# Patient Record
Sex: MALE | Race: WHITE | NOT HISPANIC OR LATINO | Employment: FULL TIME | URBAN - METROPOLITAN AREA
[De-identification: names, ages, dates, MRNs, and addresses within clinical notes are randomized per-mention and may not be internally consistent; named-entity substitution may affect disease eponyms.]

---
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
## 2021-01-07 ENCOUNTER — HOSPITAL ENCOUNTER (OUTPATIENT)
Dept: PHYSICAL THERAPY | Facility: HOSPITAL | Age: 21
Setting detail: THERAPIES SERIES
End: 2021-01-07
Attending: FAMILY MEDICINE
Payer: COMMERCIAL

## 2021-01-07 PROCEDURE — 97140 MANUAL THERAPY 1/> REGIONS: CPT | Mod: GP

## 2021-01-12 ENCOUNTER — HOSPITAL ENCOUNTER (OUTPATIENT)
Dept: PHYSICAL THERAPY | Facility: HOSPITAL | Age: 21
Setting detail: THERAPIES SERIES
End: 2021-01-12
Attending: FAMILY MEDICINE
Payer: COMMERCIAL

## 2021-01-12 PROCEDURE — 97140 MANUAL THERAPY 1/> REGIONS: CPT | Mod: GP

## 2021-02-24 NOTE — PROGRESS NOTES
Outpatient Physical Therapy Discharge Note     Patient: Gallito Doshi  : 2000    Beginning/End Dates of Reporting Period:  2020 to 2021    Referring Provider: Nelly Zapata MD    Therapy Diagnosis: Chronic LBP mediated by somatic dysfunction at pelvic, sacral, lumbar, and thoracic segments, with functional leg length difference.     Client Self Report: Patient seen [id number] for C/O LBP. Doing better. No back pain unless he leans one way or the other. When he straightens up then the pain goes away. He is taking the Ibuprofen only once in awhile PRN. Not every day.     Objective Measurements:  Objective Measure: Somatic dysfunction  Details: Dural sheath adhered in lumbar, thoracic, and cervical regions, in caudal and cephalad glides, although improved. Leg lengths equal, pelvis level, and SI jts equally mobile. Sacral, lumbar, and thoracic segments WFL of alignment.        Goals:  Goal Identifier 1 Functional   Goal Description Improved Oswestry Disability Index score to 5% or less.   Target Date 02/10/21   Date Met      Progress:     Goal Identifier 2 Outcome   Goal Description Resolution of somatic dysfunction   Target Date 02/10/21   Date Met  21   Progress:       Progress Toward Goals:   Progress limited: patient failed to arrive or cancelled follow-up appointments    Plan:  Discharge from therapy.    Discharge:    Reason for Discharge: Patient has not made expected progress due to interrupted treatment attendance.  Patient has failed to schedule further appointments.    Equipment Issued: n/a    Discharge Plan: current status of patient is unknown    Mariam García DPT

## 2021-04-30 ENCOUNTER — RESULTS ONLY (OUTPATIENT)
Dept: LAB | Age: 21
End: 2021-04-30

## 2021-04-30 LAB
LABORATORY COMMENT REPORT: NORMAL
SARS-COV-2 RNA RESP QL NAA+PROBE: NEGATIVE
SPECIMEN SOURCE: NORMAL

## 2021-06-09 ENCOUNTER — APPOINTMENT (OUTPATIENT)
Dept: OCCUPATIONAL MEDICINE | Facility: OTHER | Age: 21
End: 2021-06-09

## 2021-06-28 ENCOUNTER — APPOINTMENT (OUTPATIENT)
Dept: OCCUPATIONAL MEDICINE | Facility: OTHER | Age: 21
End: 2021-06-28

## 2021-07-28 ENCOUNTER — TRANSFERRED RECORDS (OUTPATIENT)
Dept: HEALTH INFORMATION MANAGEMENT | Facility: CLINIC | Age: 21
End: 2021-07-28

## 2021-08-26 NOTE — PROGRESS NOTES
Assessment & Plan     FHx: hemochromatosis  - Cancer Risk Mgmt/Cancer Genetic Counseling Referral; Future    Chronic bilateral low back pain without sciatica  Stable  - c/w IBU / tizanidine   6}     See Patient Instructions    Return if symptoms worsen or fail to improve.    Nelly Zapata MD  [facility] - ROBINA Conti is a 21 year old who presents for the following health issues     HPI     Concern - Hemachromatosis  Onset: Birth  Description: Excess iron in the body  Intensity: severe  Progression of Symptoms:   Accompanying Signs & Symptoms: Maternal Great Uncle has hemachromatosis, maternal grandma was a carrier, and mother carries both genes. 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   HENT: Negative for congestion.    Respiratory: Negative for shortness of breath and wheezing.    Cardiovascular: Negative for chest pain and palpitations.   Gastrointestinal: Negative for abdominal pain.   Musculoskeletal: Positive for back pain.   Psychiatric/Behavioral: Positive for mood changes (improved).          Objective    /80 (BP Location: Left arm, Patient Position: Chair, Cuff Size: Adult Large)   Pulse 76   Temp 98.2  F (36.8  C) (Tympanic)   Resp 18   Wt 112.7 kg (248 lb 6.4 oz)   SpO2 96%   BMI 33.69 kg/m    Body mass index is 33.69 kg/m .  Physical Exam  Constitutional:       General: He is not in acute distress.     Appearance: He is not ill-appearing.   Cardiovascular:      Rate and Rhythm: Normal rate and regular  rhythm.      Pulses: Normal pulses.      Heart sounds: No murmur heard.     Pulmonary:      Effort: Pulmonary effort is normal. No respiratory distress.      Breath sounds: No wheezing or rales.   Neurological:      Mental Status: He is alert.   Psychiatric:         Mood and Affect: Mood normal.         Reviewed glucoses <100

## 2021-08-31 ENCOUNTER — OFFICE VISIT (OUTPATIENT)
Dept: FAMILY MEDICINE | Facility: OTHER | Age: 21
End: 2021-08-31
Attending: FAMILY MEDICINE
Payer: COMMERCIAL

## 2021-08-31 VITALS
WEIGHT: 248.4 LBS | OXYGEN SATURATION: 96 % | RESPIRATION RATE: 18 BRPM | DIASTOLIC BLOOD PRESSURE: 80 MMHG | BODY MASS INDEX: 33.69 KG/M2 | HEART RATE: 76 BPM | TEMPERATURE: 98.2 F | SYSTOLIC BLOOD PRESSURE: 117 MMHG

## 2021-08-31 DIAGNOSIS — Z83.49 FHX: HEMOCHROMATOSIS: Primary | ICD-10-CM

## 2021-08-31 DIAGNOSIS — M54.50 CHRONIC BILATERAL LOW BACK PAIN WITHOUT SCIATICA: ICD-10-CM

## 2021-08-31 DIAGNOSIS — G89.29 CHRONIC BILATERAL LOW BACK PAIN WITHOUT SCIATICA: ICD-10-CM

## 2021-08-31 PROCEDURE — 99213 OFFICE O/P EST LOW 20 MIN: CPT | Performed by: FAMILY MEDICINE

## 2021-08-31 ASSESSMENT — ENCOUNTER SYMPTOMS
PALPITATIONS: 0
BACK PAIN: 1
WHEEZING: 0
SHORTNESS OF BREATH: 0
FEVER: 0
ABDOMINAL PAIN: 0
CHILLS: 0

## 2021-08-31 ASSESSMENT — PAIN SCALES - GENERAL: PAINLEVEL: NO PAIN (0)

## 2021-08-31 NOTE — NURSING NOTE
Chief Complaint   Patient presents with     Immune Deficiency       Initial /80 (BP Location: Left arm, Patient Position: Chair, Cuff Size: Adult Large)   Pulse 76   Temp 98.2  F (36.8  C) (Tympanic)   Resp 18   Wt 112.7 kg (248 lb 6.4 oz)   SpO2 96%   BMI 33.69 kg/m   Estimated body mass index is 33.69 kg/m  as calculated from the following:    Height as of 11/14/19: 1.829 m (6').    Weight as of this encounter: 112.7 kg (248 lb 6.4 oz).  Medication Reconciliation: complete  Isabel Rodriguez LPN

## 2021-09-16 ENCOUNTER — TRANSCRIBE ORDERS (OUTPATIENT)
Dept: OTHER | Age: 21
End: 2021-09-16

## 2021-12-14 ENCOUNTER — NURSE TRIAGE (OUTPATIENT)
Dept: FAMILY MEDICINE | Facility: OTHER | Age: 21
End: 2021-12-14

## 2021-12-14 DIAGNOSIS — J02.0 STREPTOCOCCAL SORE THROAT: Primary | ICD-10-CM

## 2021-12-14 DIAGNOSIS — Z20.818 STREP THROAT EXPOSURE: ICD-10-CM

## 2021-12-14 DIAGNOSIS — Z20.822 SUSPECTED 2019 NOVEL CORONAVIRUS INFECTION: ICD-10-CM

## 2021-12-14 NOTE — TELEPHONE ENCOUNTER
"Pt called stated he called employee health today and they scheduled him for a COVID test. Pt is requesting to be swabbed for covid/influenza/rsv and strep test. Pt reports exposed to all yesterday.    PCP to advise on plan. Orders pended    Call back number: [phone]    COVID 19 Nurse Triage Plan/Patient Instructions    Please be aware that novel coronavirus (COVID-19) may be circulating in the community. If you develop symptoms such as fever, cough, or SOB or if you have concerns about the presence of another infection including coronavirus (COVID-19), please contact your health care provider or visit [email or web address].     Disposition/Instructions    Home care recommended. Follow home care protocol based instructions.  Additional COVID19 information to add for patients.   How can I protect others?  If you have symptoms (fever, cough, body aches or trouble breathing): Stay home and away from others (self-isolate) until:    At least 10 days have passed since your symptoms started, And     You ve had no fever--and no medicine that reduces fever--for 1 full day (24 hours), And      Your other symptoms have resolved (gotten better).     If you don t have symptoms, but a test showed that you have COVID-19 (you tested positive):    Stay home and away from others (self-isolate). Follow the tips under \"How do I self-isolate?\" below for 10 days (20 days if you have a weak immune system).    You don't need to be retested for COVID-19 before going back to school or work. As long as you're fever-free and feeling better, you can go back to school, work and other activities after waiting the 10 or 20 days.     How do I self-isolate?    Stay in your own room, even for meals. Use your own bathroom if you can.     Stay away from others in your home. No hugging, kissing or shaking hands. No visitors.    Don t go to work, school or anywhere else.     Clean  high touch  surfaces often (doorknobs, counters, handles, " etc.). Use a household cleaning spray or wipes. You ll find a full list on the EPA website:  www.epa.gov/pesticide-registration/list-n-disinfectants-use-against-sars-cov-2.    Cover your mouth and nose with a mask, tissue or washcloth to avoid spreading germs.    Wash your hands and face often. Use soap and water.    Caregivers in these groups are at risk for severe illness due to COVID-19:  o People 65 years and older  o People who live in a nursing home or long-term care facility  o People with chronic disease (lung, heart, cancer, diabetes, kidney, liver, immunologic)  o People who have a weakened immune system, including those who:  - Are in cancer treatment  - Take medicine that weakens the immune system, such as corticosteroids  - Had a bone marrow or organ transplant  - Have an immune deficiency  - Have poorly controlled HIV or AIDS  - Are obese (body mass index of 40 or higher)  - Smoke regularly    Caregivers should wear gloves while washing dishes, handling laundry and cleaning bedrooms and bathrooms.    Use caution when washing and drying laundry: Don t shake dirty laundry, and use the warmest water setting that you can.    For more tips, go to www.cdc.gov/coronavirus/2019-ncov/downloads/10Things.pdf.    How can I take care of myself?  1. Get lots of rest. Drink extra fluids (unless a doctor has told you not to).     2. Take Tylenol (acetaminophen) for fever or pain. If you have liver or kidney problems, ask your family doctor if it s okay to take Tylenol.     Adults can take either:     650 mg (two 325 mg pills) every 4 to 6 hours, or     1,000 mg (two 500 mg pills) every 8 hours as needed.     Note: Don t take more than 3,000 mg in one day.   Acetaminophen is found in many medicines (both prescribed and over-the-counter medicines). Read all labels to be sure you don t take too much.     For children, check the Tylenol bottle for the right dose. The dose is based on the child s age or weight.    3. If  you have other health problems (like cancer, heart failure, an organ transplant or severe kidney disease): Call your specialty clinic if you don t feel better in the next 2 days.    4. Know when to call 911: Emergency warning signs include:    Trouble breathing or shortness of breath    Pain or pressure in the chest that doesn t go away    Feeling confused like you haven t felt before, or not being able to wake up    Bluish-colored lips or face    What are the symptoms of COVID-19?     The most common symptoms are cough, fever and trouble breathing.     Less common symptoms include body aches, chills, diarrhea (loose, watery poops), fatigue (feeling very tired), headache, runny nose, sore throat and loss of smell.    COVID-19 can cause severe coughing (bronchitis) and lung infection (pneumonia).    How does it spread?     The virus may spread when a person coughs or sneezes into the air. The virus can travel about 6 feet this way, and it can live on surfaces.      Common  (household disinfectants) will kill the virus.    Who is at risk?  Anyone can catch COVID-19 if they re around someone who has the virus.    How can others protect themselves?     Stay away from people who have COVID-19 (or symptoms of COVID-19).    Wash hands often with soap and water. Or, use hand  with at least 60% alcohol.    Avoid touching the eyes, nose or mouth.     Wear a face mask when you go out in public, when sick or when caring for a sick person.    Where can I get more information?     [facility]: About COVID-19: [email or web address]    CDC: What to Do If You re Sick: www.cdc.gov/coronavirus/2019-ncov/about/steps-when-sick.html    CDC: Ending Home Isolation: www.cdc.gov/coronavirus/2019-ncov/hcp/disposition-in-home-patients.html     CDC: Caring for Someone: www.cdc.gov/coronavirus/2019-ncov/if-you-are-sick/care-for-someone.html     [facility]: Interim Guidance for Hospital Discharge to Home:  [email or web address]/hcp/hospdischarge.pdf    [facility] clinical trials (COVID-19 research studies): [email or web address]-clinical-[email or web address]     Below are the COVID-19 hotlines at the [place] Department of Health ([facility]). Interpreters are available.   o For health questions: Call 076-050-8946 or 1-889.514.9411 (7 a.m. to 7 p.m.)  o For questions about schools and childcare: Call 808-083-4380 or 1-620.744.1439 (7 a.m. to 7 p.m.)          Thank you for taking steps to prevent the spread of this virus.  o Limit your contact with others.  o Wear a simple mask to cover your cough.  o Wash your hands well and often.    Resources     [employer]: About COVID-19: [email or web address]    CDC: What to Do If You're Sick: www.cdc.gov/coronavirus/2019-ncov/about/steps-when-sick.html    CDC: Ending Home Isolation: www.cdc.gov/coronavirus/2019-ncov/hcp/disposition-in-home-patients.html     CDC: Caring for Someone: www.cdc.gov/coronavirus/2019-ncov/if-you-are-sick/care-for-someone.html     [facility]: Interim Guidance for Hospital Discharge to Home: [email or web address].[email or web address]/[email or web address]/hcp/hospdischarge.pdf    [facility] clinical trials (COVID-19 research studies): [email or web address]-clinical-trials     Below are the COVID-19 hotlines at the [place] Department [facility] ([facility]). Interpreters are available.   o For health questions: Call 566-498-0557 or 1-897.145.4361 (7 a.m. to 7 p.m.)  o For questions about schools and childcare: Call 965-140-5753 or 1-132.356.2961 (7 a.m. to 7 p.m.)                       Reason for Disposition    COVID-19 Home Isolation, questions about    COVID-19 Testing, questions about    Additional Information    Negative: SEVERE difficulty breathing (e.g., struggling for each breath, speaks in single words)    Negative: Difficult to awaken or acting confused (e.g., disoriented, slurred speech)    Negative: Bluish (or gray) lips  or face now    Negative: Shock suspected (e.g., cold/pale/clammy skin, too weak to stand, low BP, rapid pulse)    Negative: Sounds like a life-threatening emergency to the triager    Negative: [1] COVID-19 exposure AND [2] no symptoms    Negative: COVID-19 vaccine reaction suspected (e.g., fever, headache, muscle aches) occurring 1 to 3 days after getting vaccine    Negative: COVID-19 vaccine, questions about    Negative: [1] Lives with someone known to have influenza (flu test positive) AND [2] flu-like symptoms (e.g., cough, runny nose, sore throat, SOB; with or without fever)    Negative: [1] Adult with possible COVID-19 symptoms AND [2] triager concerned about severity of symptoms or other causes    Negative: COVID-19 and breastfeeding, questions about    Negative: SEVERE or constant chest pain or pressure (Exception: mild central chest pain, present only when coughing)    Negative: MODERATE difficulty breathing (e.g., speaks in phrases, SOB even at rest, pulse 100-120)    Negative: [1] Headache AND [2] stiff neck (can't touch chin to chest)    Negative: MILD difficulty breathing (e.g., minimal/no SOB at rest, SOB with walking, pulse <100)    Negative: Chest pain or pressure    Negative: Patient sounds very sick or weak to the triager    Negative: Fever > 103 F (39.4 C)    Negative: [1] Fever > 101 F (38.3 C) AND [2] age > 60 years    Negative: [1] Fever > 100.0 F (37.8 C) AND [2] bedridden (e.g., nursing home patient, CVA, chronic illness, recovering from surgery)    Negative: HIGH RISK for severe COVID complications (e.g., age > 64 years, obesity with BMI > 25, pregnant, chronic lung disease or other chronic medical condition)  (Exception: Already seen by PCP and no new or worsening symptoms.)    Negative: [1] HIGH RISK patient AND [2] influenza is widespread in the community AND [3] ONE OR MORE respiratory symptoms: cough, sore throat, runny or stuffy nose    Negative: [1] HIGH RISK patient AND [2] influenza  "exposure within the last 7 days AND [3] ONE OR MORE respiratory symptoms: cough, sore throat, runny or stuffy nose    Negative: [1] COVID-19 infection suspected by caller or triager AND [2] mild symptoms (cough, fever, or others) AND [3] negative COVID-19 rapid test    Negative: Fever present > 3 days (72 hours)    Negative: [1] Fever returns after gone for over 24 hours AND [2] symptoms worse or not improved    Negative: [1] Continuous (nonstop) coughing interferes with work or school AND [2] no improvement using cough treatment per protocol    Negative: Cough present > 3 weeks    Answer Assessment - Initial Assessment Questions  1. COVID-19 DIAGNOSIS: \"Who made your Coronavirus (COVID-19) diagnosis?\" \"Was it confirmed by a positive lab test?\" If not diagnosed by a HCP, ask \"Are there lots of cases (community spread) where you live?\" (See public health department website, if unsure)      no  2. COVID-19 EXPOSURE: \"Was there any known exposure to COVID before the symptoms began?\" CDC Definition of close contact: within 6 feet (2 meters) for a total of 15 minutes or more over a 24-hour period.       Yes reports at work but masked and gown and gloved  3. ONSET: \"When did the COVID-19 symptoms start?\"       12/14/21  4. WORST SYMPTOM: \"What is your worst symptom?\" (e.g., cough, fever, shortness of breath, muscle aches)      Dry Moderate cough taking dayquil   5. COUGH: \"Do you have a cough?\" If Yes, ask: \"How bad is the cough?\"        See note  6. FEVER: \"Do you have a fever?\" If Yes, ask: \"What is your temperature, how was it measured, and when did it start?\"      T 100.2  7. RESPIRATORY STATUS: \"Describe your breathing?\" (e.g., shortness of breath, wheezing, unable to speak)       denies  8. BETTER-SAME-WORSE: \"Are you getting better, staying the same or getting worse compared to yesterday?\"  If getting worse, ask, \"In what way?\"      same  9. HIGH RISK DISEASE: \"Do you have any chronic medical problems?\" (e.g., " "asthma, heart or lung disease, weak immune system, obesity, etc.)      no  10. PREGNANCY: \"Is there any chance you are pregnant?\" \"When was your last menstrual period?\"        n/a  11. OTHER SYMPTOMS: \"Do you have any other symptoms?\"  (e.g., chills, fatigue, headache, loss of smell or taste, muscle pain, sore throat; new loss of smell or taste especially support the diagnosis of COVID-19)        Sore throat, moderate cough.    Protocols used: CORONAVIRUS (COVID-19) DIAGNOSED OR [id number] 8.25.2021      "

## 2021-12-14 NOTE — TELEPHONE ENCOUNTER
Nelly Zapata MD  You 35 minutes ago (10:54 AM)         Orders signed    Message text      Pt updated. Pt stated employee health requested he be tested tomorrow.   Appointments in Next Year    Dec 15, 2021  9:00 AM  LAB with Viky Mills RN   [facility] ([facility] ) [phone]   Dec 15, 2021  9:00 AM  (Arrive by 8:45 AM)  SHORT with HC COLLECTION  [facility] ([facility] ) [phone]

## 2021-12-15 ENCOUNTER — LAB (OUTPATIENT)
Dept: OCCUPATIONAL MEDICINE | Facility: OTHER | Age: 21
End: 2021-12-15

## 2021-12-15 ENCOUNTER — OFFICE VISIT (OUTPATIENT)
Dept: FAMILY MEDICINE | Facility: OTHER | Age: 21
End: 2021-12-15
Attending: FAMILY MEDICINE
Payer: COMMERCIAL

## 2021-12-15 DIAGNOSIS — Z20.822 SUSPECTED 2019 NOVEL CORONAVIRUS INFECTION: ICD-10-CM

## 2021-12-15 DIAGNOSIS — J02.0 STREPTOCOCCAL SORE THROAT: Primary | ICD-10-CM

## 2021-12-15 LAB
FLUAV RNA SPEC QL NAA+PROBE: NEGATIVE
FLUBV RNA RESP QL NAA+PROBE: NEGATIVE
GROUP A STREP BY PCR: NOT DETECTED
RSV RNA SPEC NAA+PROBE: NEGATIVE
SARS-COV-2 RNA RESP QL NAA+PROBE: NEGATIVE

## 2021-12-15 PROCEDURE — 87651 STREP A DNA AMP PROBE: CPT

## 2021-12-15 PROCEDURE — 87637 SARSCOV2&INF A&B&RSV AMP PRB: CPT

## 2022-02-04 ENCOUNTER — LAB (OUTPATIENT)
Dept: OCCUPATIONAL MEDICINE | Facility: OTHER | Age: 22
End: 2022-02-04

## 2022-02-04 PROCEDURE — 99000 SPECIMEN HANDLING OFFICE-LAB: CPT

## 2022-07-24 ENCOUNTER — ANESTHESIA (OUTPATIENT)
Dept: SURGERY | Facility: HOSPITAL | Age: 22
End: 2022-07-24
Payer: COMMERCIAL

## 2022-07-24 ENCOUNTER — ANESTHESIA EVENT (OUTPATIENT)
Dept: SURGERY | Facility: HOSPITAL | Age: 22
End: 2022-07-24
Payer: COMMERCIAL

## 2022-07-24 ENCOUNTER — HOSPITAL ENCOUNTER (EMERGENCY)
Facility: HOSPITAL | Age: 22
Discharge: HOME | End: 2022-07-24
Attending: EMERGENCY MEDICINE | Admitting: EMERGENCY MEDICINE
Payer: COMMERCIAL

## 2022-07-24 ENCOUNTER — APPOINTMENT (OUTPATIENT)
Dept: CT IMAGING | Facility: HOSPITAL | Age: 22
End: 2022-07-24
Attending: EMERGENCY MEDICINE
Payer: COMMERCIAL

## 2022-07-24 VITALS
HEART RATE: 85 BPM | TEMPERATURE: 97.2 F | HEIGHT: 73 IN | OXYGEN SATURATION: 96 % | WEIGHT: 260 LBS | RESPIRATION RATE: 16 BRPM | SYSTOLIC BLOOD PRESSURE: 130 MMHG | BODY MASS INDEX: 34.46 KG/M2 | DIASTOLIC BLOOD PRESSURE: 76 MMHG

## 2022-07-24 DIAGNOSIS — K35.30 ACUTE APPENDICITIS WITH LOCALIZED PERITONITIS, WITHOUT PERFORATION, ABSCESS, OR GANGRENE: Primary | ICD-10-CM

## 2022-07-24 LAB
ALBUMIN SERPL-MCNC: 4.3 G/DL (ref 3.4–5)
ALBUMIN UR-MCNC: NEGATIVE MG/DL
ALP SERPL-CCNC: 158 U/L (ref 40–150)
ALT SERPL W P-5'-P-CCNC: 178 U/L (ref 0–70)
ANION GAP SERPL CALCULATED.3IONS-SCNC: 6 MMOL/L (ref 3–14)
APPEARANCE UR: CLEAR
AST SERPL W P-5'-P-CCNC: 57 U/L (ref 0–45)
BASOPHILS # BLD AUTO: 0 10E3/UL (ref 0–0.2)
BASOPHILS NFR BLD AUTO: 0 %
BILIRUB SERPL-MCNC: 0.4 MG/DL (ref 0.2–1.3)
BILIRUB UR QL STRIP: NEGATIVE
BUN SERPL-MCNC: 15 MG/DL (ref 7–30)
CALCIUM SERPL-MCNC: 9.3 MG/DL (ref 8.5–10.1)
CHLORIDE BLD-SCNC: 107 MMOL/L (ref 94–109)
CO2 SERPL-SCNC: 25 MMOL/L (ref 20–32)
COLOR UR AUTO: ABNORMAL
CREAT SERPL-MCNC: 1.05 MG/DL (ref 0.66–1.25)
EOSINOPHIL # BLD AUTO: 0.3 10E3/UL (ref 0–0.7)
EOSINOPHIL NFR BLD AUTO: 2 %
ERYTHROCYTE [DISTWIDTH] IN BLOOD BY AUTOMATED COUNT: 11.8 % (ref 10–15)
ERYTHROCYTE [SEDIMENTATION RATE] IN BLOOD BY WESTERGREN METHOD: 5 MM/HR (ref 0–15)
FLUAV RNA SPEC QL NAA+PROBE: NEGATIVE
FLUBV RNA RESP QL NAA+PROBE: NEGATIVE
GFR SERPL CREATININE-BSD FRML MDRD: >90 ML/MIN/1.73M2
GLUCOSE BLD-MCNC: 100 MG/DL (ref 70–99)
GLUCOSE UR STRIP-MCNC: NEGATIVE MG/DL
HCT VFR BLD AUTO: 41 % (ref 40–53)
HGB BLD-MCNC: 14.3 G/DL (ref 13.3–17.7)
HGB UR QL STRIP: NEGATIVE
HOLD SPECIMEN: NORMAL
HOLD SPECIMEN: NORMAL
IMM GRANULOCYTES # BLD: 0 10E3/UL
IMM GRANULOCYTES NFR BLD: 0 %
INR PPP: 1.08 (ref 0.85–1.15)
KETONES UR STRIP-MCNC: NEGATIVE MG/DL
LEUKOCYTE ESTERASE UR QL STRIP: NEGATIVE
LIPASE SERPL-CCNC: 104 U/L (ref 73–393)
LYMPHOCYTES # BLD AUTO: 1.9 10E3/UL (ref 0.8–5.3)
LYMPHOCYTES NFR BLD AUTO: 14 %
MCH RBC QN AUTO: 31.9 PG (ref 26.5–33)
MCHC RBC AUTO-ENTMCNC: 34.9 G/DL (ref 31.5–36.5)
MCV RBC AUTO: 92 FL (ref 78–100)
MONOCYTES # BLD AUTO: 1 10E3/UL (ref 0–1.3)
MONOCYTES NFR BLD AUTO: 8 %
MUCOUS THREADS #/AREA URNS LPF: PRESENT /LPF
NEUTROPHILS # BLD AUTO: 10.3 10E3/UL (ref 1.6–8.3)
NEUTROPHILS NFR BLD AUTO: 76 %
NITRATE UR QL: NEGATIVE
NRBC # BLD AUTO: 0 10E3/UL
NRBC BLD AUTO-RTO: 0 /100
PH UR STRIP: 5 [PH] (ref 4.7–8)
PLATELET # BLD AUTO: 169 10E3/UL (ref 150–450)
POTASSIUM BLD-SCNC: 4.3 MMOL/L (ref 3.4–5.3)
PROT SERPL-MCNC: 7.6 G/DL (ref 6.8–8.8)
RBC # BLD AUTO: 4.48 10E6/UL (ref 4.4–5.9)
RBC URINE: <1 /HPF
RSV RNA SPEC NAA+PROBE: NEGATIVE
SARS-COV-2 RNA RESP QL NAA+PROBE: NEGATIVE
SODIUM SERPL-SCNC: 138 MMOL/L (ref 133–144)
SP GR UR STRIP: 1.02 (ref 1–1.03)
SQUAMOUS EPITHELIAL: 0 /HPF
UROBILINOGEN UR STRIP-MCNC: NORMAL MG/DL
WBC # BLD AUTO: 13.5 10E3/UL (ref 4–11)
WBC URINE: <1 /HPF

## 2022-07-24 PROCEDURE — 250N000011 HC RX IP 250 OP 636: Performed by: NURSE ANESTHETIST, CERTIFIED REGISTERED

## 2022-07-24 PROCEDURE — 87637 SARSCOV2&INF A&B&RSV AMP PRB: CPT | Performed by: EMERGENCY MEDICINE

## 2022-07-24 PROCEDURE — 250N000025 HC SEVOFLURANE, PER MIN: Performed by: SURGERY

## 2022-07-24 PROCEDURE — 258N000003 HC RX IP 258 OP 636: Performed by: NURSE ANESTHETIST, CERTIFIED REGISTERED

## 2022-07-24 PROCEDURE — 81001 URINALYSIS AUTO W/SCOPE: CPT | Performed by: EMERGENCY MEDICINE

## 2022-07-24 PROCEDURE — 250N000009 HC RX 250: Performed by: NURSE ANESTHETIST, CERTIFIED REGISTERED

## 2022-07-24 PROCEDURE — 85025 COMPLETE CBC W/AUTO DIFF WBC: CPT | Performed by: EMERGENCY MEDICINE

## 2022-07-24 PROCEDURE — 250N000011 HC RX IP 250 OP 636: Performed by: EMERGENCY MEDICINE

## 2022-07-24 PROCEDURE — 74177 CT ABD & PELVIS W/CONTRAST: CPT

## 2022-07-24 PROCEDURE — 44970 LAPAROSCOPY APPENDECTOMY: CPT | Performed by: NURSE ANESTHETIST, CERTIFIED REGISTERED

## 2022-07-24 PROCEDURE — 80053 COMPREHEN METABOLIC PANEL: CPT | Performed by: EMERGENCY MEDICINE

## 2022-07-24 PROCEDURE — 272N000001 HC OR GENERAL SUPPLY STERILE: Performed by: SURGERY

## 2022-07-24 PROCEDURE — 250N000013 HC RX MED GY IP 250 OP 250 PS 637: Performed by: NURSE ANESTHETIST, CERTIFIED REGISTERED

## 2022-07-24 PROCEDURE — 250N000011 HC RX IP 250 OP 636: Performed by: STUDENT IN AN ORGANIZED HEALTH CARE EDUCATION/TRAINING PROGRAM

## 2022-07-24 PROCEDURE — 258N000003 HC RX IP 258 OP 636: Performed by: SURGERY

## 2022-07-24 PROCEDURE — 96374 THER/PROPH/DIAG INJ IV PUSH: CPT | Mod: XU

## 2022-07-24 PROCEDURE — 258N000003 HC RX IP 258 OP 636: Performed by: EMERGENCY MEDICINE

## 2022-07-24 PROCEDURE — 85652 RBC SED RATE AUTOMATED: CPT | Performed by: EMERGENCY MEDICINE

## 2022-07-24 PROCEDURE — 96376 TX/PRO/DX INJ SAME DRUG ADON: CPT

## 2022-07-24 PROCEDURE — 250N000009 HC RX 250: Performed by: SURGERY

## 2022-07-24 PROCEDURE — 44970 LAPAROSCOPY APPENDECTOMY: CPT | Performed by: SURGERY

## 2022-07-24 PROCEDURE — 710N000010 HC RECOVERY PHASE 1, LEVEL 2, PER MIN: Performed by: SURGERY

## 2022-07-24 PROCEDURE — 250N000011 HC RX IP 250 OP 636

## 2022-07-24 PROCEDURE — 85610 PROTHROMBIN TIME: CPT | Performed by: EMERGENCY MEDICINE

## 2022-07-24 PROCEDURE — 99284 EMERGENCY DEPT VISIT MOD MDM: CPT | Performed by: STUDENT IN AN ORGANIZED HEALTH CARE EDUCATION/TRAINING PROGRAM

## 2022-07-24 PROCEDURE — 99285 EMERGENCY DEPT VISIT HI MDM: CPT | Mod: 25

## 2022-07-24 PROCEDURE — 88304 TISSUE EXAM BY PATHOLOGIST: CPT | Mod: 26 | Performed by: PATHOLOGY

## 2022-07-24 PROCEDURE — C9803 HOPD COVID-19 SPEC COLLECT: HCPCS

## 2022-07-24 PROCEDURE — 83690 ASSAY OF LIPASE: CPT | Performed by: EMERGENCY MEDICINE

## 2022-07-24 PROCEDURE — 250N000011 HC RX IP 250 OP 636: Performed by: SURGERY

## 2022-07-24 PROCEDURE — 360N000076 HC SURGERY LEVEL 3, PER MIN: Performed by: SURGERY

## 2022-07-24 PROCEDURE — 88304 TISSUE EXAM BY PATHOLOGIST: CPT | Mod: TC | Performed by: SURGERY

## 2022-07-24 PROCEDURE — 36415 COLL VENOUS BLD VENIPUNCTURE: CPT | Performed by: EMERGENCY MEDICINE

## 2022-07-24 PROCEDURE — 710N000012 HC RECOVERY PHASE 2, PER MINUTE: Performed by: SURGERY

## 2022-07-24 PROCEDURE — 96375 TX/PRO/DX INJ NEW DRUG ADDON: CPT

## 2022-07-24 PROCEDURE — 370N000017 HC ANESTHESIA TECHNICAL FEE, PER MIN: Performed by: SURGERY

## 2022-07-24 RX ORDER — HYDROMORPHONE HYDROCHLORIDE 1 MG/ML
0.2 INJECTION, SOLUTION INTRAMUSCULAR; INTRAVENOUS; SUBCUTANEOUS EVERY 5 MIN PRN
Status: DISCONTINUED | OUTPATIENT
Start: 2022-07-24 | End: 2022-07-24 | Stop reason: HOSPADM

## 2022-07-24 RX ORDER — KETOROLAC TROMETHAMINE 30 MG/ML
INJECTION, SOLUTION INTRAMUSCULAR; INTRAVENOUS PRN
Status: DISCONTINUED | OUTPATIENT
Start: 2022-07-24 | End: 2022-07-24

## 2022-07-24 RX ORDER — SODIUM CHLORIDE, SODIUM LACTATE, POTASSIUM CHLORIDE, CALCIUM CHLORIDE 600; 310; 30; 20 MG/100ML; MG/100ML; MG/100ML; MG/100ML
INJECTION, SOLUTION INTRAVENOUS CONTINUOUS
Status: DISCONTINUED | OUTPATIENT
Start: 2022-07-24 | End: 2022-07-24 | Stop reason: HOSPADM

## 2022-07-24 RX ORDER — ONDANSETRON 2 MG/ML
INJECTION INTRAMUSCULAR; INTRAVENOUS PRN
Status: DISCONTINUED | OUTPATIENT
Start: 2022-07-24 | End: 2022-07-24

## 2022-07-24 RX ORDER — FENTANYL CITRATE 50 UG/ML
50 INJECTION, SOLUTION INTRAMUSCULAR; INTRAVENOUS ONCE
Status: COMPLETED | OUTPATIENT
Start: 2022-07-24 | End: 2022-07-24

## 2022-07-24 RX ORDER — FENTANYL CITRATE 50 UG/ML
INJECTION, SOLUTION INTRAMUSCULAR; INTRAVENOUS PRN
Status: DISCONTINUED | OUTPATIENT
Start: 2022-07-24 | End: 2022-07-24

## 2022-07-24 RX ORDER — CEFAZOLIN SODIUM 2 G/100ML
2 INJECTION, SOLUTION INTRAVENOUS
Status: CANCELLED | OUTPATIENT
Start: 2022-07-24

## 2022-07-24 RX ORDER — ONDANSETRON 2 MG/ML
4 INJECTION INTRAMUSCULAR; INTRAVENOUS EVERY 30 MIN PRN
Status: DISCONTINUED | OUTPATIENT
Start: 2022-07-24 | End: 2022-07-24 | Stop reason: HOSPADM

## 2022-07-24 RX ORDER — IOPAMIDOL 755 MG/ML
128 INJECTION, SOLUTION INTRAVASCULAR ONCE
Status: COMPLETED | OUTPATIENT
Start: 2022-07-24 | End: 2022-07-24

## 2022-07-24 RX ORDER — CEFAZOLIN SODIUM 2 G/100ML
2 INJECTION, SOLUTION INTRAVENOUS SEE ADMIN INSTRUCTIONS
Status: CANCELLED | OUTPATIENT
Start: 2022-07-24

## 2022-07-24 RX ORDER — FENTANYL CITRATE 50 UG/ML
INJECTION, SOLUTION INTRAMUSCULAR; INTRAVENOUS
Status: DISCONTINUED
Start: 2022-07-24 | End: 2022-07-24 | Stop reason: HOSPADM

## 2022-07-24 RX ORDER — METOPROLOL TARTRATE 1 MG/ML
1-2 INJECTION, SOLUTION INTRAVENOUS EVERY 5 MIN PRN
Status: DISCONTINUED | OUTPATIENT
Start: 2022-07-24 | End: 2022-07-24 | Stop reason: HOSPADM

## 2022-07-24 RX ORDER — MEPERIDINE HYDROCHLORIDE 25 MG/ML
12.5 INJECTION INTRAMUSCULAR; INTRAVENOUS; SUBCUTANEOUS
Status: DISCONTINUED | OUTPATIENT
Start: 2022-07-24 | End: 2022-07-24 | Stop reason: HOSPADM

## 2022-07-24 RX ORDER — NALOXONE HYDROCHLORIDE 0.4 MG/ML
0.4 INJECTION, SOLUTION INTRAMUSCULAR; INTRAVENOUS; SUBCUTANEOUS
Status: DISCONTINUED | OUTPATIENT
Start: 2022-07-24 | End: 2022-07-24 | Stop reason: HOSPADM

## 2022-07-24 RX ORDER — SODIUM CHLORIDE 9 MG/ML
INJECTION, SOLUTION INTRAVENOUS CONTINUOUS
Status: DISCONTINUED | OUTPATIENT
Start: 2022-07-24 | End: 2022-07-24 | Stop reason: HOSPADM

## 2022-07-24 RX ORDER — HYDROMORPHONE HYDROCHLORIDE 1 MG/ML
0.5 INJECTION, SOLUTION INTRAMUSCULAR; INTRAVENOUS; SUBCUTANEOUS
Status: DISCONTINUED | OUTPATIENT
Start: 2022-07-24 | End: 2022-07-24 | Stop reason: HOSPADM

## 2022-07-24 RX ORDER — ONDANSETRON 4 MG/1
4 TABLET, ORALLY DISINTEGRATING ORAL EVERY 30 MIN PRN
Status: DISCONTINUED | OUTPATIENT
Start: 2022-07-24 | End: 2022-07-24 | Stop reason: HOSPADM

## 2022-07-24 RX ORDER — FENTANYL CITRATE 50 UG/ML
50 INJECTION, SOLUTION INTRAMUSCULAR; INTRAVENOUS
Status: CANCELLED | OUTPATIENT
Start: 2022-07-24

## 2022-07-24 RX ORDER — SODIUM CHLORIDE, SODIUM LACTATE, POTASSIUM CHLORIDE, CALCIUM CHLORIDE 600; 310; 30; 20 MG/100ML; MG/100ML; MG/100ML; MG/100ML
INJECTION, SOLUTION INTRAVENOUS CONTINUOUS
Status: CANCELLED | OUTPATIENT
Start: 2022-07-24

## 2022-07-24 RX ORDER — BUPIVACAINE HYDROCHLORIDE AND EPINEPHRINE 2.5; 5 MG/ML; UG/ML
INJECTION, SOLUTION EPIDURAL; INFILTRATION; INTRACAUDAL; PERINEURAL PRN
Status: DISCONTINUED | OUTPATIENT
Start: 2022-07-24 | End: 2022-07-24 | Stop reason: HOSPADM

## 2022-07-24 RX ORDER — DEXAMETHASONE SODIUM PHOSPHATE 10 MG/ML
INJECTION, SOLUTION INTRAMUSCULAR; INTRAVENOUS PRN
Status: DISCONTINUED | OUTPATIENT
Start: 2022-07-24 | End: 2022-07-24

## 2022-07-24 RX ORDER — SODIUM CHLORIDE, SODIUM LACTATE, POTASSIUM CHLORIDE, CALCIUM CHLORIDE 600; 310; 30; 20 MG/100ML; MG/100ML; MG/100ML; MG/100ML
INJECTION, SOLUTION INTRAVENOUS CONTINUOUS PRN
Status: DISCONTINUED | OUTPATIENT
Start: 2022-07-24 | End: 2022-07-24

## 2022-07-24 RX ORDER — OXYCODONE HYDROCHLORIDE 5 MG/1
5 TABLET ORAL EVERY 4 HOURS PRN
Status: DISCONTINUED | OUTPATIENT
Start: 2022-07-24 | End: 2022-07-24 | Stop reason: HOSPADM

## 2022-07-24 RX ORDER — HYDRALAZINE HYDROCHLORIDE 20 MG/ML
2.5-5 INJECTION INTRAMUSCULAR; INTRAVENOUS EVERY 10 MIN PRN
Status: DISCONTINUED | OUTPATIENT
Start: 2022-07-24 | End: 2022-07-24 | Stop reason: HOSPADM

## 2022-07-24 RX ORDER — CEFEPIME HYDROCHLORIDE 1 G/50ML
INJECTION, SOLUTION INTRAVENOUS
Status: COMPLETED
Start: 2022-07-24 | End: 2022-07-24

## 2022-07-24 RX ORDER — NALOXONE HYDROCHLORIDE 0.4 MG/ML
0.2 INJECTION, SOLUTION INTRAMUSCULAR; INTRAVENOUS; SUBCUTANEOUS
Status: DISCONTINUED | OUTPATIENT
Start: 2022-07-24 | End: 2022-07-24 | Stop reason: HOSPADM

## 2022-07-24 RX ORDER — OXYCODONE HYDROCHLORIDE 5 MG/1
5 TABLET ORAL EVERY 6 HOURS PRN
Qty: 10 TABLET | Refills: 0 | Status: SHIPPED | OUTPATIENT
Start: 2022-07-24 | End: 2022-08-09

## 2022-07-24 RX ORDER — BUPIVACAINE HYDROCHLORIDE AND EPINEPHRINE 2.5; 5 MG/ML; UG/ML
INJECTION, SOLUTION EPIDURAL; INFILTRATION; INTRACAUDAL; PERINEURAL
Status: DISCONTINUED
Start: 2022-07-24 | End: 2022-07-24 | Stop reason: HOSPADM

## 2022-07-24 RX ORDER — ACETAMINOPHEN 325 MG/1
650 TABLET ORAL EVERY 4 HOURS PRN
Qty: 50 TABLET | Refills: 0 | Status: SHIPPED | OUTPATIENT
Start: 2022-07-24 | End: 2024-03-11

## 2022-07-24 RX ORDER — LIDOCAINE 40 MG/G
CREAM TOPICAL
Status: CANCELLED | OUTPATIENT
Start: 2022-07-24

## 2022-07-24 RX ORDER — LIDOCAINE HYDROCHLORIDE 20 MG/ML
INJECTION, SOLUTION INFILTRATION; PERINEURAL PRN
Status: DISCONTINUED | OUTPATIENT
Start: 2022-07-24 | End: 2022-07-24

## 2022-07-24 RX ORDER — PROPOFOL 10 MG/ML
INJECTION, EMULSION INTRAVENOUS PRN
Status: DISCONTINUED | OUTPATIENT
Start: 2022-07-24 | End: 2022-07-24

## 2022-07-24 RX ORDER — FENTANYL CITRATE 50 UG/ML
50 INJECTION, SOLUTION INTRAMUSCULAR; INTRAVENOUS EVERY 5 MIN PRN
Status: DISCONTINUED | OUTPATIENT
Start: 2022-07-24 | End: 2022-07-24 | Stop reason: HOSPADM

## 2022-07-24 RX ORDER — CEFOXITIN 1 G/1
INJECTION, POWDER, FOR SOLUTION INTRAVENOUS PRN
Status: DISCONTINUED | OUTPATIENT
Start: 2022-07-24 | End: 2022-07-24

## 2022-07-24 RX ADMIN — LIDOCAINE HYDROCHLORIDE 40 MG: 20 INJECTION, SOLUTION INFILTRATION; PERINEURAL at 08:26

## 2022-07-24 RX ADMIN — SODIUM CHLORIDE 1000 ML: 9 INJECTION, SOLUTION INTRAVENOUS at 04:57

## 2022-07-24 RX ADMIN — SODIUM CHLORIDE, POTASSIUM CHLORIDE, SODIUM LACTATE AND CALCIUM CHLORIDE: 600; 310; 30; 20 INJECTION, SOLUTION INTRAVENOUS at 07:44

## 2022-07-24 RX ADMIN — CEFOXITIN 1 G: 1 INJECTION, POWDER, FOR SOLUTION INTRAVENOUS at 08:09

## 2022-07-24 RX ADMIN — FENTANYL CITRATE 50 MCG: 50 INJECTION, SOLUTION INTRAMUSCULAR; INTRAVENOUS at 04:56

## 2022-07-24 RX ADMIN — FENTANYL CITRATE 50 MCG: 50 INJECTION, SOLUTION INTRAMUSCULAR; INTRAVENOUS at 09:32

## 2022-07-24 RX ADMIN — ONDANSETRON 4 MG: 2 INJECTION INTRAMUSCULAR; INTRAVENOUS at 08:49

## 2022-07-24 RX ADMIN — HYDROMORPHONE HYDROCHLORIDE 0.5 MG: 1 INJECTION, SOLUTION INTRAMUSCULAR; INTRAVENOUS; SUBCUTANEOUS at 07:44

## 2022-07-24 RX ADMIN — FENTANYL CITRATE 100 MCG: 50 INJECTION, SOLUTION INTRAMUSCULAR; INTRAVENOUS at 08:26

## 2022-07-24 RX ADMIN — DEXAMETHASONE SODIUM PHOSPHATE 10 MG: 10 INJECTION, SOLUTION INTRAMUSCULAR; INTRAVENOUS at 08:33

## 2022-07-24 RX ADMIN — TAZOBACTAM SODIUM AND PIPERACILLIN SODIUM 3.38 G: 375; 3 INJECTION, SOLUTION INTRAVENOUS at 06:37

## 2022-07-24 RX ADMIN — CEFEPIME HYDROCHLORIDE 1 G: 1 INJECTION, SOLUTION INTRAVENOUS at 08:08

## 2022-07-24 RX ADMIN — MEPERIDINE HYDROCHLORIDE 12.5 MG: 25 INJECTION INTRAMUSCULAR; INTRAVENOUS; SUBCUTANEOUS at 09:38

## 2022-07-24 RX ADMIN — ROCURONIUM BROMIDE 50 MG: 10 INJECTION INTRAVENOUS at 08:26

## 2022-07-24 RX ADMIN — KETOROLAC TROMETHAMINE 30 MG: 30 INJECTION, SOLUTION INTRAMUSCULAR at 08:57

## 2022-07-24 RX ADMIN — ONDANSETRON 4 MG: 2 INJECTION INTRAMUSCULAR; INTRAVENOUS at 04:54

## 2022-07-24 RX ADMIN — OXYCODONE HYDROCHLORIDE 5 MG: 5 TABLET ORAL at 10:02

## 2022-07-24 RX ADMIN — FENTANYL CITRATE 50 MCG: 50 INJECTION, SOLUTION INTRAMUSCULAR; INTRAVENOUS at 06:05

## 2022-07-24 RX ADMIN — PROPOFOL 200 MG: 10 INJECTION, EMULSION INTRAVENOUS at 08:26

## 2022-07-24 RX ADMIN — SUGAMMADEX 200 MG: 100 INJECTION, SOLUTION INTRAVENOUS at 09:07

## 2022-07-24 RX ADMIN — IOPAMIDOL 128 ML: 755 INJECTION, SOLUTION INTRAVENOUS at 05:35

## 2022-07-24 ASSESSMENT — ENCOUNTER SYMPTOMS
NAUSEA: 1
NEUROLOGICAL NEGATIVE: 1
MUSCULOSKELETAL NEGATIVE: 1
CONSTITUTIONAL NEGATIVE: 1
DIARRHEA: 1
RESPIRATORY NEGATIVE: 1
EYES NEGATIVE: 1
ABDOMINAL PAIN: 1
CARDIOVASCULAR NEGATIVE: 1
ENDOCRINE NEGATIVE: 1

## 2022-07-24 NOTE — DISCHARGE INSTRUCTIONS
Post-Anesthesia Patient Instructions    IMMEDIATELY FOLLOWING SURGERY:  Do not drive or operate machinery for the first twenty four hours after surgery.  Do not make any important decisions for twenty four hours after surgery or while taking narcotic pain medications or sedatives.  If you develop intractable nausea and vomiting or a severe headache please notify your doctor immediately.    FOLLOW-UP:  Please make an appointment with your surgeon as instructed. You do not need to follow up with anesthesia unless specifically instructed to do so.    WOUND CARE INSTRUCTIONS (if applicable):  Keep a dry clean dressing on the anesthesia/puncture wound site if there is drainage.  Once the wound has quit draining you may leave it open to air.  Generally you should leave the bandage intact for twenty four hours unless there is drainage.  If the epidural site drains for more than 36-48 hours please call the anesthesia department.    QUESTIONS?:  Please feel free to call your physician or the hospital  if you have any questions, and they will be happy to assist you.                         What to expect when you have contrast    During your exam, we will inject  contrast  into your vein or artery. (Contrast is a clear liquid with iodine in it. It shows up on X-rays.)    You may feel warm or hot. You may have a metal taste in your mouth and a slight upset stomach. You may also feel pressure near the kidneys and bladder. These effects will last about 1 to 3 minutes.    Please tell us if you have:   Sneezing    Itching   Hives    Swelling in the face   A hoarse voice   Breathing problems   Other new symptoms    Serious problems are rare.  They may include:   Irregular heartbeat    Seizures   Kidney failure             Tissue damage   Shock     Death    If you have any problems during the exam, we  will treat them right away.    When you get home    Call your hospital if you have any new symptoms in the next 2 days,  like hives or swelling. (Phone numbers are at the bottom of this page.) Or call your family doctor.     If you have wheezing or trouble breathing, call 911.    Self-care  -Drink at least 4 extra glasses of water today.   This reduces the stress on your kidneys.  -Keep taking your regular medicines.    The contrast will pass out of your body in your  Urine(pee). This will happen in the next 24 hours. You  will not feel this. Your urine will not  change color.    If you have kidney problems or take metformin    Drink 4 to 8 large glasses of water for the next  2 days, if you are not on a fluid restriction.    ?If you take metformin (Glucophage or Glucovance) for diabetes, keep taking it.      ?Your kidney function tests are abnormal.  If you take Metformin, do not take it for 48 hours. Please go to your clinic for a blood test within 3 days after your exam before the restarting this medicine.     (Note to provider:please give patient prescription for lab tests.)    ?Special instructions: ***    I have read and understand the above information.    Patient Sign Here:______________________________________Date:________Time:______    Staff Sign Here:________________________________________Date:_______Time:______      Radiology Departments:     ?[facility]: [phone] ?[place]: [phone]     ?[place]: [phone] ?[facility]:[phone]      ?[place]: [phone]  ?[place]: [phone]  ?[place]:[phone]    ?[facility]:[phone]  ?[facility]:[phone]

## 2022-07-24 NOTE — ANESTHESIA PREPROCEDURE EVALUATION
Anesthesia Pre-Procedure Evaluation    Patient: Gallito Doshi   MRN: [id number] : 2000        Procedure : * No procedures listed *          No past medical history on file.   Past Surgical History:   Procedure Laterality Date     SCLERAL BUCKLE Right 10/17/2019    Left eye done 2020      No Known Allergies   Social History     Tobacco Use     Smoking status: Never Smoker     Smokeless tobacco: Never Used   Substance Use Topics     Alcohol use: Never      Wt Readings from Last 1 Encounters:   22 117.9 kg (260 lb)        Anesthesia Evaluation   Pt has had prior anesthetic. Type: General and MAC.        ROS/MED HX  ENT/Pulmonary:     (+) JUAN risk factors, snores loudly, daytime somnolence, Intermittent, asthma (exercise induced; never been on inhalers)     Neurologic:  - neg neurologic ROS     Cardiovascular:       METS/Exercise Tolerance: >4 METS    Hematologic: Comments: Family hx hemochromatosis; pt has not been tested      Musculoskeletal:  - neg musculoskeletal ROS     GI/Hepatic: Comment: RLQ pain  Acute appendicitis       Renal/Genitourinary:  - neg Renal ROS     Endo:  - neg endo ROS     Psychiatric/Substance Use:     (+) psychiatric history other (comment) and depression (homocidal ideation, psychosis, suicidal ideation, ODD, disruptive mood d/o)     Infectious Disease:  - neg infectious disease ROS     Malignancy:  - neg malignancy ROS     Other:            Physical Exam    Airway        Mallampati: II   TM distance: > 3 FB   Neck ROM: full   Mouth opening: < 3 cm    Respiratory Devices and Support         Dental         B=Bridge, C=Chipped, L=Loose, M=Missing    Cardiovascular          Rhythm and rate: regular and normal     Pulmonary           breath sounds clear to auscultation           OUTSIDE LABS:  CBC:   Lab Results   Component Value Date    WBC 13.5 (H) 2022    WBC 4.0 2019    HGB 14.3 2022    HGB 14.7 2019    HCT 41.0 2022    HCT 42.1 2019      07/24/2022     11/14/2019     BMP:   Lab Results   Component Value Date     07/24/2022     11/14/2019    POTASSIUM 4.3 07/24/2022    POTASSIUM 4.3 11/14/2019    CHLORIDE 107 07/24/2022    CHLORIDE 106 11/14/2019    CO2 25 07/24/2022    CO2 27 11/14/2019    BUN 15 07/24/2022    BUN 16 11/14/2019    CR 1.05 07/24/2022    CR 0.91 11/14/2019     (H) 07/24/2022    GLC 97 11/14/2019     COAGS:   Lab Results   Component Value Date    INR 1.08 07/24/2022     POC: No results found for: BGM, HCG, HCGS  HEPATIC:   Lab Results   Component Value Date    ALBUMIN 4.3 07/24/2022    PROTTOTAL 7.6 07/24/2022     (H) 07/24/2022    AST 57 (H) 07/24/2022    ALKPHOS 158 (H) 07/24/2022    BILITOTAL 0.4 07/24/2022     OTHER:   Lab Results   Component Value Date    CIRILO 9.3 07/24/2022    LIPASE 104 07/24/2022    TSH 1.65 02/05/2017    SED 5 07/24/2022       Anesthesia Plan    ASA Status:  2   NPO Status:  NPO Appropriate (11pm last night ham sandwich, midnight 16oz bottle of water)    Anesthesia Type: General.     - Airway: ETT   Induction: Propofol, Intravenous.   Maintenance: Balanced.        Consents    Anesthesia Plan(s) and associated risks, benefits, and realistic alternatives discussed. Questions answered and patient/representative(s) expressed understanding.     - Discussed: Risks, Benefits and Alternatives for BOTH SEDATION and the PROCEDURE were discussed     - Discussed with:  Patient      - Extended Intubation/Ventilatory Support Discussed: No.      - Patient is DNR/DNI Status: No    Use of blood products discussed: No .     Postoperative Care            Comments:                AMMY STEARNS CRNA

## 2022-07-24 NOTE — ED TRIAGE NOTES
Pt reports abdominal pain in the RLQ that woke him up out of his sleep. Pt reports vomiting x1 this morning after waking up. Pt reports N/V/D from Monday through Thursday but has felt fine over the last couple of days. Pt denies any abdominal surgeries.

## 2022-07-24 NOTE — INTERVAL H&P NOTE
"I have reviewed the surgical (or preoperative) H&P that is linked to this encounter, and examined the patient. There are no significant changes    Clinical Conditions Present on Arrival:  Clinically Significant Risk Factors Present on Admission                   # Obesity: Estimated body mass index is 34.3 kg/m  as calculated from the following:    Height as of this encounter: 1.854 m (6' 1\").    Weight as of this encounter: 117.9 kg (260 lb).       "

## 2022-07-24 NOTE — ANESTHESIA CARE TRANSFER NOTE
Patient: Gallito Doshi    Procedure: * No procedures listed *       Diagnosis: * No pre-op diagnosis entered *  Diagnosis Additional Information: No value filed.    Anesthesia Type:   General     Note:      Level of Consciousness: awake  Oxygen Supplementation: room air    Independent Airway: airway patency satisfactory and stable  Dentition: dentition unchanged  Vital Signs Stable: post-procedure vital signs reviewed and stable  Report to RN Given: handoff report given  Patient transferred to: ICU    ICU Handoff: Call for PAUSE to initiate/utilize ICU HANDOFF, Identified Patient, Identified Responsible Provider, Reviewed the Pertinent Medical History, Discussed Surgical Course, Reviewed Intra-OP Anesthesia Management and Issues during Anesthesia, Set Expectations for Post Procedure Period and Allowed Opportunity for Questions and Acknowledgement of Understanding      Vitals:  Vitals Value Taken Time   /75 07/24/22 0930   Temp     Pulse 80 07/24/22 0932   Resp 12 07/24/22 0932   SpO2 96 % 07/24/22 0932   Vitals shown include unvalidated device data.    Electronically Signed By: AMMY Gonzalez CRNA  July 24, 2022  9:33 AM

## 2022-07-24 NOTE — ED PROVIDER NOTES
History     Chief Complaint   Patient presents with     Abdominal Pain     HPI  Gallito Doshi is a 22 year old male who comes emerged department complaining of right lower quadrant abdominal pain which woke him from sleep at 3:00 this morning.  He states the pain does not radiate into his back but it radiates across his lower abdomen.  He states he is also nauseous.  Patient relates that he was camping in the [facility] and returned on Saturday.  He states on Monday he was having vomiting and diarrhea.  He states that he had that intermittently until Thursday.  He states he felt well on Friday.  He states earlier in the week he did have fevers and chills.  He denies headache or dizziness.  He denies pain in his chest and is not short of breath.  He has not had hematuria or dysuria.    Allergies:  No Known Allergies    Problem List:    Patient Active Problem List    Diagnosis Date Noted     FHx: hemochromatosis 08/31/2021     Priority: Medium     Moderate episode of recurrent major depressive disorder (H) 11/15/2019     Priority: Medium     Persistent depressive disorder 11/15/2019     Priority: Medium     Oppositional defiant disorder 11/15/2019     Priority: Medium     Disruptive mood dysregulation disorder (H) 11/15/2019     Priority: Medium     Suicidal ideation 11/14/2019     Priority: Medium     Homicidal ideation 01/17/2017     Priority: Medium     Psychosis (H) 12/15/2016     Priority: Medium     Mental health problem 11/28/2016     Priority: Medium     Disorder of teeth and supporting structures 04/26/2006     Priority: Medium     Overview:   IMO Update 10/11          Past Medical History:    No past medical history on file.    Past Surgical History:    Past Surgical History:   Procedure Laterality Date     SCLERAL BUCKLE Right 10/17/2019    Left eye done 03/2020       Family History:    Family History   Problem Relation Age of Onset     Depression Mother      Hemochromatosis Mother      No Known  "Problems Father      No Known Problems Sister      No Known Problems Brother      No Known Problems Brother      Schizophrenia Maternal Grandmother      Hemochromatosis Maternal Grandmother      Suicide Cousin 13        dead     Hemochromatosis Other        Social History:  Marital Status:  Single [1]  Social History     Tobacco Use     Smoking status: Never Smoker     Smokeless tobacco: Never Used   Substance Use Topics     Alcohol use: Never     Drug use: Never        Medications:    ibuprofen (ADVIL/MOTRIN) 800 MG tablet  tiZANidine (ZANAFLEX) 4 MG tablet          Review of Systems   Constitutional: Negative.    HENT: Negative.    Eyes: Negative.    Respiratory: Negative.    Cardiovascular: Negative.    Gastrointestinal: Positive for abdominal pain, diarrhea and nausea.   Endocrine: Negative.    Genitourinary: Negative.    Musculoskeletal: Negative.    Skin: Negative.    Neurological: Negative.    Please see history of chief complaint.  All other systems reviewed and found unremarkable    Physical Exam   BP: 145/97  Pulse: 102  Temp: 98.5  F (36.9  C)  Resp: 16  Height: 185.4 cm (6' 1\")  Weight: 117.9 kg (260 lb)  SpO2: 99 %      Physical Exam 22-year-old gentleman who is awake and alert very pleasant and cooperative with my exam.  HEENT normocephalic extraocular muscles intact pupils equally round at light and oropharynx clear.  Neck is supple his range of motion without pain.  Lungs are clear bilaterally.  Heart maintains regular rate and rhythm.  S1 and S2 sounds are appreciated.  Abdomen is soft patient is moderate voluntary guarding tenderness to palpation in his right lower quadrant.  He also has McBurney's point tenderness.  No flank tenderness.  Extremities a full range of motion no edema.  Neurologic exam no focal deficit.  Dermatologic exam no diffuse skin rashes or lesions    ED Course          CT of the abdomen and pelvis with IV contrast is obtained and it is interpreted by radiology as \"acute " "appendicitis without evidence of appendiceal perforation or abscess.  Appendix arises from the cecum immediately series 3 image 28 with tip inferior to the colon.  No intraluminal calcifications are seen.\"    ED Course as of 07/24/22 0717   Sun Jul 24, 2022   0634 I reassessed the patient and discussed lab and CT findings with him and his family.  He felt improved after IV fentanyl.  I discussed the case with Dr. Zambrano who is her general surgeon on-call this weekend and he very graciously agreed to come to the emergency department to evaluate the patient.  I will start IV Zosyn.  The plan will be to have the patient taken to the OR for appendectomy   0715 Dr. Zambrano here, will take pt to the OR today. OR dispo ordered                       Results for orders placed or performed during the hospital encounter of 07/24/22 (from the past 24 hour(s))   CBC with platelets differential    Narrative    The following orders were created for panel order CBC with platelets differential.  Procedure                               Abnormality         Status                     ---------                               -----------         ------                     CBC with platelets and d...[[id number]]  Abnormal            Final result                 Please view results for these tests on the individual orders.   INR   Result Value Ref [facility]    INR 1.08 0.85 - 1.15   Comprehensive metabolic panel   Result Value Ref [facility]    Sodium 138 133 - 144 mmol/L    Potassium 4.3 3.4 - 5.3 mmol/L    Chloride 107 94 - 109 mmol/L    Carbon Dioxide (CO2) 25 20 - 32 mmol/L    Anion Gap 6 3 - 14 mmol/L    Urea Nitrogen 15 7 - 30 mg/dL    Creatinine 1.05 0.66 - 1.25 mg/dL    Calcium 9.3 8.5 - 10.1 mg/dL    Glucose 100 (H) 70 - 99 mg/dL    Alkaline Phosphatase 158 (H) 40 - 150 U/L    AST 57 (H) 0 - 45 U/L     (H) 0 - 70 U/L    Protein Total 7.6 6.8 - 8.8 g/dL    Albumin 4.3 3.4 - 5.0 g/dL    Bilirubin Total 0.4 0.2 - 1.3 mg/dL    GFR Estimate >90 " >60 mL/min/1.73m2   Lipase   Result Value Ref [facility]    Lipase 104 73 - 393 U/L   Erythrocyte sedimentation rate auto   Result Value Ref [facility]    Erythrocyte Sedimentation Rate 5 0 - 15 mm/hr   CBC with platelets and differential   Result Value Ref [facility]    WBC Count 13.5 (H) 4.0 - 11.0 10e3/uL    RBC Count 4.48 4.40 - 5.90 10e6/uL    Hemoglobin 14.3 13.3 - 17.7 g/dL    Hematocrit 41.0 40.0 - 53.0 %    MCV 92 78 - 100 fL    MCH 31.9 26.5 - 33.0 pg    MCHC 34.9 31.5 - 36.5 g/dL    RDW 11.8 10.0 - 15.0 %    Platelet Count 169 150 - 450 10e3/uL    % Neutrophils 76 %    % Lymphocytes 14 %    % Monocytes 8 %    % Eosinophils 2 %    % Basophils 0 %    % Immature Granulocytes 0 %    NRBCs per 100 WBC 0 <1 /100    Absolute Neutrophils 10.3 (H) 1.6 - 8.3 10e3/uL    Absolute Lymphocytes 1.9 0.8 - 5.3 10e3/uL    Absolute Monocytes 1.0 0.0 - 1.3 10e3/uL    Absolute Eosinophils 0.3 0.0 - 0.7 10e3/uL    Absolute Basophils 0.0 0.0 - 0.2 10e3/uL    Absolute Immature Granulocytes 0.0 <=0.4 10e3/uL    Absolute NRBCs 0.0 10e3/uL   Extra Tube    Narrative    The following orders were created for panel order Extra Tube.  Procedure                               Abnormality         Status                     ---------                               -----------         ------                     Extra Red Top Tube[[id number]]                               Final result               Extra Green Top (Lithium...[[id number]]                      Final result                 Please view results for these tests on the individual orders.   Extra Red Top Tube   Result Value Ref [facility]    Hold Specimen JIC    Extra Green Top (Lithium Heparin) ON ICE   Result Value Ref [facility]    Hold Specimen ok    UA with Microscopic reflex to Culture    Specimen: Urine, Midstream   Result Value Ref [place]    Color Urine Straw Colorless, Straw, Light Yellow, Yellow    Appearance Urine Clear Clear    Glucose Urine Negative Negative mg/dL    Bilirubin Urine Negative  Negative    Ketones Urine Negative Negative mg/dL    Specific Gravity Urine 1.017 1.003 - 1.035    Blood Urine Negative Negative    pH Urine 5.0 4.7 - 8.0    Protein Albumin Urine Negative Negative mg/dL    Urobilinogen Urine Normal Normal, 2.0 mg/dL    Nitrite Urine Negative Negative    Leukocyte Esterase Urine Negative Negative    Mucus Urine Present (A) None Seen /LPF    RBC Urine <1 <=2 /HPF    WBC Urine <1 <=5 /HPF    Squamous Epithelials Urine 0 <=1 /HPF    Narrative    Urine Culture not indicated       Medications   0.9% sodium chloride BOLUS (0 mLs Intravenous Stopped 7/24/22 0630)     Followed by   sodium chloride 0.9% infusion (has no administration in time range)   ondansetron (ZOFRAN) injection 4 mg (4 mg Intravenous Given 7/24/22 0454)   fentaNYL (PF) (SUBLIMAZE) injection 50 mcg (50 mcg Intravenous Given 7/24/22 0456)   iopamidol (ISOVUE-370) solution 128 mL (128 mLs Intravenous Given 7/24/22 0535)   sodium chloride (PF) 0.9% PF flush 60 mL (60 mLs Intravenous Given 7/24/22 0535)   fentaNYL (PF) (SUBLIMAZE) injection 50 mcg (50 mcg Intravenous Given 7/24/22 0605)   piperacillin-tazobactam (ZOSYN) infusion 3.375 g (0 g Intravenous Stopped 7/24/22 0710)       Assessments & Plan (with Medical Decision Making)     I have reviewed the nursing notes.    I have reviewed the findings, diagnosis, plan and need for follow up with the patient.  The plan is for the patient to be taken from the emergency department to the operating room for appendectomy by Dr. Zambrano.    New Prescriptions    No medications on file       Final diagnoses:   Acute appendicitis with localized peritonitis, without perforation, abscess, or gangrene       7/24/2022   [facility]     Michael Pardo DO  07/24/22 0636       Sree Sexton MD  07/24/22 3346

## 2022-07-24 NOTE — LETTER
[facility]  [address]  [place] [address]  Phone: [phone]    July 24, 2022        Gallito Doshi  [address] [address]          To whom it may concern:    RE: Gallito Doshi    Please excuse Gallito from work for two weeks starting 7/25/2022 due to a procedure he had on 7/24/2022.    Please contact me for questions or concerns.      Sincerely,        Dr. ADOLFO Zambrano/Liam Rossi

## 2022-07-24 NOTE — OR NURSING
Patient and responsible adult given discharge instructions with no questions regarding instructions. Bdu score 19. Pain level 3/10.  Discharged from unit via wheel chair. Patient discharged to home.

## 2022-07-24 NOTE — OP NOTE
General Surgery Operative Note    Pre-operative diagnosis: Acute appendicitis with localized peritonitis, without perforation, abscess, or gangrene [K35.30]   Post-operative diagnosis same   Procedure: Procedure(s):  APPENDECTOMY, LAPAROSCOPIC   Surgeon: Kenny Zambrano DO   Assistants(s): None   Anesthesia: General    Estimated blood loss: 5 ml    Total IV fluids: (See anesthesia record)   Blood transfusion: No transfusion was given during surgery   Total urine output: (See anesthesia record)   Drains or packing: None   Specimens: Appendix   Implants: None   Findings: Acute appendicitis    Complications: None   Condition on discharge: Stable         DESCRIPTION OF PROCEDURE:  The patient was placed on the table in supine position.  General endotracheal anesthesia was induced and the abdomen was prepped and draped in standard sterile fashion.  Local was injected and an incision was made just above the umbilicus with an 11 blade.  A 5mm visiport was placed under direct vision in the usual fashion and the abdomen was insufflated with CO2.  Two 5 mm trocars were placed in the infraumbilical midline, one two fingerbreadths above the pubic symphysis and one on the left side.  The umbilical port was changed to a 12mm port. The patient was placed in Trendelenburg and right side up.  The appendix was identified in the right lower quadrant.  It appeared acutely inflamed. The mesoappendix was  from the appendix bluntly and ligated and divided using the endoGIA. The base of the appendix was ligated using the endoGIA.  The appendix was passed into an Endocatch bag and removed through the umbilical trocar site.  The right lower quadrant was inspected for hemostasis.  Hemostasis was assured.  We irrigated with copious amounts of sterile saline and aspirated the effluent.  Using a suture passer and 0 vicryl a figure eight stitch was placed to close the umbilical port. The abdomen was evacuated of CO2 and the trocars  were removed under direct visualization.  There was no bleeding from any of these sites.  The skin of all 3 incisions was anesthetized with local anesthetic and closed with interrupted 3-0 Vicryl deep subcuticular sutures. Dermabond was used to approximate the epidermis and small Steri-Strips were imbedded in the glue.  The patient tolerated the procedure well.  Sponge and instrument counts were correct.    Kenny Zambrano D.O.

## 2022-07-24 NOTE — ANESTHESIA PROCEDURE NOTES
Airway       Patient location during procedure: OR       Procedure Start/Stop Times: 7/24/2022 8:26 AM and 7/24/2022 8:29 AM  Staff -        CRNA: Vik Joseph APRN CRNA       Performed By: CRNA  Consent for Airway        Urgency: elective  Indications and Patient Condition       Indications for airway management: radha-procedural       Induction type:intravenous       Mask difficulty assessment: 1 - vent by mask    Final Airway Details       Final airway type: endotracheal airway       Successful airway: ETT - single and Oral  Endotracheal Airway Details        ETT size (mm): 7.5       Cuffed: yes       Successful intubation technique: direct laryngoscopy       DL Blade Type: Cooley 2       Grade View of Cords: 2       Position: Right       Measured from: lips       Secured at (cm): 22       Bite block used: None    Post intubation assessment        Placement verified by: capnometry, equal breath sounds and chest rise        Number of attempts at approach: 1       Number of other approaches attempted: 0       Secured with: plastic tape       Ease of procedure: easy       Dentition: Intact and Unchanged    Medication(s) Administered   Medication Administration Time: 7/24/2022 8:26 AM

## 2022-07-24 NOTE — H&P (VIEW-ONLY)
History     Chief Complaint   Patient presents with     Abdominal Pain     HPI  Gallito Doshi is a 22 year old male who comes emerged department complaining of right lower quadrant abdominal pain which woke him from sleep at 3:00 this morning.  He states the pain does not radiate into his back but it radiates across his lower abdomen.  He states he is also nauseous.  Patient relates that he was camping in the [facility] and returned on Saturday.  He states on Monday he was having vomiting and diarrhea.  He states that he had that intermittently until Thursday.  He states he felt well on Friday.  He states earlier in the week he did have fevers and chills.  He denies headache or dizziness.  He denies pain in his chest and is not short of breath.  He has not had hematuria or dysuria.    Allergies:  No Known Allergies    Problem List:    Patient Active Problem List    Diagnosis Date Noted     FHx: hemochromatosis 08/31/2021     Priority: Medium     Moderate episode of recurrent major depressive disorder (H) 11/15/2019     Priority: Medium     Persistent depressive disorder 11/15/2019     Priority: Medium     Oppositional defiant disorder 11/15/2019     Priority: Medium     Disruptive mood dysregulation disorder (H) 11/15/2019     Priority: Medium     Suicidal ideation 11/14/2019     Priority: Medium     Homicidal ideation 01/17/2017     Priority: Medium     Psychosis (H) 12/15/2016     Priority: Medium     Mental health problem 11/28/2016     Priority: Medium     Disorder of teeth and supporting structures 04/26/2006     Priority: Medium     Overview:   IMO Update 10/11          Past Medical History:    No past medical history on file.    Past Surgical History:    Past Surgical History:   Procedure Laterality Date     SCLERAL BUCKLE Right 10/17/2019    Left eye done 03/2020       Family History:    Family History   Problem Relation Age of Onset     Depression Mother      Hemochromatosis Mother      No Known  "Problems Father      No Known Problems Sister      No Known Problems Brother      No Known Problems Brother      Schizophrenia Maternal Grandmother      Hemochromatosis Maternal Grandmother      Suicide Cousin 13        dead     Hemochromatosis Other        Social History:  Marital Status:  Single [1]  Social History     Tobacco Use     Smoking status: Never Smoker     Smokeless tobacco: Never Used   Substance Use Topics     Alcohol use: Never     Drug use: Never        Medications:    ibuprofen (ADVIL/MOTRIN) 800 MG tablet  tiZANidine (ZANAFLEX) 4 MG tablet          Review of Systems   Constitutional: Negative.    HENT: Negative.    Eyes: Negative.    Respiratory: Negative.    Cardiovascular: Negative.    Gastrointestinal: Positive for abdominal pain, diarrhea and nausea.   Endocrine: Negative.    Genitourinary: Negative.    Musculoskeletal: Negative.    Skin: Negative.    Neurological: Negative.    Please see history of chief complaint.  All other systems reviewed and found unremarkable    Physical Exam   BP: 145/97  Pulse: 102  Temp: 98.5  F (36.9  C)  Resp: 16  Height: 185.4 cm (6' 1\")  Weight: 117.9 kg (260 lb)  SpO2: 99 %      Physical Exam 22-year-old gentleman who is awake and alert very pleasant and cooperative with my exam.  HEENT normocephalic extraocular muscles intact pupils equally round at light and oropharynx clear.  Neck is supple his range of motion without pain.  Lungs are clear bilaterally.  Heart maintains regular rate and rhythm.  S1 and S2 sounds are appreciated.  Abdomen is soft patient is moderate voluntary guarding tenderness to palpation in his right lower quadrant.  He also has McBurney's point tenderness.  No flank tenderness.  Extremities a full range of motion no edema.  Neurologic exam no focal deficit.  Dermatologic exam no diffuse skin rashes or lesions    ED Course          CT of the abdomen and pelvis with IV contrast is obtained and it is interpreted by radiology as \"acute " "appendicitis without evidence of appendiceal perforation or abscess.  Appendix arises from the cecum immediately series 3 image 28 with tip inferior to the colon.  No intraluminal calcifications are seen.\"    ED Course as of 07/24/22 0717   Sun Jul 24, 2022   0634 I reassessed the patient and discussed lab and CT findings with him and his family.  He felt improved after IV fentanyl.  I discussed the case with Dr. Zambrano who is her general surgeon on-call this weekend and he very graciously agreed to come to the emergency department to evaluate the patient.  I will start IV Zosyn.  The plan will be to have the patient taken to the OR for appendectomy   0715 Dr. Zambrano here, will take pt to the OR today. OR dispo ordered                       Results for orders placed or performed during the hospital encounter of 07/24/22 (from the past 24 hour(s))   CBC with platelets differential    Narrative    The following orders were created for panel order CBC with platelets differential.  Procedure                               Abnormality         Status                     ---------                               -----------         ------                     CBC with platelets and d...[[id number]]  Abnormal            Final result                 Please view results for these tests on the individual orders.   INR   Result Value Ref Range    INR 1.08 0.85 - 1.15   Comprehensive metabolic panel   Result Value Ref Range    Sodium 138 133 - 144 mmol/L    Potassium 4.3 3.4 - 5.3 mmol/L    Chloride 107 94 - 109 mmol/L    Carbon Dioxide (CO2) 25 20 - 32 mmol/L    Anion Gap 6 3 - 14 mmol/L    Urea Nitrogen 15 7 - 30 mg/dL    Creatinine 1.05 0.66 - 1.25 mg/dL    Calcium 9.3 8.5 - 10.1 mg/dL    Glucose 100 (H) 70 - 99 mg/dL    Alkaline Phosphatase 158 (H) 40 - 150 U/L    AST 57 (H) 0 - 45 U/L     (H) 0 - 70 U/L    Protein Total 7.6 6.8 - 8.8 g/dL    Albumin 4.3 3.4 - 5.0 g/dL    Bilirubin Total 0.4 0.2 - 1.3 mg/dL    GFR Estimate >90 " >60 mL/min/1.73m2   Lipase   Result Value Ref Range    Lipase 104 73 - 393 U/L   Erythrocyte sedimentation rate auto   Result Value Ref Range    Erythrocyte Sedimentation Rate 5 0 - 15 mm/hr   CBC with platelets and differential   Result Value Ref Range    WBC Count 13.5 (H) 4.0 - 11.0 10e3/uL    RBC Count 4.48 4.40 - 5.90 10e6/uL    Hemoglobin 14.3 13.3 - 17.7 g/dL    Hematocrit 41.0 40.0 - 53.0 %    MCV 92 78 - 100 fL    MCH 31.9 26.5 - 33.0 pg    MCHC 34.9 31.5 - 36.5 g/dL    RDW 11.8 10.0 - 15.0 %    Platelet Count 169 150 - 450 10e3/uL    % Neutrophils 76 %    % Lymphocytes 14 %    % Monocytes 8 %    % Eosinophils 2 %    % Basophils 0 %    % Immature Granulocytes 0 %    NRBCs per 100 WBC 0 <1 /100    Absolute Neutrophils 10.3 (H) 1.6 - 8.3 10e3/uL    Absolute Lymphocytes 1.9 0.8 - 5.3 10e3/uL    Absolute Monocytes 1.0 0.0 - 1.3 10e3/uL    Absolute Eosinophils 0.3 0.0 - 0.7 10e3/uL    Absolute Basophils 0.0 0.0 - 0.2 10e3/uL    Absolute Immature Granulocytes 0.0 <=0.4 10e3/uL    Absolute NRBCs 0.0 10e3/uL   Extra Tube    Narrative    The following orders were created for panel order Extra Tube.  Procedure                               Abnormality         Status                     ---------                               -----------         ------                     Extra Red Top Tube[[id number]]                               Final result               Extra Green Top (Lithium...[[id number]]                      Final result                 Please view results for these tests on the individual orders.   Extra Red Top Tube   Result Value Ref Range    Hold Specimen JIC    Extra Green Top (Lithium Heparin) ON ICE   Result Value Ref Range    Hold Specimen ok    UA with Microscopic reflex to Culture    Specimen: Urine, Midstream   Result Value Ref Range    Color Urine Straw Colorless, Straw, Light Yellow, Yellow    Appearance Urine Clear Clear    Glucose Urine Negative Negative mg/dL    Bilirubin Urine Negative  Negative    Ketones Urine Negative Negative mg/dL    Specific Gravity Urine 1.017 1.003 - 1.035    Blood Urine Negative Negative    pH Urine 5.0 4.7 - 8.0    Protein Albumin Urine Negative Negative mg/dL    Urobilinogen Urine Normal Normal, 2.0 mg/dL    Nitrite Urine Negative Negative    Leukocyte Esterase Urine Negative Negative    Mucus Urine Present (A) None Seen /LPF    RBC Urine <1 <=2 /HPF    WBC Urine <1 <=5 /HPF    Squamous Epithelials Urine 0 <=1 /HPF    Narrative    Urine Culture not indicated       Medications   0.9% sodium chloride BOLUS (0 mLs Intravenous Stopped 7/24/22 0630)     Followed by   sodium chloride 0.9% infusion (has no administration in time range)   ondansetron (ZOFRAN) injection 4 mg (4 mg Intravenous Given 7/24/22 0454)   fentaNYL (PF) (SUBLIMAZE) injection 50 mcg (50 mcg Intravenous Given 7/24/22 0456)   iopamidol (ISOVUE-370) solution 128 mL (128 mLs Intravenous Given 7/24/22 0535)   sodium chloride (PF) 0.9% PF flush 60 mL (60 mLs Intravenous Given 7/24/22 0535)   fentaNYL (PF) (SUBLIMAZE) injection 50 mcg (50 mcg Intravenous Given 7/24/22 0605)   piperacillin-tazobactam (ZOSYN) infusion 3.375 g (0 g Intravenous Stopped 7/24/22 0710)       Assessments & Plan (with Medical Decision Making)     I have reviewed the nursing notes.    I have reviewed the findings, diagnosis, plan and need for follow up with the patient.  The plan is for the patient to be taken from the emergency department to the operating room for appendectomy by Dr. Zambrano.    New Prescriptions    No medications on file       Final diagnoses:   Acute appendicitis with localized peritonitis, without perforation, abscess, or gangrene       7/24/2022   [facility]     Michael Pardo DO  07/24/22 0636       Sree Sexton MD  07/24/22 3453

## 2022-07-24 NOTE — ANESTHESIA POSTPROCEDURE EVALUATION
Patient: Gallito Doshi    Procedure: Procedure(s):  APPENDECTOMY, LAPAROSCOPIC       Anesthesia Type:  General    Note:  Disposition: Outpatient   Postop Pain Control: Uneventful            Sign Out: Well controlled pain   PONV: No   Neuro/Psych: Uneventful            Sign Out: Acceptable/Baseline neuro status   Airway/Respiratory: Uneventful            Sign Out: Acceptable/Baseline resp. status   CV/Hemodynamics: Uneventful            Sign Out: Acceptable CV status; No obvious hypovolemia; No obvious fluid overload   Other NRE: NONE   DID A NON-ROUTINE EVENT OCCUR? No           Last vitals:  Vitals Value Taken Time   /59 07/24/22 1000   Temp 97.2  F (36.2  C) 07/24/22 0954   Pulse 70 07/24/22 1004   Resp 5 07/24/22 1004   SpO2 94 % 07/24/22 1004   Vitals shown include unvalidated device data.    Electronically Signed By: AMMY Gonzalez CRNA  July 24, 2022  10:05 AM

## 2022-07-25 ENCOUNTER — TELEPHONE (OUTPATIENT)
Dept: SURGERY | Facility: CLINIC | Age: 22
End: 2022-07-25

## 2022-07-25 NOTE — TELEPHONE ENCOUNTER
Type of surgery: Appendectomy laparoscopic   Location of surgery: Other: [facility]  Date and time of surgery: 07/24/2022  Surgeon: Juan Manuel  Pre-Op Appt Date: seen in the ER  Post-Op Appt Date: none   Packet sent out: No  Pre-cert/Authorization completed:  No  Date:

## 2022-07-27 LAB
PATH REPORT.COMMENTS IMP SPEC: NORMAL
PATH REPORT.FINAL DX SPEC: NORMAL
PATH REPORT.GROSS SPEC: NORMAL
PATH REPORT.MICROSCOPIC SPEC OTHER STN: NORMAL
PATH REPORT.RELEVANT HX SPEC: NORMAL
PHOTO IMAGE: NORMAL

## 2022-08-04 NOTE — ED NOTES
MD Notification    Notified Person: DR. Hurst    Notification Date/Time:  8/4/2022  10:33 AM    Notification Interaction:   AMCOM    Purpose of Notification:  Pt HR trending up to 120's- ST and BP increasing to 145 systolic - no PRN's, concern for high ICP    Orders Received:   MD called back, acknowledged, no new orders       Patient eating his meal. Remains cooperative and calm and on 1:1 staff observation. Mother remains in the consult room, updated on the patient's status. Mother reports she did talk to the DEC  by phone. A call was received from [facility] and they were given information and will call back.

## 2022-08-09 ENCOUNTER — OFFICE VISIT (OUTPATIENT)
Dept: SURGERY | Facility: OTHER | Age: 22
End: 2022-08-09
Attending: NURSE PRACTITIONER
Payer: COMMERCIAL

## 2022-08-09 VITALS
HEART RATE: 73 BPM | RESPIRATION RATE: 14 BRPM | OXYGEN SATURATION: 98 % | HEIGHT: 73 IN | SYSTOLIC BLOOD PRESSURE: 118 MMHG | WEIGHT: 260 LBS | DIASTOLIC BLOOD PRESSURE: 70 MMHG | BODY MASS INDEX: 34.46 KG/M2 | TEMPERATURE: 98 F

## 2022-08-09 DIAGNOSIS — Z90.49 STATUS POST APPENDECTOMY: Primary | ICD-10-CM

## 2022-08-09 PROCEDURE — 99024 POSTOP FOLLOW-UP VISIT: CPT | Performed by: NURSE PRACTITIONER

## 2022-08-09 ASSESSMENT — PAIN SCALES - GENERAL: PAINLEVEL: NO PAIN (0)

## 2022-08-09 NOTE — NURSING NOTE
"Chief Complaint   Patient presents with     Surgical Followup     7/24/22 , laparoscopic appendectomy        Initial /70 (BP Location: Right arm, Cuff Size: Adult Large)   Pulse 73   Temp 98  F (36.7  C) (Tympanic)   Resp 14   Ht 1.854 m (6' 1\")   Wt 117.9 kg (260 lb)   SpO2 98%   BMI 34.30 kg/m   Estimated body mass index is 34.3 kg/m  as calculated from the following:    Height as of this encounter: 1.854 m (6' 1\").    Weight as of this encounter: 117.9 kg (260 lb).  Medication Reconciliation: complete  Leti Fernandez LPN  "

## 2022-08-09 NOTE — PROGRESS NOTES
"CLINIC NOTE - POST-OP SURGERY  8/9/2022    Patient:Gallito Doshi    Procedure: Appendectomy  laparoscopic     This is a 22 year old male who is 2 weeks s/p Appendectomy  laparoscopic .  The patient has no complaints today.     Current Medications:  Current Outpatient Medications   Medication Sig Dispense Refill     acetaminophen (TYLENOL) 325 MG tablet Take 2 tablets (650 mg) by mouth every 4 hours as needed for mild pain 50 tablet 0     ibuprofen (ADVIL/MOTRIN) 800 MG tablet Take 1 tablet (800 mg) by mouth every 8 hours as needed for moderate pain 30 tablet 0       Allergies:  No Known Allergies    PHYSICAL EXAM:   Vital signs: /70 (BP Location: Right arm, Cuff Size: Adult Large)   Pulse 73   Temp 98  F (36.7  C) (Tympanic)   Resp 14   Ht 1.854 m (6' 1\")   Wt 117.9 kg (260 lb)   SpO2 98%   BMI 34.30 kg/m     BMI: Body mass index is 34.3 kg/m .   General: Normal, healthy, cooperative, in no acute distress, alert   Lungs: respirations are non-labored   Abdominal: non-distended   Incisional sites are healed without signs/symptoms of infection       PATHOLOGY:  Appendix, appendectomy:  -Acute appendicitis.    ASSESSMENT:    22 year old male who is 2 weeks s/p Appendectomy  laparoscopic .  Doing well.     PLAN:    Follow-up as needed      Work note was given    "

## 2022-08-09 NOTE — LETTER
August 9, 2022      Gallito Doshi  [address] [address]        To Whom It May Concern:    Gallito Doshi was seen in our clinic. He may return to work without restrictions.      Sincerely,        Constance Torres, NP

## 2022-08-24 NOTE — ED NOTES
MEDICAL ELIGIBILITY FORM    Name: June Archibald YOB: 2008     June is Medically eligible for all sports without restriction    Recommendations: N/A    I have examined the student named on this form and completed the preparticipation physical evaluation. The athlete does not have apparent clinical contraindications to practice and can participate in the sport(s) as outlined on this form. A copy of the physical examination findings are on record in my office and can be made available to the school at the request of the parents. If conditions arise after the athlete has been cleared for participation, the physician may rescind the medical eligibility until the problem is resolved and the potential consequences are completely explained to the athlete (and parents or guardians).    Name of health care professional (print or type): Jackie Miller MD Date: 8/24/2022     Address: [address] [address]  Dept: [phone]     Signature of health care professional: __ Jackie Miller MD _____________________________________      SHARED EMERGENCY INFORMATION    No Known Allergies    No current outpatient medications    Other information:_____________________________________________________________________  _____________________________________________________________________________________  _____________________________________________________________________________________    Emergency contacts:___________________________________________________________________  _____________________________________________________________________________________  _____________________________________________________________________________________     © 2019 [place] [employer], American Academy of Pediatrics, American College of Sport Medicine, American Medical Society for Sports Medicine, American  Mother Constance contacted by phone, her cell phone number changed. Permission received to transfer the patient to [facility] for behavioral health care. Constance states she is not sure when she is going to go down to [facility] to see the patient.  Constance Doshi's (mother) new phone number is [phone].   Orthopa[employer]ics Society for Sport Medicine, and American Osteopathic Academy of Sports Medicine.  Permission is granted to reprint for noncommercial, educational purposes with acknowledgement.      PHYSICAL EXAMINATION FORM     Name: June Archibald YOB: 2008   PHYSICIAN REMINDERS  1. Consider additional questions on more-sensitive issues.  · Do you feel stressed out or under a lot of pressure?  · Do you feel sad, hopeless, depressed or anxious?  · Do you feel safe at your home or residence?  · During the past 30 days, did you use chewing tobacco, snuff or dip?  · Do you drink alcohol or user any other drugs?  · Have you even taken anabolic steroids or used any other performance-enhancing supplement?  · Have you even take any supplements to help you gain or lose weight or improve your performance?  · Do you wear a seat belt, use a helmet, and use condoms?  2.  Consider reviewing questions on cardiovascular symptoms (Q4-Q13 of History Form).    EXAMINATION     Vitals: /63   Pulse 83   Temp 97.2 °F (36.2 °C)   Ht 5' (1.524 m)   Wt 43.6 kg (96 lb 1.9 oz)   BMI 18.77 kg/m²   BSA 1.37 m²    Vision: R 20/               L 20/                      Corrected  Y         N     MEDICAL NORMAL ABNORMAL FINDINGS   Appearance  · Marfan stigmata (kyphoscoliosis, high-arched palate, pectus excavatum, arachnodactyly, arm span > height, hyperlaxity, myopia, MVP, aortic insufficiency) Yes    Eyes, ears, nose, throat  · Pupils equal  · Hearing Yes    Lymph nodes Yes    Heart*  · Murmurs (auscultation standing, auscultation supine, +/- Valsalva maneuver) Yes    Lungs Yes    Abdomen Yes    Skin  · Herpes simplex virus (HSV), lesions suggestive of methicillin-resistant Staphylococcus aureus MRSA, or tinea corporis Yes    MUSCULOSKELETAL NORMAL ABNORMAL FINDINGS   Neck Yes    Back Yes    Shoulder and arm Yes    Elbow and forearm Yes    Wrist, hand, and fingers Yes    Hip and thigh Yes    Knee Yes    Leg and ankle Yes     Foot and toes Yes    Functional  · Double-leg squat test, single-leg squat test, and box drop or step drop test Yes    * Consider electrocardiography ECG, echocardiogram, referral to cardiology for abnormal cardiac history or examination finding, or a combination of those.  Name of health care professional (print or type): Jackie Miller MD  Date: 8/24/2022  Address: [facility] [address] [facility]  [address] [address]  Dept: [phone]   Signature of health care professional: _ Jackie Miller MD ______________________________________    © 2019 American Academy of Family Physicians, American Academy of Pediatrics, American College of Sport Medicine, American Medical Society for Sports Medicine, American Orthopaedics Society for Sport Medicine, and American Osteopathic Academy of Sports Medicine.  Permission is granted to reprint for noncommercial, educational purposes with acknowledgement.         Name: June Archibald YOB: 2008   Supplemental COVID-19 questions     1.  Have you had any of the following symptoms in the past 14 days?           a) Fever or chills Yes  /  No          b) Cough Yes  /  No          c) Shortness of breath or difficulty breathing Yes  /  No          d) Fatigue Yes  /  No          e) Muscle or body aches Yes  /  No          f) Headache Yes  /  No          g) New loss of taste or smell Yes  /  No          h) Sore throat Yes  /  No          i) Congestion or runny nose Yes  /  No          j) Nausea or vomiting Yes  /  No          k) Diarrhea Yes  /  No          l) Date symptoms started _______          m) Date symptoms resolved _______   2.  Have you ever had a positive test for COVID-19? Yes  /  No          If yes _______                 i.  Date of test Yes  /  No                 ii. Were you tested because you had symptoms? Yes  /  No                 If yes:                         a) Date symptoms started  _______                        b) Date symptoms resolved _______                        c) Were you hospitalized? Yes  /  No                        d) Did you have fever > 100.4 F.? Yes  /  No                               If yes, how many days did your fever last? _______                        e) Did you have muscle aches, chills, or lethargy? Yes  /  No                               If yes, how many days did these symptoms last? _______                        f) Have you had the vaccine? Yes  /  No                 iii. Were you tested because you were exposed to someone with COVID-19, but you did not have                     any symptoms? Yes  /  No   3. Has anyone living in your household had any of the following symptoms or tested positive for COVID-19 in the past 14 days? Yes  /  No          If Yes, [place] the applicable symptoms.     • Fever or chills • Shortness of breath or difficulty breathing    • Muscle or body aches • New loss of taste or smell    • Nausea or vomiting • Congestion or runny nose    • Sore throat           • Headache           • Cough • Fatigue           • Diarrhea       4. Have you been within 6 feet for more than 15 minutes of someone with COVID-19 in the past 14 days? Yes  /  No          If yes: date(s) of exposure _______   5. Are you currently waiting on results from a recent COVID test? Yes  /  No     Sources:  • Interim Guidance on the Preparticipation Physical Examination... : Clinical Journal of Sport Medicine (lww.com)  • Supplemental COVID19 Questions (lww.com)  • COVID19 Interim Guidance: Return to Sports and Physical Activity (aap.org)      HISTORY FORM  Note: Complete and sign this form (with your parents if younger than 18) before your appointment.  Name: June Archibald YOB: 2008     Date of examination: 8/24/2022  Sport(s):_________________________________________   Sex assigned at birth: (F, M, or other): female How do you identify your gender?(F, M, or  other):_________     List past and current medical conditions:____________________________________________________________________  _______________________________________________________________________________________________________________    Have you ever had surgery? If yes, list all past surgical procedures: ______________________________________________  _______________________________________________________________________________________________________________    Medicines and supplements: List all current prescriptions, over-the-counter medicines, and supplements (herbal and nutritional):   ______________________________________________________________________________________________________________  ______________________________________________________________________________________________________________    Do you have any allergies? If yes, please list all your allergies (ie, medicines, pollens, food, stinging insects).   ______________________________________________________________________________________________________________  ______________________________________________________________________________________________________________       Patient Health Questionnaire Version 4 (PHQ-4)  Over the last 2 weeks, how often have you been bothered by any of the following problems? ([place] response.)   Not at all Several days Over half the days Nearly every day   Feeling nervous, anxious, or on edge 0 1 2 3   Not being able to stop or control worrying 0 1 2 3   Little interest or pleasure in doing things 0 1 2 3   Feeling down, depressed, or hopeless 0 1 2 3   (A sum of ?3 is considered positive on either subscale [questions 1 and 2, or questions 3 and 4] for screening purposes.)     GENERAL QUESTIONS  (Explain “Yes” answers at the end of this form.  [place] questions if you don’t know the answer.)     Yes     No   1. Do you have any concerns that you would like to discuss with your provider?    x    2. Has a provider ever denied or restricted your participation in sports for any reason?    x   3. Do you have any ongoing medical issues or recent illness?    x   HEART HEALTH QUESTIONS ABOUT YOU Yes No   4. Have you ever passed out or nearly passed out during or after exercise?    x   5. Have you ever had discomfort, pain, tightness, or pressure in your chest during exercise?    x   6. Does your heart ever race, flutter in your chest, or skip beats (irregular beats) during exercise?    x   7. Has a doctor ever told you that you have any heart problems?    x   8. Has a doctor ever requested a test for your heart? For example, electrocardiography (ECG) or echocardiography.  x    HEART HEALTH QUESTIONS ABOUT YOU (CONTINUED ) Yes No   9. Do you get light-headed or feel shorter of breath than your friends during exercise?    x   10. Have you ever had a seizure?    x   HEART HEALTH QUESTIONS ABOUT YOUR FAMILY Yes No   11. Has any family member or relative  of heart problems or had an unexpected or unexplained sudden death before age 35 years (including drowning or unexplained car crash)?    x   12. Does anyone in your family have a genetic heart problem such as hypertrophic cardiomyopathy (HCM), Marfan syndrome, arrhythmogenic right ventricular cardiomyopathy (ARVC), long QT syndrome (LQTS), short QT syndrome (SQTS), Brugada syndrome, or catecholaminergic polymorphic ventricular tachycardia (CPVT)?    x   13. Has anyone in your family had a pacemaker or an implanted defibrillator before age 35?    x            Name: June Archibald YOB: 2008     BONE AND JOINT QUESTIONS Yes No   14. Have you ever had a stress fracture or an injury to a bone, muscle, ligament, joint, or tendon that caused you to miss a practice or game?    x   15. Do you have a bone, muscle, ligament, or joint injury that bothers you?    x   MEDICAL QUESTIONS Yes No   16. Do you cough, wheeze, or have difficulty breathing during or  after exercise?    x   17. Are you missing a kidney, an eye, a testicle (males), your spleen, or any other organ?    x   18. Do you have groin or testicle pain or a painful bulge or hernia in the groin area?    x   19. Do you have any recurring skin rashes or rashes that come and go, including herpes or methicillin-resistant Staphylococcus aureus  (MRSA)?    x   20. Have you had a concussion or head injury that caused confusion, a prolonged headache, or memory problems?    x   21. Have you ever had numbness, had tingling, had weakness in your arms or legs, or been unable to move your arms or legs after being hit or falling?    x   22. Have you ever become ill while exercising in the heat?    x   23. Do you or does someone in your family have sickle cell trait or disease?    x   24. Have you ever had or do you have any problems with your eyes or vision?        MEDICAL QUESTIONS (CONTINUED ) Yes No   25. Do you worry about your weight?      x   26. Are you trying to or has anyone recommended that you gain or lose weight?    x   27. Are you on a special diet or do you avoid certain types of foods or food groups?    x   28. Have you ever had an eating disorder?    x   FEMALES ONLY     29. Have you ever had a menstrual period?   x    30. How old were you when you had your first menstrual period?   13    31. When was your most recent menstrual period?   2/2022    32. How many periods have you had in the past 12 months?   1      Explain \"Yes\" answers here.  ______________________________________________    ______________________________________________    ______________________________________________    ______________________________________________    ______________________________________________    ______________________________________________    ______________________________________________    ______________________________________________     I hereby state that, to the best of my knowledge, my answers to the  questions on this form are complete and correct.    Signature of athlete: ____________________________________________________________________________________    Signature of parent or guardian: ___________________________________________________________________________  Date: ________________________________________________  _____________________________________________________________________________________________________  © 2019 American Academy of Family Physicians, American Academy of Pediatrics, American College of Sports Medicine, American Medical Society for Sports Medicine, American Orthopaedic Society for Sports Medicine, and American Osteopathic Academy of Sports Medicine. Permission is granted to reprint for noncommercial, educational purposes with acknowledgment.

## 2023-01-15 ENCOUNTER — HEALTH MAINTENANCE LETTER (OUTPATIENT)
Age: 23
End: 2023-01-15

## 2023-04-22 ENCOUNTER — HOSPITAL ENCOUNTER (EMERGENCY)
Facility: HOSPITAL | Age: 23
Discharge: HOME | End: 2023-04-22
Payer: COMMERCIAL

## 2023-04-22 VITALS
HEART RATE: 94 BPM | DIASTOLIC BLOOD PRESSURE: 86 MMHG | RESPIRATION RATE: 16 BRPM | OXYGEN SATURATION: 98 % | TEMPERATURE: 98.2 F | SYSTOLIC BLOOD PRESSURE: 158 MMHG

## 2023-04-22 DIAGNOSIS — J06.9 VIRAL UPPER RESPIRATORY TRACT INFECTION: ICD-10-CM

## 2023-04-22 PROCEDURE — 87651 STREP A DNA AMP PROBE: CPT

## 2023-04-22 PROCEDURE — 99213 OFFICE O/P EST LOW 20 MIN: CPT | Mod: CS

## 2023-04-22 PROCEDURE — G0463 HOSPITAL OUTPT CLINIC VISIT: HCPCS

## 2023-04-22 PROCEDURE — C9803 HOPD COVID-19 SPEC COLLECT: HCPCS

## 2023-04-22 PROCEDURE — 87637 SARSCOV2&INF A&B&RSV AMP PRB: CPT

## 2023-04-22 ASSESSMENT — ENCOUNTER SYMPTOMS
SORE THROAT: 1
CHILLS: 0
COUGH: 1
FEVER: 1
SHORTNESS OF BREATH: 0

## 2023-04-22 ASSESSMENT — ACTIVITIES OF DAILY LIVING (ADL): ADLS_ACUITY_SCORE: 37

## 2023-04-22 NOTE — Clinical Note
Gallito Doshi was seen and treated in our emergency department on 4/22/2023.  He may return to work on 04/23/2023.       If you have any questions or concerns, please don't hesitate to call.      Evangelista Shukla PA-C

## 2023-04-22 NOTE — ED PROVIDER NOTES
History     Chief Complaint   Patient presents with     Fever     Cough     Pharyngitis     HPI  Gallito Doshi is a 22 year old male who presents to clinic with a 2-day history of cough, sore throat and fever.  Has been taking Tylenol and ibuprofen at home with moderate relief of symptoms.  Denies associated ear pain, shortness of breath.    Allergies:  No Known Allergies    Problem List:    Patient Active Problem List    Diagnosis Date Noted     Acute appendicitis with localized peritonitis, without perforation, abscess, or gangrene 07/24/2022     Priority: Medium     FHx: hemochromatosis 08/31/2021     Priority: Medium     Moderate episode of recurrent major depressive disorder (H) 11/15/2019     Priority: Medium     Persistent depressive disorder 11/15/2019     Priority: Medium     Oppositional defiant disorder 11/15/2019     Priority: Medium     Disruptive mood dysregulation disorder (H) 11/15/2019     Priority: Medium     Suicidal ideation 11/14/2019     Priority: Medium     Homicidal ideation 01/17/2017     Priority: Medium     Psychosis (H) 12/15/2016     Priority: Medium     Mental health problem 11/28/2016     Priority: Medium     Disorder of teeth and supporting structures 04/26/2006     Priority: Medium     Overview:   IMO Update 10/11          Past Medical History:    No past medical history on file.    Past Surgical History:    Past Surgical History:   Procedure Laterality Date     LAPAROSCOPIC APPENDECTOMY N/A 7/24/2022    Procedure: APPENDECTOMY, LAPAROSCOPIC;  Surgeon: Kenny Zambrano DO;  Location: [facility] OR     SCLERAL BUCKLE Right 10/17/2019    Left eye done 03/2020       Family History:    Family History   Problem Relation Age of Onset     Depression Mother      Hemochromatosis Mother      No Known Problems Father      No Known Problems Sister      No Known Problems Brother      No Known Problems Brother      Schizophrenia Maternal Grandmother      Hemochromatosis Maternal Grandmother       Suicide Cousin 13        dead     Hemochromatosis Other        Social History:  Marital Status:  Single [1]  Social History     Tobacco Use     Smoking status: Never     Smokeless tobacco: Never   Substance Use Topics     Alcohol use: Yes     Comment: social     Drug use: Never        Medications:    acetaminophen (TYLENOL) 325 MG tablet  ibuprofen (ADVIL/MOTRIN) 800 MG tablet          Review of Systems   Constitutional: Positive for fever. Negative for chills.   HENT: Positive for sore throat. Negative for ear pain.    Respiratory: Positive for cough. Negative for shortness of breath.    All other systems reviewed and are negative.      Physical Exam   BP: 158/86  Pulse: 94  Temp: 98.2  F (36.8  C)  Resp: 16  SpO2: 98 %      Physical Exam  Constitutional:       General: He is not in acute distress.     Appearance: He is ill-appearing (Mildly).   HENT:      Head:      Comments: No frontal or maxillary sinus tenderness.     Right Ear: Tympanic membrane and ear canal normal.      Left Ear: Tympanic membrane and ear canal normal.      Nose: Congestion present. No rhinorrhea.      Mouth/Throat:      Mouth: Mucous membranes are moist.      Pharynx: No oropharyngeal exudate or posterior oropharyngeal erythema.      Comments: Uvula is midline.  No submandibular edema.  Eyes:      Conjunctiva/sclera: Conjunctivae normal.   Cardiovascular:      Rate and Rhythm: Normal rate and regular rhythm.      Heart sounds: No murmur heard.     No friction rub. No gallop.   Pulmonary:      Effort: Pulmonary effort is normal.      Breath sounds: No wheezing, rhonchi or rales.   Skin:     General: Skin is warm and dry.   Neurological:      Mental Status: He is alert.         ED Course                 Procedures             Critical Care time:               Results for orders placed or performed during the hospital encounter of 04/22/23 (from the past 24 hour(s))   Group A Streptococcus PCR Throat Swab    Specimen: Throat; Swab   Result  Value Ref Range    Group A strep by PCR Not Detected Not Detected    Narrative    The Xpert Xpress Strep A test, performed on the [employer], is a rapid, qualitative in vitro diagnostic test for the detection of Streptococcus pyogenes (Group A ß-hemolytic Streptococcus, Strep A) in throat swab specimens from patients with signs and symptoms of pharyngitis. The Xpert Xpress Strep A test can be used as an aid in the diagnosis of Group A Streptococcal pharyngitis. The assay is not intended to monitor treatment for Group A Streptococcus infections. The Xpert Xpress Strep A test utilizes an automated real-time polymerase chain reaction (PCR) to detect Streptococcus pyogenes DNA.       Medications - No data to display    Assessments & Plan (with Medical Decision Making)     History of physical exam most consistent with upper respiratory infection, likely viral in nature.  Strep test is negative.  COVID, influenza, RSV tests are pending.  Exam does not support PTA, Curt's angina.  Plan is continued symptomatic management Tylenol, ibuprofen, hydration and rest.  If symptoms persist or worsen return to urgent care.    I have reviewed the nursing notes.    I have reviewed the findings, diagnosis, plan and need for follow up with the patient.          Medical Decision Making  The patient's presentation was of .    The patient's evaluation involved:      The patient's management necessitated         New Prescriptions    No medications on file       Final diagnoses:   Viral upper respiratory tract infection       4/22/2023   [facility] EMERGENCY DEPARTMENT

## 2023-04-22 NOTE — ED TRIAGE NOTES
Pt presents with c/o a cough, fever, and sore throat that began yesterday, fever of 101 this morning.

## 2023-04-22 NOTE — DISCHARGE INSTRUCTIONS
Strep test is negative.  COVID, influenza, RSV test are pending.  Tylenol and ibuprofen as needed for fevers and discomfort.  If symptoms persist or worsen return to urgent care.

## 2024-02-17 ENCOUNTER — HEALTH MAINTENANCE LETTER (OUTPATIENT)
Age: 24
End: 2024-02-17

## 2024-03-08 NOTE — PROGRESS NOTES
"  Assessment & Plan     Moderate episode of recurrent major depressive disorder (H) / Anxiety  Mood is worsening. No SI. No h/o micki. SI safety plan in place (presenting to [facility] ER)  Reviewed last discharge which has dx of MDD. Has trialed prozac w/o good results, will trial effexor d/t issues with anxiety and depression   - Adult Mental [facility]  Referral; Future - Analy   - venlafaxine (EFFEXOR XR) 37.5 MG 24 hr capsule; Take 1 capsule (37.5 mg) by mouth daily for 14 days, THEN 2 capsules (75 mg) daily for 14 days.  - Adult Mental [facility]  Referral; Future - therapy     - consideration for adjunct with Abilify       See Patient Instructions    Return in about 4 weeks (around 4/8/2024) for mood.    Ana Conti is a 23 year old, presenting for the following health issues:  Abnormal Mood Symptoms    HPI       Abnormal Mood Symptoms  Onset/Duration: f/u  Description:   Depression (if yes, do PHQ-9): YES  Anxiety (if yes, do ADRY-7): YES  Accompanying Signs & Symptoms:  Still participating in activities that you used to enjoy: YES- sometimes  Fatigue: YES  Irritability: YES  Difficulty concentrating: YES  Changes in appetite: YES  Problems with sleep: YES  Heart racing/beating fast: No  Abnormally elevated, expansive, or irritable mood: YES- sometimes  Persistently increased activity or energy: No  Thoughts of hurting yourself or others: YES- himself but not at this moment  History:  Recent stress or major life event: YES- work issues  Prior depression or anxiety: yes  Family history of depression or anxiety: YES  Alcohol/drug use: YES- alcohol 4 times weekly  Difficulty sleeping: YES  Precipitating or alleviating factors: None  Therapies tried and outcome: none      - trialed: prozac (\"was a completely person\", introverted ), seroquel (fatigue), Adderall (did not eat, weight loss)     - working [place] [employer]. Issues with supervisor   - no currently SI  - one night, things became " "too much. SI with gun. Does not remember the evening     - issues with sleep / insomnia at least once per week  - drinking \"was getting out of control\" .  8 to 12 pack every 4 days. Working on weaning himself off alcohol   - no h/o micki    - h/o hospitalizations with last (11/2019)  - Gallito's mother is involved in his life        10/22/2019     3:00 PM   PHQ   PHQ-9 Total Score 2   Q9: Thoughts of better off dead/self-harm past 2 weeks Not at all         10/22/2019     3:00 PM   ADRY-7 SCORE   Total Score 1           Objective    /84   Pulse 104   Temp 98.1  F (36.7  C) (Tympanic)   Resp 18   Ht 1.854 m (6' 1\")   Wt 127.1 kg (280 lb 4.8 oz)   SpO2 98%   BMI 36.98 kg/m    Body mass index is 36.98 kg/m .  Physical Exam  Constitutional:       General: He is not in acute distress.     Appearance: He is not ill-appearing.   Cardiovascular:      Rate and Rhythm: Regular rhythm. Tachycardia present.      Pulses: Normal pulses.      Heart sounds: No murmur heard.  Pulmonary:      Effort: Pulmonary effort is normal. No respiratory distress.      Breath sounds: No wheezing or rales.   Neurological:      Mental Status: He is alert.   Psychiatric:         Mood and Affect: Mood is anxious and depressed.         Thought Content: Thought content does not include suicidal ideation.              Signed Electronically by: Nelly Zapata MD    "

## 2024-03-11 ENCOUNTER — OFFICE VISIT (OUTPATIENT)
Dept: FAMILY MEDICINE | Facility: OTHER | Age: 24
End: 2024-03-11
Attending: FAMILY MEDICINE
Payer: COMMERCIAL

## 2024-03-11 VITALS
RESPIRATION RATE: 18 BRPM | BODY MASS INDEX: 37.15 KG/M2 | SYSTOLIC BLOOD PRESSURE: 122 MMHG | DIASTOLIC BLOOD PRESSURE: 84 MMHG | HEART RATE: 104 BPM | TEMPERATURE: 98.1 F | WEIGHT: 280.3 LBS | HEIGHT: 73 IN | OXYGEN SATURATION: 98 %

## 2024-03-11 DIAGNOSIS — F33.1 MODERATE EPISODE OF RECURRENT MAJOR DEPRESSIVE DISORDER (H): Primary | ICD-10-CM

## 2024-03-11 DIAGNOSIS — F41.9 ANXIETY: ICD-10-CM

## 2024-03-11 PROCEDURE — 99214 OFFICE O/P EST MOD 30 MIN: CPT | Performed by: FAMILY MEDICINE

## 2024-03-11 RX ORDER — VENLAFAXINE HYDROCHLORIDE 37.5 MG/1
CAPSULE, EXTENDED RELEASE ORAL
Qty: 60 CAPSULE | Refills: 1 | Status: SHIPPED | OUTPATIENT
Start: 2024-03-11 | End: 2024-04-11

## 2024-03-11 ASSESSMENT — PAIN SCALES - GENERAL: PAINLEVEL: NO PAIN (0)

## 2024-03-11 NOTE — PATIENT INSTRUCTIONS
We put in a referral to Analy for mental health medications     Start effexor 37.5mg every day for two weeks, then increase 75mg every day       Psychologists/ Counselors                        Stephen Quan          ...            ..[phone]  [facility]                    ..  [phone]  [facility]                 [phone]  [facility] (kids)       .         [phone]  [employer] [employer](teens)                ..[phone]  [facility]                    [phone]  [facility]                   [phone]  [facility]           ...      .. [phone]  [facility]                ...[phone]  [facility]     ...          ...[phone]  [facility]               [phone]   [facility]..            .[phone]  [facility]                       [phone]  Critical access hospital               .    [phone]  [facility]                ..[phone]  [facility]     .      . ..[phone]  [facility].....................................................................................[phone]     [facility]              .   [phone]  [facility]                   [phone]              [facility]              .   [phone]  [facility]              .  ...[phone]  [facility]              .   ..[phone]  [facility]           ...      .. [phone]  [facility]              . ..[phone]  [facility]              .    ..[phone]  [facility]              .     .. [phone]  [facility]                 ... [phone]  [facility]                         .[phone]  [facility]     .      . ..[phone]  [facility]  [facility]...............................................................[phone]  [facility].....................................................................................[phone]              [facility]              ..  [phone]                 [facility]                                                                   [phone]  [facility]             . ... ..[phone]  Lee[facility]              .     ..[phone]  [facility]        .      . [phone]  [facility]           .[phone]  [facility]         .    ..[phone]  [facility]             . ..  ..  [phone]  [facility]            [phone]  [facility]              ..[phone]  [facility]                     .[phone]  [facility]........................................................................[phone]  [facility]...........................................................[phone]  [facility]...................................................................[phone]  [facility].......................................[phone]  [facility]......................................................................[phone]  [facility]....................................................................................[phone]       [facility]    ...     ...[phone]     [facility]            [phone]                                                  Ryan Rubio                ..  .  [phone]  [facility]         .     .  [phone]  [facility] KARSON Keita              . [phone]  [facility]  ..        [phone]  [facility]              .    [phone]    [facility]               ...  .  [phone]  [facility]             . [phone]  [facility]            [phone]  [facility]     .      . ..[phone]               [facility]....................................................[phone]          *Facilities in bold italics indicate medication management  Services are offered.     Crisis support    If you or someone you know is struggling or in crisis, help is available. Call or text 910 or chat tu.nr.org    The volunteer Crisis Counselor will help you move from a 'hot moment to a cool moment'

## 2024-04-08 NOTE — PROGRESS NOTES
Assessment & Plan     Moderate episode of recurrent major depressive disorder (H) / Anxiety  Established with [facility] for therapy  establishing with Analy on 6/26/2024  Doing well with current dosage of effexor   - c/w venlafaxine (EFFEXOR XR) 75 MG 24 hr capsule; Take 1 capsule (75 mg) by mouth daily    # Tdap updated today    See Patient Instructions    Return in about 3 months (around 7/12/2024) for Physical Exam, Lab Work.    Ana Conti is a 23 year old, presenting for the following health issues:  Anxiety and Depression        4/12/2024     8:16 AM   Additional Questions   Roomed by Amanda Zhou     HPI     Depression and Anxiety   How are you doing with your depression since your last visit? Improved   How are you doing with your anxiety since your last visit?  Improved   Are you having other symptoms that might be associated with depression or anxiety? No  Have you had a significant life event? No   Do you have any concerns with your use of alcohol or other drugs? Yes:  6 pack of beer a week      - has a gun in the house but no ammo. Mother has his other gun   - started effexor, noticing a difference. Increased energy, easier to get along with  - establishing with Analy on 6/26/2024  - follows with Catr[facility] Bishop for therapy with last visit yesterday  - no SI   - notices when he does not take his meds      Answers submitted by the patient for this visit:  Patient Health Questionnaire (Submitted on 4/12/2024)  If you checked off any problems, how difficult have these problems made it for you to do your work, take care of things at home, or get along with other people?: Somewhat difficult  PHQ9 TOTAL SCORE: 6  ADRY-7 (Submitted on 4/12/2024)  ADRY 7 TOTAL SCORE: 4    Social History     Tobacco Use    Smoking status: Never    Smokeless tobacco: Never   Substance Use Topics    Alcohol use: Yes     Comment: social    Drug use: Never         10/22/2019     3:00 PM 4/12/2024     8:15 AM   PHQ   PHQ-9  "Total Score 2 6   Q9: Thoughts of better off dead/self-harm past 2 weeks Not at all Not at all         10/22/2019     3:00 PM 4/12/2024     8:16 AM   ADRY-7 SCORE   Total Score  4 (minimal anxiety)   Total Score 1 4         4/12/2024     8:15 AM   Last PHQ-9   1.  Little interest or pleasure in doing things 1   2.  Feeling down, depressed, or hopeless 1   3.  Trouble falling or staying asleep, or sleeping too much 2   4.  Feeling tired or having little energy 1   5.  Poor appetite or overeating 1   6.  Feeling bad about yourself 0   7.  Trouble concentrating 0   8.  Moving slowly or restless 0   Q9: Thoughts of better off dead/self-harm past 2 weeks 0   PHQ-9 Total Score 6         4/12/2024     8:16 AM   ADRY-7    1. Feeling nervous, anxious, or on edge 1   2. Not being able to stop or control worrying 0   3. Worrying too much about different things 1   4. Trouble relaxing 1   5. Being so restless that it is hard to sit still 0   6. Becoming easily annoyed or irritable 1   7. Feeling afraid, as if something awful might happen 0   ADRY-7 Total Score 4   If you checked any problems, how difficult have they made it for you to do your work, take care of things at home, or get along with other people? Somewhat difficult         Review of Systems  Constitutional, HEENT, cardiovascular, pulmonary, gi and gu systems are negative, except as otherwise noted.      Objective    /78 (BP Location: Left arm, Patient Position: Sitting, Cuff Size: Adult Large)   Pulse 91   Temp 98.4  F (36.9  C) (Tympanic)   Resp 16   Ht 1.854 m (6' 1\")   Wt 125.6 kg (277 lb)   SpO2 98%   BMI 36.55 kg/m    Body mass index is 36.55 kg/m .  Physical Exam  Constitutional:       General: He is not in acute distress.     Appearance: He is not ill-appearing.   Cardiovascular:      Rate and Rhythm: Normal rate and regular rhythm.      Pulses: Normal pulses.      Heart sounds: No murmur heard.  Pulmonary:      Effort: Pulmonary effort is normal. " No respiratory distress.      Breath sounds: No wheezing or rales.   Neurological:      Mental Status: He is alert.   Psychiatric:         Mood and Affect: Mood normal.              Signed Electronically by: Nelly Zapata MD

## 2024-04-11 ENCOUNTER — OFFICE VISIT (OUTPATIENT)
Dept: PSYCHOLOGY | Facility: OTHER | Age: 24
End: 2024-04-11
Attending: FAMILY MEDICINE
Payer: COMMERCIAL

## 2024-04-11 DIAGNOSIS — F41.9 ANXIETY: ICD-10-CM

## 2024-04-11 DIAGNOSIS — F33.1 MODERATE EPISODE OF RECURRENT MAJOR DEPRESSIVE DISORDER (H): ICD-10-CM

## 2024-04-11 PROCEDURE — 90837 PSYTX W PT 60 MINUTES: CPT | Performed by: COUNSELOR

## 2024-04-11 RX ORDER — VENLAFAXINE HYDROCHLORIDE 75 MG/1
CAPSULE, EXTENDED RELEASE ORAL
COMMUNITY
Start: 2024-03-11 | End: 2024-04-12

## 2024-04-12 ENCOUNTER — OFFICE VISIT (OUTPATIENT)
Dept: FAMILY MEDICINE | Facility: OTHER | Age: 24
End: 2024-04-12
Attending: FAMILY MEDICINE
Payer: COMMERCIAL

## 2024-04-12 VITALS
DIASTOLIC BLOOD PRESSURE: 78 MMHG | WEIGHT: 277 LBS | OXYGEN SATURATION: 98 % | HEIGHT: 73 IN | TEMPERATURE: 98.4 F | RESPIRATION RATE: 16 BRPM | BODY MASS INDEX: 36.71 KG/M2 | HEART RATE: 91 BPM | SYSTOLIC BLOOD PRESSURE: 118 MMHG

## 2024-04-12 DIAGNOSIS — F33.1 MODERATE EPISODE OF RECURRENT MAJOR DEPRESSIVE DISORDER (H): Primary | ICD-10-CM

## 2024-04-12 DIAGNOSIS — F41.9 ANXIETY: ICD-10-CM

## 2024-04-12 PROCEDURE — 99213 OFFICE O/P EST LOW 20 MIN: CPT | Mod: 25 | Performed by: FAMILY MEDICINE

## 2024-04-12 PROCEDURE — 90715 TDAP VACCINE 7 YRS/> IM: CPT | Performed by: FAMILY MEDICINE

## 2024-04-12 PROCEDURE — 90471 IMMUNIZATION ADMIN: CPT | Performed by: FAMILY MEDICINE

## 2024-04-12 RX ORDER — VENLAFAXINE HYDROCHLORIDE 75 MG/1
75 CAPSULE, EXTENDED RELEASE ORAL DAILY
Qty: 90 CAPSULE | Refills: 1 | Status: SHIPPED
Start: 2024-04-12 | End: 2024-05-02

## 2024-04-12 ASSESSMENT — ANXIETY QUESTIONNAIRES
1. FEELING NERVOUS, ANXIOUS, OR ON EDGE: SEVERAL DAYS
2. NOT BEING ABLE TO STOP OR CONTROL WORRYING: NOT AT ALL
IF YOU CHECKED OFF ANY PROBLEMS ON THIS QUESTIONNAIRE, HOW DIFFICULT HAVE THESE PROBLEMS MADE IT FOR YOU TO DO YOUR WORK, TAKE CARE OF THINGS AT HOME, OR GET ALONG WITH OTHER PEOPLE: SOMEWHAT DIFFICULT
GAD7 TOTAL SCORE: 4
3. WORRYING TOO MUCH ABOUT DIFFERENT THINGS: SEVERAL DAYS
6. BECOMING EASILY ANNOYED OR IRRITABLE: SEVERAL DAYS
4. TROUBLE RELAXING: SEVERAL DAYS
GAD7 TOTAL SCORE: 4
8. IF YOU CHECKED OFF ANY PROBLEMS, HOW DIFFICULT HAVE THESE MADE IT FOR YOU TO DO YOUR WORK, TAKE CARE OF THINGS AT HOME, OR GET ALONG WITH OTHER PEOPLE?: SOMEWHAT DIFFICULT
GAD7 TOTAL SCORE: 4
7. FEELING AFRAID AS IF SOMETHING AWFUL MIGHT HAPPEN: NOT AT ALL
5. BEING SO RESTLESS THAT IT IS HARD TO SIT STILL: NOT AT ALL
7. FEELING AFRAID AS IF SOMETHING AWFUL MIGHT HAPPEN: NOT AT ALL

## 2024-04-12 ASSESSMENT — PATIENT HEALTH QUESTIONNAIRE - PHQ9
SUM OF ALL RESPONSES TO PHQ QUESTIONS 1-9: 6
10. IF YOU CHECKED OFF ANY PROBLEMS, HOW DIFFICULT HAVE THESE PROBLEMS MADE IT FOR YOU TO DO YOUR WORK, TAKE CARE OF THINGS AT HOME, OR GET ALONG WITH OTHER PEOPLE: SOMEWHAT DIFFICULT
SUM OF ALL RESPONSES TO PHQ QUESTIONS 1-9: 6

## 2024-04-12 ASSESSMENT — PAIN SCALES - GENERAL: PAINLEVEL: NO PAIN (0)

## 2024-04-12 NOTE — LETTER
My Depression Action Plan  Name: Gallito Doshi   Date of Birth 2000  Date: 4/11/2024    My doctor: Nelly Zapata   My clinic: [facility] [place]  [place] [address]  [phone]            GREEN    ZONE   Good Control    What it looks like:   Things are going generally well. You have normal ups and downs. You may even feel depressed from time to time, but bad moods usually last less than a day.   What you need to do:  Continue to care for yourself (see self care plan)  Check your depression survival kit and update it as needed  Follow your physician s recommendations including any medication.  Do not stop taking medication unless you consult with your physician first.             YELLOW         ZONE Getting Worse    What it looks like:   Depression is starting to interfere with your life.   It may be hard to get out of bed; you may be starting to isolate yourself from others.  Symptoms of depression are starting to last most all day and this has happened for several days.   You may have suicidal thoughts but they are not constant.   What you need to do:     Call your care team. Your response to treatment will improve if you keep your care team informed of your progress. Yellow periods are signs an adjustment may need to be made.     Continue your self-care.  Just get dressed and ready for the day.  Don't give yourself time to talk yourself out of it.    Talk to someone in your support network.    Open up your Depression Self-Care Plan/Wellness Kit.             RED    ZONE Medical Alert - Get Help    What it looks like:   Depression is seriously interfering with your life.   You may experience these or other symptoms: You can t get out of bed most days, can t work or engage in other necessary activities, you have trouble taking care of basic hygiene, or basic responsibilities, thoughts of suicide or death that will not go away, self-injurious behavior.      What you need to do:  Call your care team and request a same-day appointment. If they are not available (weekends or after hours) call your local crisis line, emergency room or 911.          Depression Self-Care Plan / Wellness Kit    Many people find that medication and therapy are helpful treatments for managing depression. In addition, making small changes to your everyday life can help to boost your mood and improve your wellbeing. Below are some tips for you to consider. Be sure to talk with your medical provider and/or behavioral health consultant if your symptoms are worsening or not improving.     Sleep   Sleep hygiene  means all of the habits that support good, restful sleep. It includes maintaining a consistent bedtime and wake time, using your bedroom only for sleeping or sex, and keeping the bedroom dark and free of distractions like a computer, smartphone, or television.     Develop a Healthy Routine  Maintain good hygiene. Get out of bed in the morning, make your bed, brush your teeth, take a shower, and get dressed. Don t spend too much time viewing media that makes you feel stressed. Find time to relax each day.    Exercise  Get some form of exercise every day. This will help reduce pain and release endorphins, the  feel good  chemicals in your brain. It can be as simple as just going for a walk or doing some gardening, anything that will get you moving.      Diet  Strive to eat healthy foods, including fruits and vegetables. Drink plenty of water. Avoid excessive sugar, caffeine, alcohol, and other mood-altering substances.     Stay Connected with Others  Stay in touch with friends and family members.    Manage Your Mood  Try deep breathing, massage therapy, biofeedback, or meditation. Take part in fun activities when you can. Try to find something to smile about each day.     Psychotherapy  Be open to working with a therapist if your provider recommends it.     Medication  Be sure to take your  medication as prescribed. Most anti-depressants need to be taken every day. It usually takes several weeks for medications to work. Not all medicines work for all people. It is important to follow-up with your provider to make sure you have a treatment plan that is working for you. Do not stop your medication abruptly without first discussing it with your provider.    Crisis Resources   These hotlines are for both adults and children. They and are open 24 hours a day, 7 days a week unless noted otherwise.    National Suicide Prevention Lifeline   988 or 6-107-849-KKID (9479)    Crisis Text Line    www.crisistextline.org  Text HOME to 745953 from anywhere in the [place], anytime, about any type of crisis. A live, trained crisis counselor will receive the text and respond quickly.    [facility] Lifeline for LGBTQ Youth  A national crisis intervention and suicide lifeline for LGBTQ youth under 25. Provides a safe place to talk without judgement. Call 1-330.873.1116; text START to 534101 or visit www.theLupatechvorproject.org to talk to a trained counselor.    For Critical access hospital crisis numbers, visit the [facility] website at:  https://[place].gov/dhs/people-we-serve/adults/health-care/mental-health/resources/crisis-contacts.jsp

## 2024-04-14 DIAGNOSIS — F33.1 MODERATE EPISODE OF RECURRENT MAJOR DEPRESSIVE DISORDER (H): ICD-10-CM

## 2024-04-14 DIAGNOSIS — F41.9 ANXIETY: ICD-10-CM

## 2024-04-15 RX ORDER — VENLAFAXINE HYDROCHLORIDE 37.5 MG/1
CAPSULE, EXTENDED RELEASE ORAL
Qty: 14 CAPSULE | Refills: 0 | OUTPATIENT
Start: 2024-04-15

## 2024-04-15 NOTE — TELEPHONE ENCOUNTER
Effexor      Last Written Prescription Date:  4/12/24  Last Fill Quantity: 90,   # refills: 1  Last Office Visit: 4/12/24  Future Office visit:    Next 5 appointments (look out 90 days)      Apr 18, 2024  9:30 AM  (Arrive by 9:15 AM)  Return Visit with NAN Barrios  [facility] ([facility] ) [address]  [place] [address]  [phone]     May 02, 2024 10:00 AM  (Arrive by 9:45 AM)  Return Visit with NAN Barrios  [facility] ([facility] ) [address]  [place] [address]  [phone]     May 15, 2024  8:00 AM  (Arrive by 7:45 AM)  Return Visit with NAN Barrios  [facility] ([facility] ) [address]  [place] [address]  [phone]     May 29, 2024  9:30 AM  (Arrive by 9:15 AM)  Return Visit with NAN Barrios  [facility] ([facility] ) [address]  [place] [address]  [phone]             Routing refill request to provider for review/approval because:

## 2024-04-17 NOTE — PROGRESS NOTES
Progress Note    Client Name: Gallito Doshi  Date: April 17, 2024         Service Type: Consult Note      Session Start Time: 0730  Session End Time: 0825      Session Length: 55         Attendees: Client attended alone      PHQ-9 :       10/22/2019     3:00 PM 4/12/2024     8:15 AM   PHQ-9 SCORE   PHQ-9 Total Score MyChart  6 (Mild depression)   PHQ-9 Total Score 2 6      ADRY-7 :        10/22/2019     3:00 PM 4/12/2024     8:16 AM   ADRY-7 SCORE   Total Score  4 (minimal anxiety)   Total Score 1 4     Gallito Doshi presented as O x 4,coherent,relevant and moderately distressed. His memory.was intact .The pt exhibited no overt signs of psychotic processes. There was no report of symptoms indicating derailment of thought  such   A/V hallucinations.The pt denied S/H ideations and as a consequence  there was immediate no need  for a  safety plan.The patient's insight and judgement were within a range acceptable for safety.  His  fund of information appears to be within a normal range. . .    Gallito was seen today on consult. 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 He has been running into difficulty with one co-worker.which has caused him distress. He has dealt with  depression since his teenage years. He has agreed to be seen at least 6 x.                 DATA      Progress Since Last Session (Related to Symptoms / Goals / Homework):   Symptoms:  New client reporting depression    Homework:  New client     Current / Ongoing Stressors and Concerns:   Depression and anxiety     Treatment Objective(s) Addressed in This Session:   Improved mood     Intervention:   Problem clarification     Response to Interventions:   Agreeing to being seen 6 x     ASSESSMENT: Current Emotional / Mental Status (status of significant symptoms):   Risk status (Self / Other harm or suicidal ideation)   Client denies current fears or  concerns for personal safety.   Client denies current or recent suicidal ideation or behaviors.   Client denies current or recent homicidal ideation or behaviors.   Client denies current or recent self injurious behavior or ideation.   Client denies other safety concerns.   Recommended that patient call 911 or go to the local ED should there be a change in any of these risk factors.     Appearance:   Appropriate    Eye Contact:   Good    Psychomotor Behavior: Retarded (Slowed)    Attitude:   Cooperative    Orientation:   All   Speech    Rate / Production: Normal     Volume:  Normal    Mood:    Anxious  Depressed  Dysphoric   Affect:    Mood congruent   Thought Content:  Clear    Thought Form:  Coherent  Logical    Insight:    Fair        Medication Compliance:   Yes     Changes in Health Issues:   None reported     Chemical Use Review:   Substance Use: Chemical use reviewed, no active concerns identified      Tobacco Use: No current tobacco use.       Collateral Reports Completed:   Not Applicable    PLAN: (Client Tasks / Therapist Tasks / Other)    Complete a NAN [place]

## 2024-05-02 ENCOUNTER — OFFICE VISIT (OUTPATIENT)
Dept: PSYCHOLOGY | Facility: OTHER | Age: 24
End: 2024-05-02
Attending: COUNSELOR
Payer: COMMERCIAL

## 2024-05-02 DIAGNOSIS — F32.A ANXIETY AND DEPRESSION: Primary | ICD-10-CM

## 2024-05-02 DIAGNOSIS — F41.9 ANXIETY AND DEPRESSION: Primary | ICD-10-CM

## 2024-05-02 DIAGNOSIS — F33.1 MODERATE EPISODE OF RECURRENT MAJOR DEPRESSIVE DISORDER (H): ICD-10-CM

## 2024-05-02 DIAGNOSIS — F41.9 ANXIETY: ICD-10-CM

## 2024-05-02 PROCEDURE — 90837 PSYTX W PT 60 MINUTES: CPT | Performed by: COUNSELOR

## 2024-05-02 RX ORDER — VENLAFAXINE HYDROCHLORIDE 75 MG/1
75 CAPSULE, EXTENDED RELEASE ORAL DAILY
Qty: 90 CAPSULE | Refills: 1 | Status: SHIPPED | OUTPATIENT
Start: 2024-05-02 | End: 2024-05-29

## 2024-05-06 NOTE — PROGRESS NOTES
Progress Note    Client Name: Gallito Doshi  Date: May 2, 2024         Service Type: Individual      Session Start Time: 1000  Session End Time: 1055      Session Length: 55         Attendees: Client attended alone      PHQ-9 :       10/22/2019     3:00 PM 4/12/2024     8:15 AM   PHQ-9 SCORE   PHQ-9 Total Score MyChart  6 (Mild depression)   PHQ-9 Total Score 2 6      ADRY-7 :        10/22/2019     3:00 PM 4/12/2024     8:16 AM   ADRY-7 SCORE   Total Score  4 (minimal anxiety)   Total Score 1 4           DATA    Gallito Doshi presented as O x 4,coherent,relevant and pleasant.His memory.was intact .The pt exhibited no overt signs of psychotic processes. There was no report of symptoms indicating derailment of thought  such   A/V hallucinations.The pt denied S/H ideations and as a consequence  there was immediate no need  for a  safety plan.The patient's insight and judgement were within a range acceptable for safety.   MILAGROS Conti's fund of information appears to be within a normal range.      The pt's has resumed therapy after a significant absence. He attributed his absence to finances. He has resolved the issue somewhat now. He said that he is feeling somewhat better due in great part to a medication adjustment. The pt reported that his family is loaded for depression . His mother is his go to person to discuss his issues.    Gallito reports that he likes his job. The pt lives  close to his work. He looking forward  to getting new  and more agreeable supervision in July. The pt was encouraged to increase his range of activities.        Progress Since Last Session (Related to Symptoms / Goals / Homework):   Symptoms: Improving Appearing brighter and in better spirits than when last seen    Homework: Completed in session     Current / Ongoing Stressors and Concerns:   Limited social interaction.limited amount of activity     Treatment Objective(s) Addressed in This  Session:     Improved mood     Intervention:   Psychoeducation     Response to Interventions:   Receptive     ASSESSMENT: Current Emotional / Mental Status (status of significant symptoms):   Risk status (Self / Other harm or suicidal ideation)   Client denies current fears or concerns for personal safety.   Client denies current or recent suicidal ideation or behaviors.   Client denies current or recent homicidal ideation or behaviors.   Client denies current or recent self injurious behavior or ideation.   Client denies other safety concerns.   Recommended that patient call 911 or go to the local ED should there be a change in any of these risk factors.     Appearance:   Appropriate    Eye Contact:   Good    Psychomotor Behavior: Normal    Attitude:   Cooperative    Orientation:   All   Speech    Rate / Production: Normal     Volume:  Normal    Mood:    Anxious  Dysphoric   Affect:    Mood congruent   Thought Content:  Clear    Thought Form:  Coherent  Logical    Insight:    Fair         Medication Compliance:   Yes     Changes in Health Issues:   None reported     Chemical Use Review:   Substance Use: Chemical use reviewed, no active concerns identified      Tobacco Use: No current tobacco use.       Collateral Reports Completed:   Not Applicable    PLAN: (Client Tasks / Therapist Tasks / Other)  Complete NAN [place]  The author of this note documented a reason for not sharing it with the patient.

## 2024-05-14 ASSESSMENT — ANXIETY QUESTIONNAIRES
1. FEELING NERVOUS, ANXIOUS, OR ON EDGE: SEVERAL DAYS
5. BEING SO RESTLESS THAT IT IS HARD TO SIT STILL: NOT AT ALL
GAD7 TOTAL SCORE: 2
6. BECOMING EASILY ANNOYED OR IRRITABLE: SEVERAL DAYS
2. NOT BEING ABLE TO STOP OR CONTROL WORRYING: NOT AT ALL
GAD7 TOTAL SCORE: 2
7. FEELING AFRAID AS IF SOMETHING AWFUL MIGHT HAPPEN: NOT AT ALL
3. WORRYING TOO MUCH ABOUT DIFFERENT THINGS: NOT AT ALL

## 2024-05-14 ASSESSMENT — PATIENT HEALTH QUESTIONNAIRE - PHQ9
5. POOR APPETITE OR OVEREATING: NOT AT ALL
SUM OF ALL RESPONSES TO PHQ QUESTIONS 1-9: 6

## 2024-05-15 ENCOUNTER — OFFICE VISIT (OUTPATIENT)
Dept: PSYCHOLOGY | Facility: OTHER | Age: 24
End: 2024-05-15
Attending: COUNSELOR
Payer: COMMERCIAL

## 2024-05-15 DIAGNOSIS — F32.A ANXIETY AND DEPRESSION: Primary | ICD-10-CM

## 2024-05-15 DIAGNOSIS — F41.9 ANXIETY AND DEPRESSION: Primary | ICD-10-CM

## 2024-05-15 PROCEDURE — 90832 PSYTX W PT 30 MINUTES: CPT | Performed by: COUNSELOR

## 2024-05-16 NOTE — PROGRESS NOTES
Progress Note    Client Name: Gallito Doshi  Date: May 16, 2024         Service Type: Individual      Session Start Time: 0815  Session End Time: 0850      Session Length: 35         Attendees: Client attended alone                 PHQ-9 :       10/22/2019     3:00 PM 4/12/2024     8:15 AM 5/14/2024     8:56 AM   PHQ-9 SCORE   PHQ-9 Total Score MyChart  6 (Mild depression)    PHQ-9 Total Score 2 6 6      ADRY-7 :        10/22/2019     3:00 PM 4/12/2024     8:16 AM 5/14/2024     8:56 AM   ADRY-7 SCORE   Total Score  4 (minimal anxiety)    Total Score 1 4 2           DATA  Gallito Doshi presented as O x 4,coherent,relevant and pleasant. His memory.was intact .The pt exhibited no overt signs of psychotic processes. There was no report of symptoms indicating derailment of thought  such   A/V hallucinations.The pt denied S/H ideations and as a consequence  there was immediate no need for an urgent  safety plan.The pt has  agreed if such were to recur. He will contact me ,[phone],or  the ED.He reported that he hasn't had any suicidal thoughts recent. The patient's insight and judgement were within a range acceptable for safety.  His fund of information appears to be within a normal range.     The theme of this session was his well-being. He is pretty happy with his present job. 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            Progress Since Last Session (Related to Symptoms / Goals / Homework):   Symptoms: Improving      Homework: Completed in session     Current / Ongoing Stressors and Concerns:   Thinking about a new job with a juvenile facility     Treatment Objective(s) Addressed in This Session:     Alleviate anxiety       Intervention:                 Psychoeducation          ASSESSMENT: Current Emotional / Mental Status (status of significant symptoms):   Risk status (Self / Other harm or  suicidal ideation)   Client denies current fears or concerns for personal safety.   Client denies current or recent suicidal ideation or behaviors.   Client denies current or recent homicidal ideation or behaviors.   Client denies current or recent self injurious behavior or ideation.   Client denies other safety concerns.   Recommended that patient call 911 or go to the local ED should there be a change in any of these risk factors.     Appearance:   Appropriate    Eye Contact:   Good    Psychomotor Behavior: Normal    Attitude:   Cooperative    Orientation:   All   Speech    Rate / Production: Normal     Volume:  Normal    Mood:    Anxious  Dysphoric   Affect:    Mood congruent   Thought Content:  Clear    Thought Form:  Coherent  Logical    Insight:    Fair         Medication Compliance:   Yes     Changes in Health Issues:   None reported     Chemical Use Review:   Substance Use: Chemical use reviewed, no active concerns identified      Tobacco Use: No current tobacco use.       Collateral Reports Completed:   Not Applicable    PLAN: (Client Tasks / Therapist Tasks / Other)  Complete a DA    NAN Barrios

## 2024-05-29 ENCOUNTER — OFFICE VISIT (OUTPATIENT)
Dept: PSYCHOLOGY | Facility: OTHER | Age: 24
End: 2024-05-29
Attending: COUNSELOR
Payer: COMMERCIAL

## 2024-05-29 ENCOUNTER — OFFICE VISIT (OUTPATIENT)
Dept: PSYCHIATRY | Facility: OTHER | Age: 24
End: 2024-05-29
Payer: COMMERCIAL

## 2024-05-29 VITALS
BODY MASS INDEX: 34.46 KG/M2 | DIASTOLIC BLOOD PRESSURE: 74 MMHG | HEART RATE: 98 BPM | HEIGHT: 73 IN | OXYGEN SATURATION: 98 % | SYSTOLIC BLOOD PRESSURE: 116 MMHG | TEMPERATURE: 97.5 F | WEIGHT: 260 LBS

## 2024-05-29 DIAGNOSIS — F41.9 ANXIETY: ICD-10-CM

## 2024-05-29 DIAGNOSIS — F33.1 MODERATE EPISODE OF RECURRENT MAJOR DEPRESSIVE DISORDER (H): ICD-10-CM

## 2024-05-29 DIAGNOSIS — F41.9 ANXIETY AND DEPRESSION: Primary | ICD-10-CM

## 2024-05-29 DIAGNOSIS — F32.A ANXIETY AND DEPRESSION: Primary | ICD-10-CM

## 2024-05-29 PROBLEM — F91.3 OPPOSITIONAL DEFIANT DISORDER: Status: RESOLVED | Noted: 2019-11-15 | Resolved: 2024-05-29

## 2024-05-29 PROBLEM — F34.81 DISRUPTIVE MOOD DYSREGULATION DISORDER (H): Status: RESOLVED | Noted: 2019-11-15 | Resolved: 2024-05-29

## 2024-05-29 PROCEDURE — 99205 OFFICE O/P NEW HI 60 MIN: CPT

## 2024-05-29 PROCEDURE — 90791 PSYCH DIAGNOSTIC EVALUATION: CPT | Performed by: COUNSELOR

## 2024-05-29 RX ORDER — VENLAFAXINE HYDROCHLORIDE 150 MG/1
150 CAPSULE, EXTENDED RELEASE ORAL DAILY
Qty: 90 CAPSULE | Refills: 1 | Status: SHIPPED | OUTPATIENT
Start: 2024-05-29 | End: 2024-09-30

## 2024-05-29 ASSESSMENT — ANXIETY QUESTIONNAIRES
GAD7 TOTAL SCORE: 4
8. IF YOU CHECKED OFF ANY PROBLEMS, HOW DIFFICULT HAVE THESE MADE IT FOR YOU TO DO YOUR WORK, TAKE CARE OF THINGS AT HOME, OR GET ALONG WITH OTHER PEOPLE?: SOMEWHAT DIFFICULT
7. FEELING AFRAID AS IF SOMETHING AWFUL MIGHT HAPPEN: NOT AT ALL
IF YOU CHECKED OFF ANY PROBLEMS ON THIS QUESTIONNAIRE, HOW DIFFICULT HAVE THESE PROBLEMS MADE IT FOR YOU TO DO YOUR WORK, TAKE CARE OF THINGS AT HOME, OR GET ALONG WITH OTHER PEOPLE: SOMEWHAT DIFFICULT
GAD7 TOTAL SCORE: 4
4. TROUBLE RELAXING: SEVERAL DAYS
5. BEING SO RESTLESS THAT IT IS HARD TO SIT STILL: NOT AT ALL
7. FEELING AFRAID AS IF SOMETHING AWFUL MIGHT HAPPEN: NOT AT ALL
6. BECOMING EASILY ANNOYED OR IRRITABLE: SEVERAL DAYS
2. NOT BEING ABLE TO STOP OR CONTROL WORRYING: NOT AT ALL
3. WORRYING TOO MUCH ABOUT DIFFERENT THINGS: SEVERAL DAYS
1. FEELING NERVOUS, ANXIOUS, OR ON EDGE: SEVERAL DAYS
GAD7 TOTAL SCORE: 4

## 2024-05-29 ASSESSMENT — PAIN SCALES - GENERAL: PAINLEVEL: NO PAIN (0)

## 2024-05-29 ASSESSMENT — PATIENT HEALTH QUESTIONNAIRE - PHQ9
SUM OF ALL RESPONSES TO PHQ QUESTIONS 1-9: 7
10. IF YOU CHECKED OFF ANY PROBLEMS, HOW DIFFICULT HAVE THESE PROBLEMS MADE IT FOR YOU TO DO YOUR WORK, TAKE CARE OF THINGS AT HOME, OR GET ALONG WITH OTHER PEOPLE: SOMEWHAT DIFFICULT
SUM OF ALL RESPONSES TO PHQ QUESTIONS 1-9: 7

## 2024-05-29 ASSESSMENT — COLUMBIA-SUICIDE SEVERITY RATING SCALE - C-SSRS
6. IN YOUR LIFETIME, HAVE YOU EVER DONE ANYTHING, STARTED TO DO ANYTHING, OR PREPARED TO DO ANYTHING TO END YOUR LIFE?: YES
1. IN THE PAST MONTH, HAVE YOU WISHED YOU WERE DEAD OR WISHED YOU COULD GO TO SLEEP AND NOT WAKE UP?: YES
2. HAVE YOU ACTUALLY HAD ANY THOUGHTS OF KILLING YOURSELF?: NO
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
## 2024-05-29 NOTE — PATIENT INSTRUCTIONS
- Increase Venlafaxine XR to 150 mg daily  - Return to clinic in 1 month  Thank you for allowing Analy Serrano, SORAYA, APRN to participate in your care.  If you have a scheduling or an appointment question please contact our scheduling department at their direct line [phone].   ALL nursing questions or concerns can be directed to the psychiatry nurses at [phone] or [phone]
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
## 2024-07-01 ENCOUNTER — OFFICE VISIT (OUTPATIENT)
Dept: PSYCHOLOGY | Facility: OTHER | Age: 24
End: 2024-07-01
Attending: COUNSELOR
Payer: COMMERCIAL

## 2024-07-01 ENCOUNTER — OFFICE VISIT (OUTPATIENT)
Dept: PSYCHIATRY | Facility: OTHER | Age: 24
End: 2024-07-01
Payer: COMMERCIAL

## 2024-07-01 VITALS
HEIGHT: 73 IN | HEART RATE: 87 BPM | BODY MASS INDEX: 34.46 KG/M2 | TEMPERATURE: 97.1 F | SYSTOLIC BLOOD PRESSURE: 124 MMHG | DIASTOLIC BLOOD PRESSURE: 72 MMHG | WEIGHT: 260 LBS | OXYGEN SATURATION: 98 %

## 2024-07-01 DIAGNOSIS — F41.9 ANXIETY AND DEPRESSION: Primary | ICD-10-CM

## 2024-07-01 DIAGNOSIS — F33.1 MODERATE EPISODE OF RECURRENT MAJOR DEPRESSIVE DISORDER (H): Primary | ICD-10-CM

## 2024-07-01 DIAGNOSIS — F32.A ANXIETY AND DEPRESSION: Primary | ICD-10-CM

## 2024-07-01 PROCEDURE — 99215 OFFICE O/P EST HI 40 MIN: CPT

## 2024-07-01 PROCEDURE — 90834 PSYTX W PT 45 MINUTES: CPT | Performed by: COUNSELOR

## 2024-07-01 ASSESSMENT — ANXIETY QUESTIONNAIRES
5. BEING SO RESTLESS THAT IT IS HARD TO SIT STILL: NOT AT ALL
IF YOU CHECKED OFF ANY PROBLEMS ON THIS QUESTIONNAIRE, HOW DIFFICULT HAVE THESE PROBLEMS MADE IT FOR YOU TO DO YOUR WORK, TAKE CARE OF THINGS AT HOME, OR GET ALONG WITH OTHER PEOPLE: NOT DIFFICULT AT ALL
8. IF YOU CHECKED OFF ANY PROBLEMS, HOW DIFFICULT HAVE THESE MADE IT FOR YOU TO DO YOUR WORK, TAKE CARE OF THINGS AT HOME, OR GET ALONG WITH OTHER PEOPLE?: NOT DIFFICULT AT ALL
6. BECOMING EASILY ANNOYED OR IRRITABLE: NOT AT ALL
4. TROUBLE RELAXING: NOT AT ALL
GAD7 TOTAL SCORE: 1
GAD7 TOTAL SCORE: 1
2. NOT BEING ABLE TO STOP OR CONTROL WORRYING: NOT AT ALL
GAD7 TOTAL SCORE: 1
7. FEELING AFRAID AS IF SOMETHING AWFUL MIGHT HAPPEN: NOT AT ALL
1. FEELING NERVOUS, ANXIOUS, OR ON EDGE: SEVERAL DAYS
3. WORRYING TOO MUCH ABOUT DIFFERENT THINGS: NOT AT ALL
7. FEELING AFRAID AS IF SOMETHING AWFUL MIGHT HAPPEN: NOT AT ALL

## 2024-07-01 ASSESSMENT — PATIENT HEALTH QUESTIONNAIRE - PHQ9
SUM OF ALL RESPONSES TO PHQ QUESTIONS 1-9: 5
10. IF YOU CHECKED OFF ANY PROBLEMS, HOW DIFFICULT HAVE THESE PROBLEMS MADE IT FOR YOU TO DO YOUR WORK, TAKE CARE OF THINGS AT HOME, OR GET ALONG WITH OTHER PEOPLE: SOMEWHAT DIFFICULT
SUM OF ALL RESPONSES TO PHQ QUESTIONS 1-9: 5

## 2024-07-01 ASSESSMENT — PAIN SCALES - GENERAL: PAINLEVEL: NO PAIN (0)

## 2024-07-01 NOTE — PROGRESS NOTES
OUTPATIENT PSYCHIATRY: FOLLOW UP ASSESSMENT (ADULT)     Identifying Information:  Gallito Doshi MRN# [id number]   Age: 24 year old YOB: 2000     Initial Psychiatry Visit Date: 5/29/24        Assessment & Plan     This is a 24 year old male patient who is seen today for follow up. Depression and anxiety are improved, some situational improvements are better at work as well. For right now we will keep medications the same. We did discuss an increase, but there area  lot of situational stressors that will not be impacted by a medication increase -- he will message me if he feels an increase would be beneficial.    (F33.1) Moderate episode of recurrent major depressive disorder (H)  Comment: Low motivation, low energy. Irritability - most of this is related to work, and likely won't improve until hours are able to improve, which they are currently working on. His overall mood has slightly improved though. No change in dosage at this time.  Plan: venlafaxine (EFFEXOR XR) 150 MG 24 hr capsule     (F41.9) Anxiety  Comment: Most anxiety stems from work and worrying about losing his job. He is also working a lot of hours due to being short-staffed in a very high-stress job.  Plan: venlafaxine (EFFEXOR XR) 150 MG 24 hr capsule    Laboratory: Gallito states his family has a history of thyroid problems. TSH last checked 2/5/2017 and was WNL. Vitamin B12 and D last checked 11/29/16 and 11/30/16. Will recheck all. He has an upcoming appt with Dr. Zapata on 7/19/24 and will likely have other labs at that time, will plan to have drawn then.    Referrals: None    Follow Up: Return for follow up in 3 month  [facility] concerns or need for increasing venlafaxine          History of Present Illness     Gallito Doshi is a 24 year old male with a reported history of MDD and anxiety who is here today for follow up. Gallito attended the session alone. Gallito was last seen on 5/29/24  "for initial appt when we increased his Effexor XR to 150 mg daily. Stress at work is slightly improved. They had an interview for a full time EMT. Some improvement with the increased venlafaxine, not feeling as low of a mood, irritabiilty pretty much the same, just because of the situational stressors.         Psychiatric Review of Symptoms     Mood/Depression: Mood is a little better with the Effexor increase. Endorses depressed mood, low energy, insomnia, poor concentration /memory, and overwhelmed.    Anxiety: Anxiety has been better, less anxious about being fired. Endorses excessive worry, social anxiety, and nervous/overwhelmed. Social anxiety - doesn't like big crowds, doesn't like talking to people he doesn't know, not the most outgoing person, introvert.     Panic Attacks: Denies panic attacks.     Sleep: still all over the place. 10-12 hours, when not sleeping up for 20+ hours, on edge    Appetite: not very good. Sometimes 1-2 times per day. Needs to have breakfast, wakes up with very bad GERD, and is very sensitive to Blue dies/red and certain powdered gravy. Encouraged him to talk to Dr. Zapata about it, sees her soon.     Concentration/Distractibility: video games it's fine, if other things it's kind of scattered. On the job he is very focused. Otherwise sort of \"scatterbrained\".  He was started on Adderall after [facility] hospitalization but then he didn't eat for 3-4 days. His grandma took his keys and said he couldn't leave until he ate.  He had difficulty in school with ADHD symptoms - was put on an IEP for issues with teachers and authority, couldn't concentrate, was diagnosed with oppositional defiance disorder. All of this occurred right after cousin's suicide. He quit taking the Adderall because he didn't like the way it made him feel.    Activity/Energy: has some motivation, but difficult when working all the time    Current psychosocial stressors: financial hardship, limited social support, " "mental health symptoms, and occupational / vocational stress     PTSD: Endorses occasional intrusive memories, nightmares, and flashbacks. Rough calls, they come back for awhile and then they go away. No real big flashbacks; Not constant nightmares.    Substance Use: No change. Current use includes: 12-pack a week (cut back)     Suicidal Ideation: Denies suicidal ideation or self-harm    Homicidal Ideation: Denies homicidal ideation    Therapy: Catracho, seems to be helping         Past Medical/Surgical History     Medical diagnoses: History reviewed. No pertinent past medical history.    Surgical history:   Past Surgical History:   Procedure Laterality Date    LAPAROSCOPIC APPENDECTOMY N/A 7/24/2022    Procedure: APPENDECTOMY, LAPAROSCOPIC;  Surgeon: Kenny Zambrano DO;  Location: [facility] OR    SCLERAL BUCKLE Right 10/17/2019    Left eye done 03/2020          Medical Review of Systems     Allergies: Patient has no known allergies.          Vital Signs: /72 (Cuff Size: Adult Large)   Pulse 87   Temp 97.1  F (36.2  C) (Tympanic)   Ht 1.854 m (6' 1\")   Wt 117.9 kg (260 lb)   SpO2 98%   BMI 34.30 kg/m            Weight: 260 lbs 0 oz  BMI: Body mass index is 34.3 kg/m .    Physical Exam: Please refer to physical exam completed by primary care provider.    10 point review of systems is otherwise negative unless noted above.           Mental Status Examination     Appearance:  awake, alert, adequately groomed, and appeared as age stated  Alertness:  alert  and oriented  Attitude:  cooperative  Behavior/Demeanor:  cooperative, pleasant, and calm, with good  eye contact.  Mood:  good and was congruent to speech content.  Affect:  appropriate and in normal range, mood congruent, intensity is normal, and full range  Speech:  normal and regular rate and rhythm  Psychomotor Behavior:  no evidence of tardive dyskinesia, dystonia, or tics and intact station, gait and muscle tone  Thought Process:  logical, linear, and goal " oriented without any evidence of loose associations, flight of ideas, tangentiality, or circumstantiality.  Thought Content:  no evidence of suicidal ideation or homicidal ideation, no evidence of psychotic thought, no auditory hallucinations present, and no visual hallucinations present  Insight:  good  Judgment: good  Cognition:  time, person, and place  Attention Span and Concentration:  intact  Recent and Remote Memory:  intact  Language:  intact  Fund of Knowledge: appropriate  Muscle Strength and Tone: normal  Gait and Station: Normal     These cognitive functions grossly appear as described, but were not formally tested.         Labs     No results found for this or any previous visit (from the past 24 hour(s)).    Labs were reviewed, no concerns.         Medications                                                                  BOLD psych meds     I have reviewed this patient's current medications.    Current Outpatient Medications   Medication Sig Dispense Refill    venlafaxine (EFFEXOR XR) 150 MG 24 hr capsule Take 1 capsule (150 mg) by mouth daily 90 capsule 1     No current facility-administered medications for this visit.     Reviewed PDMP: N/A     Past medication trials:   Fluoxetine (19-20), venlafaxine (3/11/24-present)  Hydroxyzine (16-19)  Melatonin (16-present), Trazodone (19)  Olanzapine ODT/Inj (16-22), Quetiapine (16-19)  Adderall (age 16)          PHQ-9 / ADRY-7                       Reviewed PHQ-9 and ADRY-7 screenings.         5/14/2024     8:56 AM 5/29/2024     7:53 AM 7/1/2024     7:50 AM   PHQ-9 SCORE   PHQ-9 Total Score MyChart  7 (Mild depression) 5 (Mild depression)   PHQ-9 Total Score 6 7 5         5/14/2024     8:56 AM 5/29/2024     7:54 AM 7/1/2024     7:51 AM   ADRY-7 SCORE   Total Score  4 (minimal anxiety) 1 (minimal anxiety)   Total Score 2 4 1              43 minutes spent by me on the date of the encounter doing chart review, patient visit, and documentation     Analy Serrano,  BRITTNY GIMENEZ    Patient was instructed to monitor for worsening symptoms and side effects from medications. If there is a serious deterioration of mood or any dangerous-type behaviors (suicidal/homicidal ideations) patient is advised to call 911 or report directly to the nearest Emergency Department.     Treatment Risk Statement: The risks, benefits, alternatives and potential adverse effects have been explained and are understood by the patient. The patient agrees to the treatment plan with the ability to do so. The patient knows to call the clinic for any problems or access emergency care if needed.

## 2024-07-01 NOTE — PATIENT INSTRUCTIONS
Thank you for allowing Analy Serrano DNP, APRN to participate in your care.  If you have a scheduling or an appointment question please contact our scheduling department at their direct line [phone].   ALL nursing questions or concerns can be directed to the psychiatry nurses at [phone] or [phone]

## 2024-07-02 ASSESSMENT — ANXIETY QUESTIONNAIRES
3. WORRYING TOO MUCH ABOUT DIFFERENT THINGS: SEVERAL DAYS
2. NOT BEING ABLE TO STOP OR CONTROL WORRYING: NOT AT ALL
5. BEING SO RESTLESS THAT IT IS HARD TO SIT STILL: NOT AT ALL
GAD7 TOTAL SCORE: 6
6. BECOMING EASILY ANNOYED OR IRRITABLE: SEVERAL DAYS
3. WORRYING TOO MUCH ABOUT DIFFERENT THINGS: NOT AT ALL
1. FEELING NERVOUS, ANXIOUS, OR ON EDGE: NOT AT ALL
GAD7 TOTAL SCORE: 6
2. NOT BEING ABLE TO STOP OR CONTROL WORRYING: SEVERAL DAYS
7. FEELING AFRAID AS IF SOMETHING AWFUL MIGHT HAPPEN: NOT AT ALL
1. FEELING NERVOUS, ANXIOUS, OR ON EDGE: MORE THAN HALF THE DAYS
6. BECOMING EASILY ANNOYED OR IRRITABLE: SEVERAL DAYS

## 2024-07-02 ASSESSMENT — PATIENT HEALTH QUESTIONNAIRE - PHQ9
5. POOR APPETITE OR OVEREATING: SEVERAL DAYS
5. POOR APPETITE OR OVEREATING: SEVERAL DAYS
SUM OF ALL RESPONSES TO PHQ QUESTIONS 1-9: 2
SUM OF ALL RESPONSES TO PHQ QUESTIONS 1-9: 6

## 2024-07-02 NOTE — PROGRESS NOTES
Progress Note    Client Name: Gallito Doshi  Date: July 1 2024         Service Type: Individual      Session Start Time: 1000  Session End Time: 1040      Session Length: 40          Attendees: Client attended alone        PHQ-9 :       5/14/2024     8:56 AM 5/29/2024     7:53 AM 7/1/2024     7:50 AM   PHQ-9 SCORE   PHQ-9 Total Score MyChart  7 (Mild depression) 5 (Mild depression)   PHQ-9 Total Score 6 7 5      ADRY-7 :        5/14/2024     8:56 AM 5/29/2024     7:54 AM 7/1/2024     7:51 AM   ADRY-7 SCORE   Total Score  4 (minimal anxiety) 1 (minimal anxiety)   Total Score 2 4 1           DATA     Gallito Doshi presented as O x 4,coherent,relevant and pleasant. His memory.was intact .The pt exhibited no overt signs of psychotic processes. There was no report of symptoms indicating derailment of thought  such   A/V hallucinations.The pt denied S/H ideations and as a consequence  there was immediate no need  for a  safety plan.The patient's insight and judgement were within a range acceptable for safety. His fund of information appears to be within a normal range. There  were no reports of sleep disturbance nor disturbance in appetite. .    The theme of this session was the pt mental status. He reported feeling much better. He attributes his improved mental status  to a change in supervision at his job.He said that his new boss listens better.He has increased support deondre better schedule.. Had been doing a great deal of overtime. The pt enjoys his job.    Gallito agrees to increaser his level of activity. He enjoys swimming. He encouraged to increase his swimming when he ca n. He agrees.         Progress Since Last Session (Related to Symptoms / Goals / Homework):   Symptoms: Substantial improvement    Homework: Completed in session     Current / Ongoing Stressors and Concerns:     Adjusting sleep schedule     Treatment Objective(s) Addressed in This  Session:     Managing his anxiety/increased stress tolerance     Intervention:                Psychoeducation     Response to Interventions:   Receptive to     ASSESSMENT: Current Emotional / Mental Status (status of significant symptoms):   Risk status (Self / Other harm or suicidal ideation)   Client denies current fears or concerns for personal safety.   Client denies current or recent suicidal ideation or behaviors.   Client denies current or recent homicidal ideation or behaviors.   Client denies current or recent self injurious behavior or ideation.   Client denies other safety concerns.   Recommended that patient call 911 or go to the local ED should there be a change in any of these risk factors.     Appearance:   Appropriate    Eye Contact:   Fair    Psychomotor Behavior: Normal    Attitude:   Cooperative    Orientation:   All   Speech    Rate / Production: Normal     Volume:  Normal    Mood:    Anxious    Affect:    Mood congruent   Thought Content:  Clear    Thought Form:  Coherent  Logical    Insight:    Fair         Medication Compliance:   Yes     Changes in Health Issues:   None reported     Chemical Use Review:   Substance Use: Chemical use reviewed, no active concerns identified      Tobacco Use: No current tobacco use.       Collateral Reports Completed:   Not Applicable    PLAN: (Client Tasks / Therapist Tasks / Other)    Encourage the pt at increasing his level of activity,Request a Vitamin B-12 level,    NAN Barrios    .The author of this note documented a reason for not sharing it with the patient.

## 2024-07-16 ENCOUNTER — OFFICE VISIT (OUTPATIENT)
Dept: PSYCHOLOGY | Facility: OTHER | Age: 24
End: 2024-07-16
Attending: COUNSELOR
Payer: COMMERCIAL

## 2024-07-16 DIAGNOSIS — F41.9 ANXIETY AND DEPRESSION: Primary | ICD-10-CM

## 2024-07-16 DIAGNOSIS — F32.A ANXIETY AND DEPRESSION: Primary | ICD-10-CM

## 2024-07-16 PROCEDURE — 90837 PSYTX W PT 60 MINUTES: CPT | Performed by: COUNSELOR
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
## 2024-07-18 NOTE — PATIENT INSTRUCTIONS
Decrease your alcohol use. Work on quitting alcohol. Your liver function is having issues with the alcohol  We will call or MyChart you with your lab results.      Patient Education   Preventive Care Advice   This is general advice given by our system to help you stay healthy. However, your care team may have specific advice just for you. Please talk to your care team about your preventive care needs.  Nutrition  Eat 5 or more servings of fruits and vegetables each day.  Try wheat bread, brown rice and whole grain pasta (instead of white bread, rice, and pasta).  Get enough calcium and vitamin D. Check the label on foods and aim for 100% of the RDA (recommended daily allowance).  Lifestyle  Exercise at least 150 minutes each week  (30 minutes a day, 5 days a week).  Do muscle strengthening activities 2 days a week. These help control your weight and prevent disease.  No smoking.  Wear sunscreen to prevent skin cancer.  Have a dental exam and cleaning every 6 months.  Yearly exams  See your health care team every year to talk about:  Any changes in your health.  Any medicines your care team has prescribed.  Preventive care, family planning, and ways to prevent chronic diseases.  Shots (vaccines)   HPV shots (up to age 26), if you've never had them before.  Hepatitis B shots (up to age 59), if you've never had them before.  COVID-19 shot: Get this shot when it's due.  Flu shot: Get a flu shot every year.  Tetanus shot: Get a tetanus shot every 10 years.  Pneumococcal, hepatitis A, and RSV shots: Ask your care team if you need these based on your risk.  Shingles shot (for age 50 and up)  General health tests  Diabetes screening:  Starting at age 35, Get screened for diabetes at least every 3 years.  If you are younger than age 35, ask your care team if you should be screened for diabetes.  Cholesterol test: At age 39, start having a cholesterol test every 5 years, or more often if advised.  Bone density scan (DEXA): At  age 50, ask your care team if you should have this scan for osteoporosis (brittle bones).  Hepatitis C: Get tested at least once in your life.  STIs (sexually transmitted infections)  Before age 24: Ask your care team if you should be screened for STIs.  After age 24: Get screened for STIs if you're at risk. You are at risk for STIs (including HIV) if:  You are sexually active with more than one person.  You don't use condoms every time.  You or a partner was diagnosed with a sexually transmitted infection.  If you are at risk for HIV, ask about PrEP medicine to prevent HIV.  Get tested for HIV at least once in your life, whether you are at risk for HIV or not.  Cancer screening tests  Cervical cancer screening: If you have a cervix, begin getting regular cervical cancer screening tests starting at age 21.  Breast cancer scan (mammogram): If you've ever had breasts, begin having regular mammograms starting at age 40. This is a scan to check for breast cancer.  Colon cancer screening: It is important to start screening for colon cancer at age 45.  Have a colonoscopy test every 10 years (or more often if you're at risk) Or, ask your provider about stool tests like a FIT test every year or Cologuard test every 3 years.  To learn more about your testing options, visit:   .  For help making a decision, visit:   https://bit.ly/iq75593.  Prostate cancer screening test: If you have a prostate, ask your care team if a prostate cancer screening test (PSA) at age 55 is right for you.  Lung cancer screening: If you are a current or former smoker ages 50 to 80, ask your care team if ongoing lung cancer screenings are right for you.  For informational purposes only. Not to replace the advice of your health care provider. Copyright   2023 [employer]. All rights reserved. Clinically reviewed by the [facility] Transitions Program. [employer] [id number] - REV 01/24.

## 2024-07-19 ENCOUNTER — OFFICE VISIT (OUTPATIENT)
Dept: FAMILY MEDICINE | Facility: OTHER | Age: 24
End: 2024-07-19
Attending: FAMILY MEDICINE
Payer: COMMERCIAL

## 2024-07-19 ENCOUNTER — LAB (OUTPATIENT)
Dept: LAB | Facility: OTHER | Age: 24
End: 2024-07-19
Attending: FAMILY MEDICINE
Payer: COMMERCIAL

## 2024-07-19 VITALS
TEMPERATURE: 97.7 F | HEIGHT: 73 IN | DIASTOLIC BLOOD PRESSURE: 94 MMHG | OXYGEN SATURATION: 98 % | SYSTOLIC BLOOD PRESSURE: 139 MMHG | RESPIRATION RATE: 17 BRPM | BODY MASS INDEX: 37.07 KG/M2 | HEART RATE: 78 BPM | WEIGHT: 279.7 LBS

## 2024-07-19 DIAGNOSIS — Z00.00 ROUTINE GENERAL MEDICAL EXAMINATION AT A HEALTH CARE FACILITY: Primary | ICD-10-CM

## 2024-07-19 DIAGNOSIS — R74.01 TRANSAMINITIS: ICD-10-CM

## 2024-07-19 DIAGNOSIS — F41.9 ANXIETY: ICD-10-CM

## 2024-07-19 DIAGNOSIS — Z13.220 LIPID SCREENING: ICD-10-CM

## 2024-07-19 DIAGNOSIS — Z23 NEED FOR VACCINATION: ICD-10-CM

## 2024-07-19 DIAGNOSIS — F33.1 MODERATE EPISODE OF RECURRENT MAJOR DEPRESSIVE DISORDER (H): ICD-10-CM

## 2024-07-19 DIAGNOSIS — Z78.9 ALCOHOL USE: ICD-10-CM

## 2024-07-19 DIAGNOSIS — Z00.00 ROUTINE GENERAL MEDICAL EXAMINATION AT A HEALTH CARE FACILITY: ICD-10-CM

## 2024-07-19 DIAGNOSIS — Z11.59 NEED FOR HEPATITIS C SCREENING TEST: ICD-10-CM

## 2024-07-19 LAB
ALBUMIN SERPL BCG-MCNC: 4.9 G/DL (ref 3.5–5.2)
ALP SERPL-CCNC: 134 U/L (ref 40–150)
ALT SERPL W P-5'-P-CCNC: 128 U/L (ref 0–70)
ANION GAP SERPL CALCULATED.3IONS-SCNC: 12 MMOL/L (ref 7–15)
AST SERPL W P-5'-P-CCNC: 63 U/L (ref 0–45)
BASOPHILS # BLD AUTO: 0 10E3/UL (ref 0–0.2)
BASOPHILS NFR BLD AUTO: 1 %
BILIRUB SERPL-MCNC: 0.5 MG/DL
BUN SERPL-MCNC: 12.9 MG/DL (ref 6–20)
CALCIUM SERPL-MCNC: 9.9 MG/DL (ref 8.8–10.4)
CHLORIDE SERPL-SCNC: 99 MMOL/L (ref 98–107)
CHOLEST SERPL-MCNC: 242 MG/DL
CREAT SERPL-MCNC: 1.05 MG/DL (ref 0.67–1.17)
EGFRCR SERPLBLD CKD-EPI 2021: >90 ML/MIN/1.73M2
EOSINOPHIL # BLD AUTO: 0.3 10E3/UL (ref 0–0.7)
EOSINOPHIL NFR BLD AUTO: 5 %
ERYTHROCYTE [DISTWIDTH] IN BLOOD BY AUTOMATED COUNT: 11.7 % (ref 10–15)
FASTING STATUS PATIENT QL REPORTED: YES
FASTING STATUS PATIENT QL REPORTED: YES
FERRITIN SERPL-MCNC: 724 NG/ML (ref 31–409)
GLUCOSE SERPL-MCNC: 97 MG/DL (ref 70–99)
HBV SURFACE AB SERPL IA-ACNC: 3.63 M[IU]/ML
HBV SURFACE AB SERPL IA-ACNC: NONREACTIVE M[IU]/ML
HBV SURFACE AG SERPL QL IA: NONREACTIVE
HCO3 SERPL-SCNC: 25 MMOL/L (ref 22–29)
HCT VFR BLD AUTO: 42.6 % (ref 40–53)
HCV AB SERPL QL IA: NONREACTIVE
HDLC SERPL-MCNC: 36 MG/DL
HGB BLD-MCNC: 14.9 G/DL (ref 13.3–17.7)
IMM GRANULOCYTES # BLD: 0 10E3/UL
IMM GRANULOCYTES NFR BLD: 0 %
IRON BINDING CAPACITY (ROCHE): 349 UG/DL (ref 240–430)
IRON SATN MFR SERPL: 43 % (ref 15–46)
IRON SERPL-MCNC: 151 UG/DL (ref 61–157)
LDLC SERPL CALC-MCNC: 144 MG/DL
LYMPHOCYTES # BLD AUTO: 2.7 10E3/UL (ref 0.8–5.3)
LYMPHOCYTES NFR BLD AUTO: 42 %
MCH RBC QN AUTO: 31.4 PG (ref 26.5–33)
MCHC RBC AUTO-ENTMCNC: 35 G/DL (ref 31.5–36.5)
MCV RBC AUTO: 90 FL (ref 78–100)
MONOCYTES # BLD AUTO: 0.5 10E3/UL (ref 0–1.3)
MONOCYTES NFR BLD AUTO: 7 %
NEUTROPHILS # BLD AUTO: 3 10E3/UL (ref 1.6–8.3)
NEUTROPHILS NFR BLD AUTO: 46 %
NONHDLC SERPL-MCNC: 206 MG/DL
NRBC # BLD AUTO: 0 10E3/UL
NRBC BLD AUTO-RTO: 0 /100
PLATELET # BLD AUTO: 211 10E3/UL (ref 150–450)
POTASSIUM SERPL-SCNC: 4.2 MMOL/L (ref 3.4–5.3)
PROT SERPL-MCNC: 8.1 G/DL (ref 6.4–8.3)
RBC # BLD AUTO: 4.75 10E6/UL (ref 4.4–5.9)
SODIUM SERPL-SCNC: 136 MMOL/L (ref 135–145)
TRIGL SERPL-MCNC: 310 MG/DL
TSH SERPL DL<=0.005 MIU/L-ACNC: 3.42 UIU/ML (ref 0.3–4.2)
VIT B12 SERPL-MCNC: 865 PG/ML (ref 232–1245)
VIT D+METAB SERPL-MCNC: 23 NG/ML (ref 20–50)
WBC # BLD AUTO: 6.6 10E3/UL (ref 4–11)

## 2024-07-19 PROCEDURE — 36415 COLL VENOUS BLD VENIPUNCTURE: CPT | Performed by: FAMILY MEDICINE

## 2024-07-19 PROCEDURE — 90651 9VHPV VACCINE 2/3 DOSE IM: CPT | Performed by: FAMILY MEDICINE

## 2024-07-19 PROCEDURE — 80050 GENERAL HEALTH PANEL: CPT

## 2024-07-19 PROCEDURE — 82607 VITAMIN B-12: CPT

## 2024-07-19 PROCEDURE — 82728 ASSAY OF FERRITIN: CPT | Performed by: FAMILY MEDICINE

## 2024-07-19 PROCEDURE — 87340 HEPATITIS B SURFACE AG IA: CPT | Performed by: FAMILY MEDICINE

## 2024-07-19 PROCEDURE — 99213 OFFICE O/P EST LOW 20 MIN: CPT | Mod: 25 | Performed by: FAMILY MEDICINE

## 2024-07-19 PROCEDURE — 83540 ASSAY OF IRON: CPT | Performed by: FAMILY MEDICINE

## 2024-07-19 PROCEDURE — 86803 HEPATITIS C AB TEST: CPT | Performed by: FAMILY MEDICINE

## 2024-07-19 PROCEDURE — 90471 IMMUNIZATION ADMIN: CPT | Performed by: FAMILY MEDICINE

## 2024-07-19 PROCEDURE — 86706 HEP B SURFACE ANTIBODY: CPT | Performed by: FAMILY MEDICINE

## 2024-07-19 PROCEDURE — 80061 LIPID PANEL: CPT

## 2024-07-19 PROCEDURE — 99395 PREV VISIT EST AGE 18-39: CPT | Mod: 25 | Performed by: FAMILY MEDICINE

## 2024-07-19 PROCEDURE — 82306 VITAMIN D 25 HYDROXY: CPT

## 2024-07-19 PROCEDURE — 83550 IRON BINDING TEST: CPT | Performed by: FAMILY MEDICINE

## 2024-07-19 SDOH — HEALTH STABILITY: PHYSICAL HEALTH: ON AVERAGE, HOW MANY MINUTES DO YOU ENGAGE IN EXERCISE AT THIS LEVEL?: 20 MIN

## 2024-07-19 SDOH — HEALTH STABILITY: PHYSICAL HEALTH: ON AVERAGE, HOW MANY DAYS PER WEEK DO YOU ENGAGE IN MODERATE TO STRENUOUS EXERCISE (LIKE A BRISK WALK)?: 2 DAYS

## 2024-07-19 ASSESSMENT — SOCIAL DETERMINANTS OF HEALTH (SDOH): HOW OFTEN DO YOU GET TOGETHER WITH FRIENDS OR RELATIVES?: ONCE A WEEK

## 2024-07-19 ASSESSMENT — PAIN SCALES - GENERAL: PAINLEVEL: NO PAIN (0)

## 2024-07-19 NOTE — PROGRESS NOTES
Preventive Care Visit  [facility]  Nelly Zapata MD, Family Medicine  Jul 19, 2024      Assessment & Plan     Routine general medical examination at a health care facility  Discussed and updated preventive cares  Repeat wellness visit in one year   - CBC with Platelets & Differential; Future    Transaminitis / alcohol use  Consistently ALT>AST.  Currently using alcohol, but will cut back. Pattern of AST/ALT not completely consistent with alcohol use. TSH wnl  Gallito will be working with his therapist to cut back and quit alcohol   Ferritin elevated -> complete lab work ordered, but not able to add on. Ferritin may be elevated d/t stress and alcohol use. 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 Fib 4 of 0.69 which is low  - Hepatitis B surface antigen  - Hepatitis B Surface Antibody  - Iron & Iron Binding Capacity  - Ferritin  - US Abdomen Limited; Future  - Alpha-1-antitrypsin total  - Ceruloplasmin  - F Actin EIA with reflex  - Tissue transglutaminase lashawn IgA and IgG  - Mitochondrial M2 Antibody IgG  - IgG    Moderate episode of recurrent major depressive disorder (H) / Anxiety  Follows with Analy  Mood has improved. Job stress improved with new leadership  - c/w effexor, with consideration for increased dosage     Lipid screening  LDL of 144   - Lipid Profile (Chol, Trig, HDL, LDL calc); Future  - Comprehensive metabolic panel; Future    Need for hepatitis C screening test  - Hepatitis C Screen Reflex to HCV RNA Quant and Genotype; Future  - Hepatitis C Screen Reflex to HCV RNA Quant and Genotype    Need for vaccination  HPV updated today         Counseling  Appropriate preventive services were addressed with this patient via screening, questionnaire, or discussion as appropriate for fall prevention, nutrition, physical activity, Tobacco-use cessation, weight loss and cognition.  Checklist reviewing preventive services available has  been given to the patient.  Reviewed patient's diet, addressing concerns and/or questions.   He is at risk for lack of exercise and has been provided with information to increase physical activity for the benefit of his well-being.   The patient was instructed to see the dentist every 6 months.   He is at risk for psychosocial distress and has been provided with information to reduce risk.   The patient reports drinking more than 3 alcoholic drinks per day and/or more than 7 drhnks per week. The patient was counseled and given information about possible harmful effects of excessive alcohol intake.    See Patient Instructions    Return in about 53 weeks (around 7/25/2025) for Annual Wellness Visit.    Ana Conti is a 24 year old, presenting for the following:  Physical, Anxiety, and Depression        7/19/2024     8:50 AM   Additional Questions   Roomed by Isabel Sanchez   Accompanied by self        Health Care Directive  Patient does not have a Health Care Directive or Living Will: Discussed advance care planning with patient; however, patient declined at this time.    HPI    Depression and Anxiety   How are you doing with your depression since your last visit? Improved   How are you doing with your anxiety since your last visit?  Improved   Are you having other symptoms that might be associated with depression or anxiety? No  Have you had a significant life event? No   Do you have any concerns with your use of alcohol or other drugs? Yes:  Alcohol    - effexor is helping with the increase   - follows with Analy   - no SI    # Transaminitis  - alcohol use - increased alcohol. Drinking 3 to 4 days / week. 1/2 bottle of hard liquor   - wants to quit  - plans on talking with Cat[facility] (therapy)  - hepatitis screen     Social History     Tobacco Use    Smoking status: Never    Smokeless tobacco: Never   Substance Use Topics    Alcohol use: Yes     Comment: social    Drug use: Never         7/1/2024     7:50 AM  7/2/2024     9:01 AM 7/2/2024     9:26 AM   PHQ   PHQ-9 Total Score 5 6 2   Q9: Thoughts of better off dead/self-harm past 2 weeks Not at all Not at all Not at all         5/29/2024     7:54 AM 7/1/2024     7:51 AM 7/2/2024     9:01 AM   ADRY-7 SCORE   Total Score 4 (minimal anxiety) 1 (minimal anxiety)    Total Score 4 1 6         7/2/2024     9:26 AM   Last PHQ-9   1.  Little interest or pleasure in doing things 0   2.  Feeling down, depressed, or hopeless 0   3.  Trouble falling or staying asleep, or sleeping too much 1   4.  Feeling tired or having little energy 1   5.  Poor appetite or overeating 0   6.  Feeling bad about yourself 0   7.  Trouble concentrating 0   8.  Moving slowly or restless 0   Q9: Thoughts of better off dead/self-harm past 2 weeks 0   PHQ-9 Total Score 2         7/2/2024     9:26 AM   ADRY-7    1. Feeling nervous, anxious, or on edge 0   2. Not being able to stop or control worrying 0   3. Worrying too much about different things 0   4. Trouble relaxing 1   6. Becoming easily annoyed or irritable 1           7/19/2024   General Health   How would you rate your overall physical health? (!) FAIR   Feel stress (tense, anxious, or unable to sleep) Only a little      (!) STRESS CONCERN      7/19/2024   Nutrition   Three or more servings of calcium each day? (!) I DON'T KNOW   Diet: Regular (no restrictions)   How many servings of fruit and vegetables per day? (!) 0-1   How many sweetened beverages each day? (!) 2            7/19/2024   Exercise   Days per week of moderate/strenous exercise 2 days   Average minutes spent exercising at this level 20 min      (!) EXERCISE CONCERN      7/19/2024   Social Factors   Frequency of gathering with friends or relatives Once a week   Worry food won't last until get money to buy more Yes   Food not last or not have enough money for food? Yes   Do you have housing? (Housing is defined as stable permanent housing and does not include staying ouside in a car, in  a tent, in an abandoned building, in an overnight shelter, or couch-surfing.) Yes   Are you worried about losing your housing? No   Lack of transportation? No   Unable to get utilities (heat,electricity)? No      (!) FOOD SECURITY CONCERN PRESENT      7/19/2024   Dental   Dentist two times every year? (!) NO            7/19/2024   TB Screening   Were you born outside of the [place]? No            Today's PHQ-9 Score:       7/2/2024     9:26 AM   PHQ-9 SCORE   PHQ-9 Total Score 2           7/19/2024   Substance Use   Alcohol more than 3/day or more than 7/wk Yes   How often do you have a drink containing alcohol 2 to 3 times a week   How many alcohol drinks on typical day 3 or 4   How often do you have 5+ drinks at one occasion Monthly   Audit 2/3 Score 3   How often not able to stop drinking once started Less than monthly   How often failed to do what normally expected Never   How often needed first drink in am after a heavy drinking session Never   How often feeling of guilt or remorse after drinking Less than monthly   How often unable to remember what happened the night before Never   Have you or someone else been injured because of your drinking No   Has anyone been concerned or suggested you cut down on drinking No   TOTAL SCORE - AUDIT 8   Do you use any other substances recreationally? No        Social History     Tobacco Use    Smoking status: Never    Smokeless tobacco: Never   Substance Use Topics    Alcohol use: Yes     Comment: social    Drug use: Never           7/19/2024   STI Screening   New sexual partner(s) since last STI/HIV test? No            7/19/2024   Contraception/Family Planning   Questions about contraception or family planning No        Reviewed and updated as needed this visit by Provider   Tobacco  Allergies  Meds  Problems  Med Hx  Surg Hx  Fam Hx              Review of Systems  Constitutional, HEENT, cardiovascular, pulmonary, gi and gu systems are negative, except as otherwise  "noted.     Objective    Exam  BP (!) 139/94   Pulse 78   Temp 97.7  F (36.5  C) (Tympanic)   Resp 17   Ht 1.854 m (6' 1\")   Wt 126.9 kg (279 lb 11.2 oz)   SpO2 98%   BMI 36.90 kg/m     Estimated body mass index is 36.9 kg/m  as calculated from the following:    Height as of this encounter: 1.854 m (6' 1\").    Weight as of this encounter: 126.9 kg (279 lb 11.2 oz).    Physical Exam  Constitutional:       General: He is not in acute distress.     Appearance: He is well-developed.   HENT:      Head: Normocephalic and atraumatic.      Right Ear: Tympanic membrane normal.      Left Ear: Tympanic membrane normal.      Mouth/Throat:      Mouth: Mucous membranes are moist.      Pharynx: No oropharyngeal exudate.   Eyes:      Extraocular Movements: Extraocular movements intact.      Conjunctiva/sclera: Conjunctivae normal.   Neck:      Thyroid: No thyromegaly.   Cardiovascular:      Rate and Rhythm: Normal rate and regular rhythm.      Pulses: Normal pulses.      Heart sounds: No murmur heard.  Pulmonary:      Effort: Pulmonary effort is normal. No respiratory distress.      Breath sounds: No wheezing or rales.   Abdominal:      General: Bowel sounds are normal. There is no distension.      Palpations: Abdomen is soft.      Tenderness: There is no abdominal tenderness. There is no guarding.   Musculoskeletal:         General: Normal range of motion.      Cervical back: Normal range of motion and neck supple.      Right lower leg: No edema.      Left lower leg: No edema.   Lymphadenopathy:      Cervical: No cervical adenopathy.   Skin:     General: Skin is warm and dry.   Neurological:      Mental Status: He is alert.   Psychiatric:         Mood and Affect: Mood normal.         Results for orders placed or performed in visit on 07/19/24   Iron & Iron Binding Capacity     Status: Normal   Result Value Ref Range    Iron 151 61 - 157 ug/dL    Iron Binding Capacity 349 240 - 430 ug/dL    Iron Sat Index 43 15 - 46 % "   Ferritin     Status: Abnormal   Result Value Ref Range    Ferritin 724 (H) 31 - 409 ng/mL   Results for orders placed or performed in visit on 07/19/24   Comprehensive metabolic panel     Status: Abnormal   Result Value Ref Range    Sodium 136 135 - 145 mmol/L    Potassium 4.2 3.4 - 5.3 mmol/L    Carbon Dioxide (CO2) 25 22 - 29 mmol/L    Anion Gap 12 7 - 15 mmol/L    Urea Nitrogen 12.9 6.0 - 20.0 mg/dL    Creatinine 1.05 0.67 - 1.17 mg/dL    GFR Estimate >90 >60 mL/min/1.73m2    Calcium 9.9 8.8 - 10.4 mg/dL    Chloride 99 98 - 107 mmol/L    Glucose 97 70 - 99 mg/dL    Alkaline Phosphatase 134 40 - 150 U/L    AST 63 (H) 0 - 45 U/L     (H) 0 - 70 U/L    Protein Total 8.1 6.4 - 8.3 g/dL    Albumin 4.9 3.5 - 5.2 g/dL    Bilirubin Total 0.5 <=1.2 mg/dL    Patient Fasting > 8hrs? Yes    Lipid Profile (Chol, Trig, HDL, LDL calc)     Status: Abnormal   Result Value Ref Range    Cholesterol 242 (H) <200 mg/dL    Triglycerides 310 (H) <150 mg/dL    Direct Measure HDL 36 (L) >=40 mg/dL    LDL Cholesterol Calculated 144 (H) <=100 mg/dL    Non HDL Cholesterol 206 (H) <130 mg/dL    Patient Fasting > 8hrs? Yes     Narrative    Cholesterol  Desirable:  <200 mg/dL    Triglycerides  Normal:  Less than 150 mg/dL  Borderline High:  150-199 mg/dL  High:  200-499 mg/dL  Very High:  Greater than or equal to 500 mg/dL    Direct Measure HDL  Female:  Greater than or equal to 50 mg/dL   Male:  Greater than or equal to 40 mg/dL    LDL Cholesterol  Desirable:  <100mg/dL  Above Desirable:  100-129 mg/dL   Borderline High:  130-159 mg/dL   High:  160-189 mg/dL   Very High:  >= 190 mg/dL    Non HDL Cholesterol  Desirable:  130 mg/dL  Above Desirable:  130-159 mg/dL  Borderline High:  160-189 mg/dL  High:  190-219 mg/dL  Very High:  Greater than or equal to 220 mg/dL   TSH with free T4 reflex     Status: Normal   Result Value Ref Range    TSH 3.42 0.30 - 4.20 uIU/mL   CBC with platelets and differential     Status: None   Result Value Ref  Range    WBC Count 6.6 4.0 - 11.0 10e3/uL    RBC Count 4.75 4.40 - 5.90 10e6/uL    Hemoglobin 14.9 13.3 - 17.7 g/dL    Hematocrit 42.6 40.0 - 53.0 %    MCV 90 78 - 100 fL    MCH 31.4 26.5 - 33.0 pg    MCHC 35.0 31.5 - 36.5 g/dL    RDW 11.7 10.0 - 15.0 %    Platelet Count 211 150 - 450 10e3/uL    % Neutrophils 46 %    % Lymphocytes 42 %    % Monocytes 7 %    % Eosinophils 5 %    % Basophils 1 %    % Immature Granulocytes 0 %    NRBCs per 100 WBC 0 <1 /100    Absolute Neutrophils 3.0 1.6 - 8.3 10e3/uL    Absolute Lymphocytes 2.7 0.8 - 5.3 10e3/uL    Absolute Monocytes 0.5 0.0 - 1.3 10e3/uL    Absolute Eosinophils 0.3 0.0 - 0.7 10e3/uL    Absolute Basophils 0.0 0.0 - 0.2 10e3/uL    Absolute Immature Granulocytes 0.0 <=0.4 10e3/uL    Absolute NRBCs 0.0 10e3/uL   CBC with Platelets & Differential     Status: None    Narrative    The following orders were created for panel order CBC with Platelets & Differential.  Procedure                               Abnormality         Status                     ---------                               -----------         ------                     CBC with platelets and d...[[id number]]                      Final result                 Please view results for these tests on the individual orders.         Signed Electronically by: Nelly Zapata MD

## 2024-07-22 ENCOUNTER — HOSPITAL ENCOUNTER (OUTPATIENT)
Dept: ULTRASOUND IMAGING | Facility: HOSPITAL | Age: 24
Discharge: HOME | End: 2024-07-22
Attending: FAMILY MEDICINE | Admitting: FAMILY MEDICINE
Payer: COMMERCIAL

## 2024-07-22 DIAGNOSIS — R74.01 TRANSAMINITIS: ICD-10-CM

## 2024-07-22 PROCEDURE — 76705 ECHO EXAM OF ABDOMEN: CPT

## 2024-08-27 ENCOUNTER — OFFICE VISIT (OUTPATIENT)
Dept: PSYCHOLOGY | Facility: OTHER | Age: 24
End: 2024-08-27
Attending: COUNSELOR
Payer: COMMERCIAL

## 2024-08-27 DIAGNOSIS — F32.A ANXIETY AND DEPRESSION: Primary | ICD-10-CM

## 2024-08-27 DIAGNOSIS — F41.9 ANXIETY AND DEPRESSION: Primary | ICD-10-CM

## 2024-08-27 PROCEDURE — 90834 PSYTX W PT 45 MINUTES: CPT | Performed by: COUNSELOR
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
## 2024-09-09 ENCOUNTER — OFFICE VISIT (OUTPATIENT)
Dept: PSYCHOLOGY | Facility: OTHER | Age: 24
End: 2024-09-09
Attending: COUNSELOR
Payer: COMMERCIAL

## 2024-09-09 DIAGNOSIS — F41.9 ANXIETY AND DEPRESSION: Primary | ICD-10-CM

## 2024-09-09 DIAGNOSIS — F32.A ANXIETY AND DEPRESSION: Primary | ICD-10-CM

## 2024-09-09 PROCEDURE — 90837 PSYTX W PT 60 MINUTES: CPT | Performed by: COUNSELOR

## 2024-09-09 ASSESSMENT — ANXIETY QUESTIONNAIRES
1. FEELING NERVOUS, ANXIOUS, OR ON EDGE: NOT AT ALL
7. FEELING AFRAID AS IF SOMETHING AWFUL MIGHT HAPPEN: NOT AT ALL
6. BECOMING EASILY ANNOYED OR IRRITABLE: NOT AT ALL
2. NOT BEING ABLE TO STOP OR CONTROL WORRYING: NOT AT ALL
7. FEELING AFRAID AS IF SOMETHING AWFUL MIGHT HAPPEN: NOT AT ALL
1. FEELING NERVOUS, ANXIOUS, OR ON EDGE: NOT AT ALL
5. BEING SO RESTLESS THAT IT IS HARD TO SIT STILL: NOT AT ALL
2. NOT BEING ABLE TO STOP OR CONTROL WORRYING: NOT AT ALL
6. BECOMING EASILY ANNOYED OR IRRITABLE: SEVERAL DAYS
3. WORRYING TOO MUCH ABOUT DIFFERENT THINGS: NOT AT ALL
3. WORRYING TOO MUCH ABOUT DIFFERENT THINGS: NOT AT ALL
5. BEING SO RESTLESS THAT IT IS HARD TO SIT STILL: NOT AT ALL
IF YOU CHECKED OFF ANY PROBLEMS ON THIS QUESTIONNAIRE, HOW DIFFICULT HAVE THESE PROBLEMS MADE IT FOR YOU TO DO YOUR WORK, TAKE CARE OF THINGS AT HOME, OR GET ALONG WITH OTHER PEOPLE: NOT DIFFICULT AT ALL
GAD7 TOTAL SCORE: 1
GAD7 TOTAL SCORE: 1

## 2024-09-09 ASSESSMENT — PATIENT HEALTH QUESTIONNAIRE - PHQ9
SUM OF ALL RESPONSES TO PHQ QUESTIONS 1-9: 3
5. POOR APPETITE OR OVEREATING: NOT AT ALL

## 2024-09-16 NOTE — PROGRESS NOTES
Progress Note    Client Name: Gallito Doshi  Date: September 9, 2024         Service Type: Individual      Session Start Time: 1000  Session End Time: 1055      Session Length: 55         Attendees: Client attended alone       PHQ-9 :       7/2/2024     9:01 AM 7/2/2024     9:26 AM 9/9/2024    11:14 AM   PHQ-9 SCORE   PHQ-9 Total Score 6 2 3      ADRY-7 :        7/1/2024     7:51 AM 7/2/2024     9:01 AM 9/9/2024    11:14 AM   ADRY-7 SCORE   Total Score 1 (minimal anxiety)     Total Score 1 6 1           DATA    Gallito Doshi presented as O x 4,coherent,relevant and pleasant. memory.was intact .The pt exhibited no overt signs of psychotic processes. There was no report of symptoms indicating derailment of thought  such as  A/V hallucinations.The pt denied S/H ideations and as a consequence  there was immediate no need  for a  safety     Gallito continue to work regular on a 24 hours  on and 48 hours schedule,He reports that has spent his days off with his family camping in the [facility].He hope to do this again before the weather get bad.The pt hasn't had suicide thoughts recently,but reports some depression.. The pt was encouraged to increase his range of activities outside of work. To alleviate his depression.           Progress Since Last Session (Related to Symptoms / Goals / Homework):   Symptoms: Improving      Homework: Completed in session     Current / Ongoing Stressors and Concerns:   Some recent dysphoria     Treatment Objective(s) Addressed in This Session:   Improved mood     Intervention:   Psychoeducation.     Response to Interventions:   Active listening     ASSESSMENT: Current Emotional / Mental Status (status of significant symptoms):   Risk status (Self / Other harm or suicidal ideation)   Client denies current fears or concerns for personal safety.   Client denies current or recent suicidal ideation or behaviors.   Client denies current or  recent homicidal ideation or behaviors.   Client denies current or recent self injurious behavior or ideation.   Client denies other safety concerns.   Recommended that patient call 911 or go to the local ED should there be a change in any of these risk factors.     Appearance:   Appropriate    Eye Contact:   Good    Psychomotor Behavior: Normal    Attitude:   Cooperative    Orientation:   All   Speech    Rate / Production: Normal     Volume:  Normal    Mood:    Anxious  Euthymic   Affect:    Mood congrtuent   Thought Content:  Clear    Thought Form:  Coherent  Logical    Insight:    Fair         Medication Compliance:   Yes     Changes in Health Issues:   None reported     Chemical Use Review:   Substance Use: Chemical use reviewed, no active concerns identified      Tobacco Use: No current tobacco use.       Collateral Reports Completed:   Not Applicable    PLAN: (Client Tasks / Therapist Tasks / Other)  Encourage the pt at increasing his range of activities    NAN Barrios  .r

## 2024-09-23 ENCOUNTER — OFFICE VISIT (OUTPATIENT)
Dept: PSYCHOLOGY | Facility: OTHER | Age: 24
End: 2024-09-23
Attending: COUNSELOR
Payer: COMMERCIAL

## 2024-09-23 DIAGNOSIS — F32.A ANXIETY AND DEPRESSION: Primary | ICD-10-CM

## 2024-09-23 DIAGNOSIS — F41.9 ANXIETY AND DEPRESSION: Primary | ICD-10-CM

## 2024-09-23 PROCEDURE — 90837 PSYTX W PT 60 MINUTES: CPT | Performed by: COUNSELOR

## 2024-09-26 ASSESSMENT — ANXIETY QUESTIONNAIRES
1. FEELING NERVOUS, ANXIOUS, OR ON EDGE: NOT AT ALL
GAD7 TOTAL SCORE: 0
7. FEELING AFRAID AS IF SOMETHING AWFUL MIGHT HAPPEN: NOT AT ALL
5. BEING SO RESTLESS THAT IT IS HARD TO SIT STILL: NOT AT ALL
3. WORRYING TOO MUCH ABOUT DIFFERENT THINGS: NOT AT ALL
GAD7 TOTAL SCORE: 0
2. NOT BEING ABLE TO STOP OR CONTROL WORRYING: NOT AT ALL
6. BECOMING EASILY ANNOYED OR IRRITABLE: NOT AT ALL

## 2024-09-26 ASSESSMENT — PATIENT HEALTH QUESTIONNAIRE - PHQ9
5. POOR APPETITE OR OVEREATING: NOT AT ALL
SUM OF ALL RESPONSES TO PHQ QUESTIONS 1-9: 0

## 2024-09-26 NOTE — PROGRESS NOTES
Progress Note    Client Name: Gallito Doshi  Date: September 23, 2024         Service Type: Individual      Session Start Time: 1000  Session End Time: 1055      Session Length: 55         Attendees: Client attended alone       PHQ-9 :       7/2/2024     9:26 AM 9/9/2024    11:14 AM 9/26/2024     7:26 AM   PHQ-9 SCORE   PHQ-9 Total Score 2 3 0      ADRY-7 :        7/2/2024     9:01 AM 9/9/2024    11:14 AM 9/26/2024     7:26 AM   ADRY-7 SCORE   Total Score 6 1 0           DATA   Gallito Doshi presented as O x 4,coherent,relevant and pleasant. memory.was intact .The pt exhibited no overt signs of psychotic processes. There was no report of symptoms indicating derailment of thought  such as  A/V hallucinations.The pt denied  present S/H ideations and as a consequence  there was no immediate  need  for a  safety plan.He hasn't had any  suicidal thoughts for sometime now,The patient's insight and judgement were within a range acceptable for safety. His fund of information appears to be within a normal range.     The pt reports that he is doing well since he has gotten a new boss. Has been to the [place] twice this simmer. The pt has been encourage to continue outdoor activities as a way of maintaining his mental well being.                      Progress Since Last Session (Related to Symptoms / Goals / Homework):   Symptoms: Improving      Homework: Completed in session     Current / Ongoing Stressors and Concerns:   Work schedule,emotional well-being     Treatment Objective(s) Addressed in This Session:      Improved mood     Intervention:   Behavioral activation          ASSESSMENT: Current Emotional / Mental Status (status of significant symptoms):   Risk status (Self / Other harm or suicidal ideation)   Client denies current fears or concerns for personal safety.   Client denies current or recent suicidal ideation or behaviors.   Client denies current or recent  homicidal ideation or behaviors.   Client denies current or recent self injurious behavior or ideation.   Client denies other safety concerns.   Recommended that patient call 911 or go to the local ED should there be a change in any of these risk factors.     Appearance:   Appropriate    Eye Contact:   Good    Psychomotor Behavior: Normal    Attitude:   Cooperative    Orientation:   All   Speech    Rate / Production: Normal     Volume:  Normal    Mood:    Anxious  Dysphoric   Affect:               Mood congruent   Thought Content:  Clear    Thought Form:  Coherent  Logical    Insight:    Good         Medication Compliance:   Yes     Changes in Health Issues:   None reported     Chemical Use Review:   Substance Use: Chemical use reviewed, no active concerns identified      Tobacco Use: No current tobacco use.       Collateral Reports Completed:   Not Applicable    PLAN: (Client Tasks / Therapist Tasks / Other)    Continue to encourage the pt at increasing his range of activity    NAN Barrios  The author of this note documented a reason for not sharing it with the patient.

## 2024-09-30 ENCOUNTER — OFFICE VISIT (OUTPATIENT)
Dept: PSYCHIATRY | Facility: OTHER | Age: 24
End: 2024-09-30
Payer: COMMERCIAL

## 2024-09-30 ENCOUNTER — TELEPHONE (OUTPATIENT)
Dept: PSYCHIATRY | Facility: OTHER | Age: 24
End: 2024-09-30

## 2024-09-30 VITALS
OXYGEN SATURATION: 98 % | HEART RATE: 95 BPM | SYSTOLIC BLOOD PRESSURE: 128 MMHG | DIASTOLIC BLOOD PRESSURE: 74 MMHG | HEIGHT: 73 IN | TEMPERATURE: 97.5 F | WEIGHT: 255 LBS | BODY MASS INDEX: 33.8 KG/M2

## 2024-09-30 DIAGNOSIS — F90.2 ADHD (ATTENTION DEFICIT HYPERACTIVITY DISORDER), COMBINED TYPE: Primary | ICD-10-CM

## 2024-09-30 DIAGNOSIS — F33.1 MODERATE EPISODE OF RECURRENT MAJOR DEPRESSIVE DISORDER (H): ICD-10-CM

## 2024-09-30 DIAGNOSIS — F41.9 ANXIETY: ICD-10-CM

## 2024-09-30 RX ORDER — METHYLPHENIDATE HYDROCHLORIDE 10 MG/1
10 CAPSULE, EXTENDED RELEASE ORAL EVERY MORNING
Qty: 30 CAPSULE | Refills: 0 | Status: SHIPPED | OUTPATIENT
Start: 2024-09-30

## 2024-09-30 RX ORDER — LISDEXAMFETAMINE DIMESYLATE 10 MG/1
10 CAPSULE ORAL EVERY MORNING
Qty: 30 CAPSULE | Refills: 0 | Status: SHIPPED | OUTPATIENT
Start: 2024-09-30 | End: 2024-09-30

## 2024-09-30 RX ORDER — VENLAFAXINE HYDROCHLORIDE 150 MG/1
150 CAPSULE, EXTENDED RELEASE ORAL DAILY
Qty: 90 CAPSULE | Refills: 1 | Status: SHIPPED | OUTPATIENT
Start: 2024-09-30

## 2024-09-30 ASSESSMENT — PAIN SCALES - GENERAL: PAINLEVEL: NO PAIN (0)

## 2024-09-30 NOTE — PATIENT INSTRUCTIONS
- Start Vyvanse 10 mg daily  - Follow up in 2 months - Check in via [employer] Direct message in 1 month to let me know how the Vyvanse is going. See if it should stay at 10 mg or if it needs to be increased at all.  Thank you for allowing Analy Serrano DNP, APRN to participate in your care.  If you have a scheduling or an appointment question please contact our scheduling department at their direct line [phone].   ALL nursing questions or concerns can be directed to the psychiatry nurses at [phone] or [phone]

## 2024-09-30 NOTE — TELEPHONE ENCOUNTER
We could first try methylphenidate (Ritalin LA) every morning. Same side effects, but should be cheaper. We could consider Good Rx if he doesn't tolerate the Ritalin, but he has never tried it yet.   Still want to check in with him in 1 month to see if we need to change the dose or keep the same.

## 2024-09-30 NOTE — TELEPHONE ENCOUNTER
Received a call from this patient stating that the Vyvanse is very expensive and is wondering if there is an alternative. Please Advise.

## 2024-09-30 NOTE — PROGRESS NOTES
OUTPATIENT PSYCHIATRY: FOLLOW UP ASSESSMENT (ADULT)     Identifying Information:  Gallito Doshi MRN# [id number]   Age: 24 year old YOB: 2000     Initial Psychiatry Visit Date: 5/29/24        Assessment & Plan     This is a 24 year old male patient who is seen today for follow up. He is doing well in regards to depressive/anxiety symptoms, but is now struggling more with ADHD symptoms. He has had these symptoms since grade school but stopped being treated. Stress at work is pretty much gone, other than the stress of the calls. The new supervisor is very supportive and he feels very well supported in his role. We will plan to keep Effexor dose the same. We discussed the addition of lisdexamfetamine (Vyvanse) to target ADHD symptoms. Educated on the risks, benefits and side effects including insomnia, headache, exacerbation of tics, nervousness, irritability, overstimulation, tremor, dizziness, anorexia, nausea, dry mouth, constipation, diarrhea, weight loss, dry mouth. Patient consents to treatment.  Catracho Bishop (Gallito's therapist) asked me about ordering a SAD lamp. Discussed this with Gallito and he declined needing at this time but will let me know if he feels it would be helpful.    (F33.1) Moderate episode of recurrent major depressive disorder (H)  Plan: venlafaxine (EFFEXOR XR) 150 MG 24 hr capsule Take 1 capsule (150 mg) by mouth daily.      (F41.9) Anxiety  Plan: venlafaxine (EFFEXOR XR) 150 MG 24 hr capsule Take 1 capsule (150 mg) by mouth daily.     (F90.2) ADHD (attention deficit hyperactivity disorder), combined type  Plan: lisdexamfetamine (VYVANSE) 10 MG capsule Take 1 capsule (10 mg) by mouth every morning.     Laboratory: None - TSH, Vit B12 and Vit D drawn from last appt were all WNL. Dr. Zapata did have some concerns about his liver and is following up on that. Would like to keep Effeor dose lower if possible for any concerns related to that as well. He will follow  "up with Dr. Zapata again the end of October.    Referrals: None    Follow Up: Return for follow up in 2 months          History of Present Illness     Gallito Doshi is a 24 year old male with a reported history of MDD and anxiety who is here today for follow up. Gallito attended the session alone. Gallito was last seen on 7/1/24 when we kept medications the same.  Struggling a little more with focus and concentration, especially now that depression and anxiety are a little more under control. 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 During training meetings he finds himself daydreaming.          Psychiatric Review of Symptoms     Mood/Depression: Feels like mood is pretty good. Did have a down   Endorses depressed mood, low energy, insomnia, poor concentration /memory, and overwhelmed.     Anxiety: New supervisor is wonderful, she is supportive about mental health.  Anxiety has been better, less anxious about being fired. Endorses excessive worry, social anxiety, and nervous/overwhelmed. Social anxiety - doesn't like big crowds, doesn't like talking to people he doesn't know, not the most outgoing person, introvert.      Panic Attacks: Denies panic attacks.      Sleep: averaging about 6-8 hours per night. A little more consistent.  still all over the place. 10-12 hours, when not sleeping up for 20+ hours, on edge     Appetite: Has been dieting since last visit with Dr. Zapata, trying to stay under certain calorie count.   not very good. Sometimes 1-2 times per day. Needs to have breakfast, wakes up with very bad GERD.     Concentration/Distractibility: video games it's fine, if other things it's kind of scattered. On the job he is very focused. Otherwise sort of \"scatterbrained\".  He was started on Adderall after [facility] hospitalization but then he didn't eat for 3-4 days. His grandma took his keys and said he couldn't leave until he " "ate.  He had difficulty in school with ADHD symptoms - was put on an IEP for issues with teachers and authority, couldn't concentrate, was diagnosed with oppositional defiance disorder. All of this occurred right after cousin's suicide. He quit taking the Adderall because he didn't like the way it made him feel.     Activity/Energy: kind of about the same. Forces himself to get up and go for a walk.  has some motivation, but difficult when working all the time     Current psychosocial stressors: financial hardship, limited social support, mental health symptoms, and occupational / vocational stress      PTSD: Endorses occasional intrusive memories, nightmares, and flashbacks. Rough calls, they come back for awhile and then they go away. No real big flashbacks; Not constant nightmares.     Substance Use: Seriously cut back on alcohol use. Maybe drinking a 6-pack on special occasions  No change. Current use includes: 12-pack a week (cut back)     Suicidal Ideation: Denies suicidal ideation or self-harm     Homicidal Ideation: Denies homicidal ideation     Therapy: Catracho seems to be helping         Past Medical/Surgical History     Medical diagnoses: History reviewed. No pertinent past medical history.    Surgical history:   Past Surgical History:   Procedure Laterality Date    LAPAROSCOPIC APPENDECTOMY N/A 7/24/2022    Procedure: APPENDECTOMY, LAPAROSCOPIC;  Surgeon: Kenny Zambrano DO;  Location: [facility]    SCLERAL BUCKLE Right 10/17/2019    Left eye done 03/2020          Medical Review of Systems     Allergies: Patient has no known allergies.          Vital Signs: /74 (Cuff Size: Adult Large)   Pulse 95   Temp 97.5  F (36.4  C) (Tympanic)   Ht 1.854 m (6' 1\")   Wt 115.7 kg (255 lb)   SpO2 98%   BMI 33.64 kg/m            Weight: 255 lbs 0 oz  BMI: Body mass index is 33.64 kg/m .    Physical Exam: Please refer to physical exam completed by primary care provider.    10 point review of systems is otherwise " negative unless noted above.           Mental Status Examination     Appearance:  awake, alert, adequately groomed, and appeared as age stated  Alertness:  alert  and oriented  Attitude:  cooperative  Behavior/Demeanor:  cooperative, pleasant, and calm, with good  eye contact.  Mood:  good and was congruent to speech content.  Affect:  appropriate and in normal range, mood congruent, intensity is normal, and full range  Speech:  normal and regular rate and rhythm  Psychomotor Behavior:  no evidence of tardive dyskinesia, dystonia, or tics and intact station, gait and muscle tone  Thought Process:  logical, linear, and goal oriented without any evidence of loose associations, flight of ideas, tangentiality, or circumstantiality.  Thought Content:  no evidence of suicidal ideation or homicidal ideation, no evidence of psychotic thought, no auditory hallucinations present, and no visual hallucinations present  Insight:  good  Judgment: good  Cognition:  time, person, and place  Attention Span and Concentration:  intact  Recent and Remote Memory:  intact  Language:  intact  Fund of Knowledge: appropriate  Muscle Strength and Tone: normal  Gait and Station: Normal     These cognitive functions grossly appear as described, but were not formally tested.         Labs     No results found for this or any previous visit (from the past 24 hour(s)).    Labs were reviewed, no concerns.         Medications                                                                  BOLD psych meds     I have reviewed this patient's current medications.    Current Outpatient Medications   Medication Sig Dispense Refill    venlafaxine (EFFEXOR XR) 150 MG 24 hr capsule Take 1 capsule (150 mg) by mouth daily 90 capsule 1     No current facility-administered medications for this visit.     Reviewed PDMP: N/A     Past medication trials:   Fluoxetine (19-20), venlafaxine (3/11/24-present)  Hydroxyzine (16-19)  Melatonin (16-present), Trazodone  (19)  Olanzapine ODT/Inj (16-22), Quetiapine (16-19)  Adderall (age 16)           PHQ-9 / ADRY-7                       Reviewed PHQ-9 and ADRY-7 screenings.         7/2/2024     9:26 AM 9/9/2024    11:14 AM 9/26/2024     7:26 AM   PHQ-9 SCORE   PHQ-9 Total Score 2 3 0         7/2/2024     9:01 AM 9/9/2024    11:14 AM 9/26/2024     7:26 AM   ADRY-7 SCORE   Total Score 6 1 0              36 minutes spent by me on the date of the encounter doing chart review, review of outside records, review of test results, interpretation of tests, patient visit, and documentation     AMMY Lucero, PMHNP-BC    Patient was instructed to monitor for worsening symptoms and side effects from medications. If there is a serious deterioration of mood or any dangerous-type behaviors (suicidal/homicidal ideations) patient is advised to call 911 or report directly to the nearest Emergency Department.     Treatment Risk Statement: The risks, benefits, alternatives and potential adverse effects have been explained and are understood by the patient. The patient agrees to the treatment plan with the ability to do so. The patient knows to call the clinic for any problems or access emergency care if needed.

## 2024-09-30 NOTE — TELEPHONE ENCOUNTER
The patient was notified of this. He will call with any issues and I will check in with him in 1 month.

## 2024-10-21 NOTE — PROGRESS NOTES
"  Assessment & Plan     Moderate episode of recurrent major depressive disorder (H) / Anxiety  Follows with Analy with last visit 9/30/2024. Note reviewed. Follow up in two months which is scheduled for 11/25/2024  Ritalin is helping with concentration and sleep, but some issues with getting \"side tracked\"  - message sent to Analy, who will reach out to Gallito   - increased ritalin to 20mg XR every day   - c/w effexor     Transaminitis  Improving   - Comprehensive metabolic panel (BMP + Alb, Alk Phos, ALT, AST, Total. Bili, TP); Future  - Ferritin; Future  - Iron and iron binding capacity; Future  - CBC with platelets; Future  - Comprehensive metabolic panel (BMP + Alb, Alk Phos, ALT, AST, Total. Bili, TP)  - Ferritin  - Iron and iron binding capacity  - CBC with platelets    FHx: hemochromatosis  Ferritin improving with alcohol cessation. Iron wnl. Will continue to monitor, but less suspicious of hemochromatosis  - Comprehensive metabolic panel (BMP + Alb, Alk Phos, ALT, AST, Total. Bili, TP); Future  - Ferritin; Future  - Iron and iron binding capacity; Future  - CBC with platelets; Future  - Comprehensive metabolic panel (BMP + Alb, Alk Phos, ALT, AST, Total. Bili, TP)  - Ferritin  - Iron and iron binding capacity  - CBC with platelets    The longitudinal plan of care for the diagnosis(es)/condition(s) as documented were addressed during this visit. Due to the added complexity in care, I will continue to support Gallito in the subsequent management and with ongoing continuity of care.      BMI  Estimated body mass index is 32.83 kg/m  as calculated from the following:    Height as of this encounter: 1.854 m (6' 1\").    Weight as of this encounter: 112.9 kg (248 lb 12.8 oz).   Weight management plan: weight loss with improved mood, diet, and alcohol cessation       See Patient Instructions    Next visit 7/21/2025 for PE    Ana   Gallito is a 24 year old, presenting for the following health " issues:  Anxiety and Depression        10/24/2024     8:17 AM   Additional Questions   Roomed by Isabel Sanchez   Accompanied by self     History of Present Illness       Mental Health Follow-up:  Patient presents to follow-up on Depression & Anxiety.Patient's depression since last visit has been:  Good  The patient is not having other symptoms associated with depression.  Patient's anxiety since last visit has been:  Good  The patient is not having other symptoms associated with anxiety.  Any significant life events: No  Patient is not feeling anxious or having panic attacks.  Patient has no concerns about alcohol or drug use.    He eats 2-3 servings of fruits and vegetables daily.He consumes 0 sweetened beverage(s) daily.He exercises with enough effort to increase his heart rate 20 to 29 minutes per day.  He exercises with enough effort to increase his heart rate 4 days per week.   He is taking medications regularly.       Depression and Anxiety   How are you doing with your depression since your last visit? Improved   How are you doing with your anxiety since your last visit?  Improved   Are you having other symptoms that might be associated with depression or anxiety? No  Have you had a significant life event? No   Do you have any concerns with your use of alcohol or other drugs? No    Follows with Analy with last visit 9/30/2024. Note reviewed. Follow up in two months which is scheduled for 11/25/2024  - c/w effexor  - added vyvanse -> too expensive, trying ritalin    - ritalin is helping, but still some side tracking issues   - sleep is better     Follows with Catracho for therapy     Social History     Tobacco Use    Smoking status: Never    Smokeless tobacco: Never   Substance Use Topics    Alcohol use: Yes     Comment: social    Drug use: Never         9/9/2024    11:14 AM 9/26/2024     7:26 AM 10/24/2024     7:58 AM   PHQ   PHQ-9 Total Score 3 0 0    Q9: Thoughts of better off dead/self-harm past 2 weeks Not  "at all Not at all Not at all        Patient-reported         9/9/2024    11:14 AM 9/26/2024     7:26 AM 10/24/2024     7:58 AM   ADRY-7 SCORE   Total Score   0 (minimal anxiety)   Total Score 1 0 0        Patient-reported       # Transaminitis   -  alcohol, decreased and then sober since mid September   - feels better       Wt Readings from Last 4 Encounters:   10/24/24 112.9 kg (248 lb 12.8 oz)   09/30/24 115.7 kg (255 lb)   07/19/24 126.9 kg (279 lb 11.2 oz)   07/01/24 117.9 kg (260 lb)           # Immunization: declines flu and covid       Review of Systems  Constitutional, HEENT, cardiovascular, pulmonary, gi and gu systems are negative, except as otherwise noted.      Objective    /80   Pulse 88   Temp 97.9  F (36.6  C) (Tympanic)   Resp 17   Ht 1.854 m (6' 1\")   Wt 112.9 kg (248 lb 12.8 oz)   SpO2 97%   BMI 32.83 kg/m    Body mass index is 32.83 kg/m .  Physical Exam  Constitutional:       General: He is not in acute distress.     Appearance: He is not ill-appearing.   Cardiovascular:      Rate and Rhythm: Normal rate and regular rhythm.      Heart sounds: No murmur heard.  Pulmonary:      Effort: Pulmonary effort is normal. No respiratory distress.      Breath sounds: No wheezing or rales.   Neurological:      Mental Status: He is alert.   Psychiatric:         Mood and Affect: Mood normal.          Results for orders placed or performed in visit on 10/24/24 (from the past 24 hours)   Comprehensive metabolic panel (BMP + Alb, Alk Phos, ALT, AST, Total. Bili, TP)   Result Value Ref Range    Sodium 140 135 - 145 mmol/L    Potassium 4.2 3.4 - 5.3 mmol/L    Carbon Dioxide (CO2) 22 22 - 29 mmol/L    Anion Gap 16 (H) 7 - 15 mmol/L    Urea Nitrogen 11.9 6.0 - 20.0 mg/dL    Creatinine 0.99 0.67 - 1.17 mg/dL    GFR Estimate >90 >60 mL/min/1.73m2    Calcium 9.8 8.8 - 10.4 mg/dL    Chloride 102 98 - 107 mmol/L    Glucose 85 70 - 99 mg/dL    Alkaline Phosphatase 126 40 - 150 U/L    AST 37 0 - 45 U/L    ALT 80 " (H) 0 - 70 U/L    Protein Total 8.2 6.4 - 8.3 g/dL    Albumin 5.1 3.5 - 5.2 g/dL    Bilirubin Total 0.5 <=1.2 mg/dL   Ferritin   Result Value Ref Range    Ferritin 437 (H) 31 - 409 ng/mL   Iron and iron binding capacity   Result Value Ref Range    Iron 142 61 - 157 ug/dL    Iron Binding Capacity 334 240 - 430 ug/dL    Iron Sat Index 43 15 - 46 %   CBC with platelets   Result Value Ref Range    WBC Count 6.1 4.0 - 11.0 10e3/uL    RBC Count 5.01 4.40 - 5.90 10e6/uL    Hemoglobin 15.5 13.3 - 17.7 g/dL    Hematocrit 44.9 40.0 - 53.0 %    MCV 90 78 - 100 fL    MCH 30.9 26.5 - 33.0 pg    MCHC 34.5 31.5 - 36.5 g/dL    RDW 11.3 10.0 - 15.0 %    Platelet Count 250 150 - 450 10e3/uL           Signed Electronically by: Nelly Zapata MD

## 2024-10-24 ENCOUNTER — APPOINTMENT (OUTPATIENT)
Dept: LAB | Facility: OTHER | Age: 24
End: 2024-10-24
Attending: FAMILY MEDICINE
Payer: COMMERCIAL

## 2024-10-24 ENCOUNTER — OFFICE VISIT (OUTPATIENT)
Dept: FAMILY MEDICINE | Facility: OTHER | Age: 24
End: 2024-10-24
Attending: FAMILY MEDICINE
Payer: COMMERCIAL

## 2024-10-24 VITALS
OXYGEN SATURATION: 97 % | TEMPERATURE: 97.9 F | RESPIRATION RATE: 17 BRPM | HEART RATE: 88 BPM | HEIGHT: 73 IN | BODY MASS INDEX: 32.97 KG/M2 | DIASTOLIC BLOOD PRESSURE: 80 MMHG | WEIGHT: 248.8 LBS | SYSTOLIC BLOOD PRESSURE: 110 MMHG

## 2024-10-24 DIAGNOSIS — R74.01 TRANSAMINITIS: ICD-10-CM

## 2024-10-24 DIAGNOSIS — F41.9 ANXIETY: ICD-10-CM

## 2024-10-24 DIAGNOSIS — F33.1 MODERATE EPISODE OF RECURRENT MAJOR DEPRESSIVE DISORDER (H): Primary | ICD-10-CM

## 2024-10-24 DIAGNOSIS — F90.2 ADHD (ATTENTION DEFICIT HYPERACTIVITY DISORDER), COMBINED TYPE: ICD-10-CM

## 2024-10-24 DIAGNOSIS — Z83.49 FHX: HEMOCHROMATOSIS: ICD-10-CM

## 2024-10-24 LAB
ALBUMIN SERPL BCG-MCNC: 5.1 G/DL (ref 3.5–5.2)
ALP SERPL-CCNC: 126 U/L (ref 40–150)
ALT SERPL W P-5'-P-CCNC: 80 U/L (ref 0–70)
ANION GAP SERPL CALCULATED.3IONS-SCNC: 16 MMOL/L (ref 7–15)
AST SERPL W P-5'-P-CCNC: 37 U/L (ref 0–45)
BILIRUB SERPL-MCNC: 0.5 MG/DL
BUN SERPL-MCNC: 11.9 MG/DL (ref 6–20)
CALCIUM SERPL-MCNC: 9.8 MG/DL (ref 8.8–10.4)
CHLORIDE SERPL-SCNC: 102 MMOL/L (ref 98–107)
CREAT SERPL-MCNC: 0.99 MG/DL (ref 0.67–1.17)
EGFRCR SERPLBLD CKD-EPI 2021: >90 ML/MIN/1.73M2
ERYTHROCYTE [DISTWIDTH] IN BLOOD BY AUTOMATED COUNT: 11.3 % (ref 10–15)
FERRITIN SERPL-MCNC: 437 NG/ML (ref 31–409)
GLUCOSE SERPL-MCNC: 85 MG/DL (ref 70–99)
HCO3 SERPL-SCNC: 22 MMOL/L (ref 22–29)
HCT VFR BLD AUTO: 44.9 % (ref 40–53)
HGB BLD-MCNC: 15.5 G/DL (ref 13.3–17.7)
IRON BINDING CAPACITY (ROCHE): 334 UG/DL (ref 240–430)
IRON SATN MFR SERPL: 43 % (ref 15–46)
IRON SERPL-MCNC: 142 UG/DL (ref 61–157)
MCH RBC QN AUTO: 30.9 PG (ref 26.5–33)
MCHC RBC AUTO-ENTMCNC: 34.5 G/DL (ref 31.5–36.5)
MCV RBC AUTO: 90 FL (ref 78–100)
PLATELET # BLD AUTO: 250 10E3/UL (ref 150–450)
POTASSIUM SERPL-SCNC: 4.2 MMOL/L (ref 3.4–5.3)
PROT SERPL-MCNC: 8.2 G/DL (ref 6.4–8.3)
RBC # BLD AUTO: 5.01 10E6/UL (ref 4.4–5.9)
SODIUM SERPL-SCNC: 140 MMOL/L (ref 135–145)
WBC # BLD AUTO: 6.1 10E3/UL (ref 4–11)

## 2024-10-24 PROCEDURE — 80053 COMPREHEN METABOLIC PANEL: CPT | Performed by: FAMILY MEDICINE

## 2024-10-24 PROCEDURE — 83540 ASSAY OF IRON: CPT | Performed by: FAMILY MEDICINE

## 2024-10-24 PROCEDURE — G2211 COMPLEX E/M VISIT ADD ON: HCPCS | Performed by: FAMILY MEDICINE

## 2024-10-24 PROCEDURE — 83550 IRON BINDING TEST: CPT | Performed by: FAMILY MEDICINE

## 2024-10-24 PROCEDURE — 85027 COMPLETE CBC AUTOMATED: CPT | Performed by: FAMILY MEDICINE

## 2024-10-24 PROCEDURE — 99213 OFFICE O/P EST LOW 20 MIN: CPT | Performed by: FAMILY MEDICINE

## 2024-10-24 PROCEDURE — 82728 ASSAY OF FERRITIN: CPT | Performed by: FAMILY MEDICINE

## 2024-10-24 RX ORDER — METHYLPHENIDATE HYDROCHLORIDE 20 MG/1
20 CAPSULE, EXTENDED RELEASE ORAL EVERY MORNING
Qty: 30 CAPSULE | Refills: 0 | Status: SHIPPED | OUTPATIENT
Start: 2024-10-24

## 2024-10-24 ASSESSMENT — ANXIETY QUESTIONNAIRES
GAD7 TOTAL SCORE: 0
4. TROUBLE RELAXING: NOT AT ALL
3. WORRYING TOO MUCH ABOUT DIFFERENT THINGS: NOT AT ALL
5. BEING SO RESTLESS THAT IT IS HARD TO SIT STILL: NOT AT ALL
6. BECOMING EASILY ANNOYED OR IRRITABLE: NOT AT ALL
1. FEELING NERVOUS, ANXIOUS, OR ON EDGE: NOT AT ALL
IF YOU CHECKED OFF ANY PROBLEMS ON THIS QUESTIONNAIRE, HOW DIFFICULT HAVE THESE PROBLEMS MADE IT FOR YOU TO DO YOUR WORK, TAKE CARE OF THINGS AT HOME, OR GET ALONG WITH OTHER PEOPLE: NOT DIFFICULT AT ALL
8. IF YOU CHECKED OFF ANY PROBLEMS, HOW DIFFICULT HAVE THESE MADE IT FOR YOU TO DO YOUR WORK, TAKE CARE OF THINGS AT HOME, OR GET ALONG WITH OTHER PEOPLE?: NOT DIFFICULT AT ALL
GAD7 TOTAL SCORE: 0
GAD7 TOTAL SCORE: 0
7. FEELING AFRAID AS IF SOMETHING AWFUL MIGHT HAPPEN: NOT AT ALL
2. NOT BEING ABLE TO STOP OR CONTROL WORRYING: NOT AT ALL
7. FEELING AFRAID AS IF SOMETHING AWFUL MIGHT HAPPEN: NOT AT ALL

## 2024-10-24 ASSESSMENT — PATIENT HEALTH QUESTIONNAIRE - PHQ9
SUM OF ALL RESPONSES TO PHQ QUESTIONS 1-9: 0
10. IF YOU CHECKED OFF ANY PROBLEMS, HOW DIFFICULT HAVE THESE PROBLEMS MADE IT FOR YOU TO DO YOUR WORK, TAKE CARE OF THINGS AT HOME, OR GET ALONG WITH OTHER PEOPLE: NOT DIFFICULT AT ALL
SUM OF ALL RESPONSES TO PHQ QUESTIONS 1-9: 0

## 2024-10-24 ASSESSMENT — PAIN SCALES - GENERAL: PAINLEVEL_OUTOF10: NO PAIN (0)

## 2024-10-24 NOTE — TELEPHONE ENCOUNTER
I spoke with the patient and he is on board with trying the 20mg. He will use his 10mg tabs and  the 20mg tabs. He will call with any questions/concerns.   
Ingrid - please call Gallito and make sure he's on board with increasing Ritalin to 20 mg. He can double up his 10's and then a new script with 20 mg tabs will be ready at [facility].    Let me know when you get a hold of him and then I will send the updated script.  
Received the following message from Dr. Zapata:    Gallito came to clinic today for a routine visit. He reports his ritalin is doing well, but still gets side track occasionally. Sleep has improved. Sober from alcohol since mid September    What are  your thoughts about increasing his ritalin?  _________________________________________________    Plan to increase Ritalin LA to 20 mg daily.  
Yes

## 2024-10-25 LAB
IGG SERPL-MCNC: 980 MG/DL (ref 610–1616)
MITOCHONDRIA M2 IGG SER-ACNC: <1 U/ML
TTG IGA SER-ACNC: 0.7 U/ML
TTG IGG SER-ACNC: <0.6 U/ML

## 2024-10-26 LAB — SMA IGG SER-ACNC: 5 UNITS

## 2024-10-28 LAB
A1AT SERPL-MCNC: 117 MG/DL (ref 90–200)
CERULOPLASMIN SERPL-MCNC: 34 MG/DL (ref 20–60)

## 2024-10-29 ENCOUNTER — OFFICE VISIT (OUTPATIENT)
Dept: PSYCHOLOGY | Facility: OTHER | Age: 24
End: 2024-10-29
Attending: COUNSELOR
Payer: COMMERCIAL

## 2024-10-29 DIAGNOSIS — F41.9 ANXIETY AND DEPRESSION: Primary | ICD-10-CM

## 2024-10-29 DIAGNOSIS — F32.A ANXIETY AND DEPRESSION: Primary | ICD-10-CM

## 2024-10-29 PROCEDURE — 90834 PSYTX W PT 45 MINUTES: CPT | Performed by: COUNSELOR

## 2024-10-29 NOTE — PROGRESS NOTES
Progress Note    Client Name: Gallito Doshi  Date: October 29, 2024         Service Type: Individual      Session Start Time: 0135  Session End Time: 0220      Session Length: 55          Attendees: Client attended alone    PHQ-9 :       9/9/2024    11:14 AM 9/26/2024     7:26 AM 10/24/2024     7:58 AM   PHQ-9 SCORE   PHQ-9 Total Score MyChart   0   PHQ-9 Total Score 3 0 0        Patient-reported      ADRY-7 :        9/9/2024    11:14 AM 9/26/2024     7:26 AM 10/24/2024     7:58 AM   ADRY-7 SCORE   Total Score   0 (minimal anxiety)   Total Score 1 0 0        Patient-reported           DATA  Gallito Doshi presented as O x 4,coherent,relevant and pleasant.His memory.was intact .The pt exhibited no overt signs of psychotic processes. There was no report of symptoms indicating derailment of thought  such as  A/V hallucinations.The pt denied S/H ideations and as a consequence  there was immediate no need  for a  safety plan.The patient's insight and judgement were within a range acceptable for safety. His  fund of information appears to be within a normal range..    The theme of this session was the reported significant improvement of Gallito well-being.  He says that he has stopped drinking and as a consequence,he has lost about 47.lbs/. He said that he believes that the medication that josh has prescribed by Analy Galdamez has helped. He believes that a modest increase in something might help. His major concern right now is the cost. He was encouraged  to have a conversation with Analy about alternatives.    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 Discussed this as anxiety.         Progress Since Last Session (Related to Symptoms / Goals / Homework):   Symptoms: Improving      Homework: Completed in session     Current / Ongoing Stressors and Concerns:   Concern about a county take over of his job in about two years     Treatment Objective(s)  Addressed in This Session:         Alleviate anxiety     Intervention:   CBT     Response to Interventions:   Sees the benefit of therapy     ASSESSMENT: Current Emotional / Mental Status (status of significant symptoms):   Risk status (Self / Other harm or suicidal ideation)   Client denies current fears or concerns for personal safety.   Client denies current or recent suicidal ideation or behaviors.   Client denies current or recent homicidal ideation or behaviors.   Client denies current or recent self injurious behavior or ideation.   Client denies other safety concerns.   Recommended that patient call 911 or go to the local ED should there be a change in any of these risk factors     Appearance:   Appropriate    Eye Contact:   Fair    Psychomotor Behavior: Normal    Attitude:   Cooperative    Orientation:   All   Speech    Rate / Production: Normal     Volume:  Normal    Mood:    Anxious  Dysphoric   Affect:    Mood congruent   Thought Content:  Clear    Thought Form:  Coherent  Logical    Insight:    Good         Medication Compliance:   Yes     Changes in Health Issues:   None reported     Chemical Use Review:   Substance Use: Chemical use reviewed, no active concerns identified      Tobacco Use: No current tobacco use.       Collateral Reports Completed:   Not Applicable    PLAN: (Client Tasks / Therapist Tasks / Other)  Step pt down to every 3weeks    NAN Barrios  The author of this note documented a reason for not sharing it with the patient.

## 2024-11-16 ENCOUNTER — MYC REFILL (OUTPATIENT)
Dept: PSYCHIATRY | Facility: OTHER | Age: 24
End: 2024-11-16

## 2024-11-16 DIAGNOSIS — F90.2 ADHD (ATTENTION DEFICIT HYPERACTIVITY DISORDER), COMBINED TYPE: ICD-10-CM

## 2024-11-18 RX ORDER — METHYLPHENIDATE HYDROCHLORIDE 20 MG/1
20 CAPSULE, EXTENDED RELEASE ORAL EVERY MORNING
Qty: 30 CAPSULE | Refills: 0 | Status: SHIPPED | OUTPATIENT
Start: 2024-11-21

## 2024-11-18 NOTE — TELEPHONE ENCOUNTER
Ritalin LA      Last Written Prescription Date:  10/24/24  Last Fill Quantity: 30,   # refills: 0  Last Office Visit: 9/30/24  Future Office visit:    Next 5 appointments (look out 90 days)      Nov 19, 2024 9:00 AM  (Arrive by 8:45 AM)  Return Visit with NAN Barrios  [facility] [place] ([facility] [place] ) [address]  [place] [address]  [phone]     Nov 25, 2024 8:30 AM  (Arrive by 8:15 AM)  Return Visit with AMMY [facility] [place] ([facility] [place] )  Arrive at: [facility] [address]  [place] [address]  [phone]     Dec 10, 2024 9:00 AM  (Arrive by 8:45 AM)  Return Visit with NAN Barrios  [facility] [place] ([facility] [place] ) [address]  [place] [address]  [phone]     Dec 31, 2024 9:00 AM  (Arrive by 8:45 AM)  Return Visit with NAN Barrios  [facility] [place] ([facility] [place] ) [address]  [place] [address]  [phone]             Routing refill request to provider for review/approval because:

## 2024-11-25 ENCOUNTER — OFFICE VISIT (OUTPATIENT)
Dept: PSYCHIATRY | Facility: OTHER | Age: 24
End: 2024-11-25
Payer: COMMERCIAL

## 2024-11-25 VITALS
HEART RATE: 101 BPM | OXYGEN SATURATION: 98 % | WEIGHT: 234 LBS | DIASTOLIC BLOOD PRESSURE: 74 MMHG | SYSTOLIC BLOOD PRESSURE: 118 MMHG | BODY MASS INDEX: 31.01 KG/M2 | HEIGHT: 73 IN | TEMPERATURE: 97.5 F

## 2024-11-25 DIAGNOSIS — F90.2 ADHD (ATTENTION DEFICIT HYPERACTIVITY DISORDER), COMBINED TYPE: ICD-10-CM

## 2024-11-25 DIAGNOSIS — F33.1 MODERATE EPISODE OF RECURRENT MAJOR DEPRESSIVE DISORDER (H): ICD-10-CM

## 2024-11-25 DIAGNOSIS — F41.9 ANXIETY: ICD-10-CM

## 2024-11-25 PROCEDURE — 99214 OFFICE O/P EST MOD 30 MIN: CPT

## 2024-11-25 ASSESSMENT — PAIN SCALES - GENERAL: PAINLEVEL_OUTOF10: NO PAIN (0)

## 2024-11-25 NOTE — PROGRESS NOTES
"          OUTPATIENT PSYCHIATRY: FOLLOW UP ASSESSMENT (ADULT)     Identifying Information:  Gallito Doshi MRN# [id number]   Age: 24 year old YOB: 2000     Initial Psychiatry Visit Date: 5/9/24        Assessment & Plan     This is a 24 year old male patient who is seen today for follow up. Gallito is doing very well. Mood and anxiety are well controlled at current dose of Effexor XR, and ADHD symptoms are well controlled with Ritalin LA 20 mg daily. Gallito also feels the Ritalin is having positive effects on his mood and energy level. Will continue without any medication changes.    (F33.1) Moderate episode of recurrent major depressive disorder (H)  Plan: venlafaxine (EFFEXOR XR) 150 MG 24 hr capsule Take 1 capsule (150 mg) by mouth daily.      (F41.9) Anxiety  Plan: venlafaxine (EFFEXOR XR) 150 MG 24 hr capsule Take 1 capsule (150 mg) by mouth daily.      (F90.2) ADHD (attention deficit hyperactivity disorder), combined type  Plan: methylphenidate (RITALIN LA) 20 MG 24 hr capsule Take 1 capsule (20 mg) by mouth every morning.      Laboratory: None    Referrals: None    Follow Up: Return for follow up in 3 months          History of Present Illness     Gallito Doshi is a 24 year old male with a reported history of MDD, ADRY, ADHD who is here today for follow up. Gallito attended the session alone. Gallito was last seen on 9/30/24 when we added Vyvanse to target ADHD symptoms, however, the Vyvanse was too expensive so we ended up switching to Ritalin LA 10 mg daily. In the interim, it was increased by Dr. Zapata after she discussed some continued concerns at her follow up with him on 10/24 - she reached out about increasing it, he is currently taking 20 mg daily.  \"Doing very well\"  Increased Ritalin LA - even helping mood, working out more, able to get out of bed, not feeling as down in the dump.   Work is going good; working normal shifts about 48-72 hours per week         Psychiatric " "Review of Symptoms     Mood/Depression: \"very good\". Denies depressed mood, low energy, insomnia, poor concentration /memory, or feeling overwhelmed.     Anxiety: Denies any anxiety. Occasionally some social anxiety, but not often.     Panic Attacks: Denies panic attacks.      Sleep: averaging about 6-8 hours per night. More consistent.     Appetite: Has been dieting since last visit with Dr. Zapata, trying to stay under certain calorie count. Has been losing weight. 7/1/24 260 lb - 11/25/24 234 lb. Goal is around 200 lb.  He doesn't feel like the ritalin is having too much to do with the weight loss. He is counting calories, still eating, just making better choices.     Concentration/Distractibility: \"So much better\". If writing a report, he can sit down and do it instead of doing a bit, stopping, etc. When playing games, he is much more focsed and can get the numbers done.     Activity/Energy: \"a lot better\", not groggy or tired during the day. Not napping during the day.      Current psychosocial stressors: denies any stressors     PTSD: Improving - No real big flashbacks; Not constant nightmares.     Substance Use: Completely quit drinking mid-September and feeling better overall. Not having that \"sludgy\" feeling he was having in his stomach, labs are better.     Suicidal Ideation: Denies suicidal ideation or self-harm     Homicidal Ideation: Denies homicidal ideation     Therapy: Catracho, seems to be helping         Past Medical/Surgical History     Medical diagnoses: History reviewed. No pertinent past medical history.    Surgical history:   Past Surgical History:   Procedure Laterality Date    LAPAROSCOPIC APPENDECTOMY N/A 7/24/2022    Procedure: APPENDECTOMY, LAPAROSCOPIC;  Surgeon: Kenny Zambrano DO;  Location: [facility] OR    SCLERAL BUCKLE Right 10/17/2019    Left eye done 03/2020          Medical Review of Systems     Allergies: Patient has no known allergies.          Vital Signs: Wt 106.1 kg (234 lb)   BMI " 30.87 kg/m            Weight: 234 lbs 0 oz  BMI: Body mass index is 30.87 kg/m .    Physical Exam: Please refer to physical exam completed by primary care provider.    10 point review of systems is otherwise negative unless noted above.           Mental Status Examination     Appearance:  awake, alert, adequately groomed, and appeared as age stated  Alertness:  alert  and oriented  Attitude:  cooperative  Behavior/Demeanor:  cooperative, pleasant, and calm, with good  eye contact.  Mood:  good and was congruent to speech content.  Affect:  appropriate and in normal range, mood congruent, intensity is normal, and full range  Speech:  normal and regular rate and rhythm  Psychomotor Behavior:  no evidence of tardive dyskinesia, dystonia, or tics and intact station, gait and muscle tone  Thought Process:  logical, linear, and goal oriented without any evidence of loose associations, flight of ideas, tangentiality, or circumstantiality.  Thought Content:  no evidence of suicidal ideation or homicidal ideation, no evidence of psychotic thought, no auditory hallucinations present, and no visual hallucinations present  Insight:  good  Judgment: good  Cognition:  time, person, and place  Attention Span and Concentration:  intact  Recent and Remote Memory:  intact  Language:  intact  Fund of Knowledge: appropriate  Muscle Strength and Tone: normal  Gait and Station: Normal     These cognitive functions grossly appear as described, but were not formally tested.         Labs     No results found for this or any previous visit (from the past 24 hours).    Labs were reviewed, no concerns.         Medications                                                                  BOLD psych meds     I have reviewed this patient's current medications.    Current Outpatient Medications   Medication Sig Dispense Refill    methylphenidate (RITALIN LA) 20 MG 24 hr capsule Take 1 capsule (20 mg) by mouth every morning. 30 capsule 0     venlafaxine (EFFEXOR XR) 150 MG 24 hr capsule Take 1 capsule (150 mg) by mouth daily. 90 capsule 1     No current facility-administered medications for this visit.     Reviewed PDMP: Demonstrates appropriate use     Past medication trials:   Fluoxetine (19-20), venlafaxine (3/11/24-present)  Hydroxyzine (16-19)  Melatonin (16-present), Trazodone (19)  Olanzapine ODT/Inj (16-22), Quetiapine (16-19)  Adderall (age 16)            PHQ-9 / ADRY-7                       Reviewed PHQ-9 and ADRY-7 screenings.         9/9/2024    11:14 AM 9/26/2024     7:26 AM 10/24/2024     7:58 AM   PHQ-9 SCORE   PHQ-9 Total Score MyChart   0   PHQ-9 Total Score 3 0 0        Patient-reported         9/9/2024    11:14 AM 9/26/2024     7:26 AM 10/24/2024     7:58 AM   ADRY-7 SCORE   Total Score   0 (minimal anxiety)   Total Score 1 0 0        Patient-reported              *** minutes spent by me on the date of the encounter doing chart review, patient visit, and documentation     AMMY Lucero, [facility]-BC    Patient was instructed to monitor for worsening symptoms and side effects from medications. If there is a serious deterioration of mood or any dangerous-type behaviors (suicidal/homicidal ideations) patient is advised to call 911 or report directly to the nearest Emergency Department.     Treatment Risk Statement: The risks, benefits, alternatives and potential adverse effects have been explained and are understood by the patient. The patient agrees to the treatment plan with the ability to do so. The patient knows to call the clinic for any problems or access emergency care if needed.

## 2024-11-25 NOTE — PATIENT INSTRUCTIONS
Thank you for allowing Analy Serrano DNP, APRN to participate in your care.  If you have a scheduling or an appointment question please contact our scheduling department at their direct line [phone].   ALL nursing questions or concerns can be directed to the psychiatry nurses at [phone] or [phone]

## 2024-12-03 RX ORDER — METHYLPHENIDATE HYDROCHLORIDE 20 MG/1
20 CAPSULE, EXTENDED RELEASE ORAL EVERY MORNING
Qty: 30 CAPSULE | Refills: 0 | Status: SHIPPED | OUTPATIENT
Start: 2024-12-17 | End: 2025-01-16

## 2024-12-03 RX ORDER — METHYLPHENIDATE HYDROCHLORIDE 20 MG/1
20 CAPSULE, EXTENDED RELEASE ORAL EVERY MORNING
Qty: 30 CAPSULE | Refills: 0 | Status: SHIPPED | OUTPATIENT
Start: 2025-01-14 | End: 2025-02-13

## 2024-12-03 RX ORDER — METHYLPHENIDATE HYDROCHLORIDE 20 MG/1
20 CAPSULE, EXTENDED RELEASE ORAL EVERY MORNING
Qty: 30 CAPSULE | Refills: 0 | Status: SHIPPED | OUTPATIENT
Start: 2025-02-11 | End: 2025-03-13

## 2024-12-10 ENCOUNTER — OFFICE VISIT (OUTPATIENT)
Dept: PSYCHOLOGY | Facility: OTHER | Age: 24
End: 2024-12-10
Attending: COUNSELOR
Payer: COMMERCIAL

## 2024-12-10 DIAGNOSIS — F41.9 ANXIETY: Primary | ICD-10-CM

## 2024-12-10 PROCEDURE — 90837 PSYTX W PT 60 MINUTES: CPT | Performed by: COUNSELOR

## 2024-12-16 NOTE — PROGRESS NOTES
Progress Note    Client Name: Gallito Doshi  Date: December 10, 2024         Service Type: Individual      Session Start Time: 0900  Session End Time: 0955      Session Length: 55        Attendees: Client attended alone       PHQ-9 :       9/9/2024    11:14 AM 9/26/2024     7:26 AM 10/24/2024     7:58 AM   PHQ-9 SCORE   PHQ-9 Total Score MyChart   0   PHQ-9 Total Score 3 0 0        Patient-reported      ADRY-7 :        9/9/2024    11:14 AM 9/26/2024     7:26 AM 10/24/2024     7:58 AM   ADRY-7 SCORE   Total Score   0 (minimal anxiety)   Total Score 1 0 0        Patient-reported           DATA     Gallito Doshi presented as O x 4,coherent,relevant and pleasant. His memory.was intact .The pt exhibited no overt signs of psychotic processes. There was no report of symptoms indicating derailment of thought  such as  A/V hallucinations.The pt denied S/H ideations and as a consequence  there was immediate no need  for a  safety plan.The patient's insight and judgement were within a range acceptable for safety.   His fund of information appears to be within a normal range. The pt's appetite and sleep were within an acceptable.range.    Gallito reports that he is taking meds as prescribed. The medication appear to be helping him.He will speak to Analy about a possible med adjustment.       Progress Since Last Session (Related to Symptoms / Goals / Homework):   Symptoms: Improving      Homework: Completed in session     Current / Ongoing Stressors and Concerns:   Managing sxs of his ADHD     Treatment Objective(s) Addressed in This Session:   Manage symptoms     Intervention:   Psychoeducation     Response to Interventions:   Receptive to ideas     ASSESSMENT: Current Emotional / Mental Status (status of significant symptoms):   Risk status (Self / Other harm or suicidal ideation)   Client denies current fears or concerns for personal safety.   Client denies current or recent  suicidal ideation or behaviors.   Client denies current or recent homicidal ideation or behaviors.   Client denies current or recent self injurious behavior or ideation.   Client denies other safety concerns.   Recommended that patient call 911 or go to the local ED should there be a change in any of these risk factors     Appearance:   Appropriate    Eye Contact:   Good    Psychomotor Behavior: Normal    Attitude:   Cooperative    Orientation:   All   Speech    Rate / Production: Normal     Volume:  Normal    Mood:               Anxious and mildly dysphoric   Affect:    Appropriate    Thought Content:  Clear    Thought Form:  Coherent    Insight:    Good         Medication Compliance:   Yes     Changes in Health Issues:   None reported     Chemical Use Review:   Substance Use: Chemical use reviewed, no active concerns identified      Tobacco Use: No current tobacco use.       Collateral Reports Completed:   Not Applicable    PLAN: (Client Tasks / Therapist Tasks / Other)  Move the pt out to every three weeks    NAN Barrios   The author of this note documented a reason for not sharing it with the patient.

## 2024-12-27 ENCOUNTER — OFFICE VISIT (OUTPATIENT)
Dept: PSYCHIATRY | Facility: OTHER | Age: 24
End: 2024-12-27
Payer: COMMERCIAL

## 2024-12-27 VITALS
WEIGHT: 228 LBS | BODY MASS INDEX: 30.22 KG/M2 | SYSTOLIC BLOOD PRESSURE: 126 MMHG | HEIGHT: 73 IN | DIASTOLIC BLOOD PRESSURE: 74 MMHG | TEMPERATURE: 97.1 F | HEART RATE: 77 BPM | OXYGEN SATURATION: 98 %

## 2024-12-27 DIAGNOSIS — F33.1 MODERATE EPISODE OF RECURRENT MAJOR DEPRESSIVE DISORDER (H): ICD-10-CM

## 2024-12-27 DIAGNOSIS — F41.9 ANXIETY: ICD-10-CM

## 2024-12-27 DIAGNOSIS — F90.2 ADHD (ATTENTION DEFICIT HYPERACTIVITY DISORDER), COMBINED TYPE: ICD-10-CM

## 2024-12-27 DIAGNOSIS — G47.00 PERSISTENT DISORDER OF INITIATING OR MAINTAINING SLEEP: Primary | ICD-10-CM

## 2024-12-27 PROCEDURE — 99213 OFFICE O/P EST LOW 20 MIN: CPT

## 2024-12-27 RX ORDER — VENLAFAXINE HYDROCHLORIDE 75 MG/1
75 CAPSULE, EXTENDED RELEASE ORAL DAILY
Qty: 30 CAPSULE | Refills: 2 | Status: SHIPPED | OUTPATIENT
Start: 2024-12-27

## 2024-12-27 RX ORDER — TRAZODONE HYDROCHLORIDE 50 MG/1
25-50 TABLET, FILM COATED ORAL AT BEDTIME
Qty: 30 TABLET | Refills: 2 | Status: SHIPPED | OUTPATIENT
Start: 2024-12-27

## 2024-12-27 ASSESSMENT — PAIN SCALES - GENERAL: PAINLEVEL_OUTOF10: NO PAIN (0)

## 2024-12-27 NOTE — PATIENT INSTRUCTIONS
- Increase Effexor by 75 mg daily  - Start Trazodone 50 mg - Take 0.5-1 tablet (25 mg - 50 mg) at bedtime. Let me know in a week via [employer] how you are tolerating - if you're groggy or if it's helping.  - Follow up in 1 month  Thank you for allowing Analy Serrano DNP, APRN to participate in your care.  If you have a scheduling or an appointment question please contact our scheduling department at their direct line [phone].   ALL nursing questions or concerns can be directed to the psychiatry nurses at [phone] or [phone]
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
## 2024-12-31 ENCOUNTER — APPOINTMENT (OUTPATIENT)
Dept: PSYCHOLOGY | Facility: OTHER | Age: 24
End: 2024-12-31
Attending: COUNSELOR
Payer: COMMERCIAL

## 2024-12-31 DIAGNOSIS — F32.A ANXIETY AND DEPRESSION: Primary | ICD-10-CM

## 2024-12-31 DIAGNOSIS — F41.9 ANXIETY AND DEPRESSION: Primary | ICD-10-CM

## 2025-01-02 ENCOUNTER — MYC MEDICAL ADVICE (OUTPATIENT)
Dept: PSYCHIATRY | Facility: OTHER | Age: 25
End: 2025-01-02

## 2025-01-03 ENCOUNTER — HOSPITAL ENCOUNTER (EMERGENCY)
Facility: HOSPITAL | Age: 25
Discharge: HOME | End: 2025-01-04
Attending: INTERNAL MEDICINE
Payer: COMMERCIAL

## 2025-01-03 ENCOUNTER — HOSPITAL ENCOUNTER (EMERGENCY)
Facility: HOSPITAL | Age: 25
Discharge: HOME | End: 2025-01-03
Attending: NURSE PRACTITIONER | Admitting: NURSE PRACTITIONER
Payer: COMMERCIAL

## 2025-01-03 VITALS
WEIGHT: 228 LBS | HEART RATE: 98 BPM | RESPIRATION RATE: 18 BRPM | OXYGEN SATURATION: 96 % | DIASTOLIC BLOOD PRESSURE: 89 MMHG | TEMPERATURE: 98.3 F | HEIGHT: 73 IN | BODY MASS INDEX: 30.22 KG/M2 | SYSTOLIC BLOOD PRESSURE: 142 MMHG

## 2025-01-03 VITALS
TEMPERATURE: 97.8 F | WEIGHT: 236.88 LBS | RESPIRATION RATE: 16 BRPM | HEART RATE: 107 BPM | BODY MASS INDEX: 31.25 KG/M2 | DIASTOLIC BLOOD PRESSURE: 87 MMHG | OXYGEN SATURATION: 96 % | SYSTOLIC BLOOD PRESSURE: 148 MMHG

## 2025-01-03 DIAGNOSIS — H57.89 IRRITATION OF LEFT EYE: Primary | ICD-10-CM

## 2025-01-03 DIAGNOSIS — H57.12 LEFT EYE PAIN: ICD-10-CM

## 2025-01-03 DIAGNOSIS — H11.89 CONJUNCTIVAL IRRITATION: ICD-10-CM

## 2025-01-03 DIAGNOSIS — Z97.3 WEARS CONTACT LENSES: ICD-10-CM

## 2025-01-03 PROCEDURE — 99213 OFFICE O/P EST LOW 20 MIN: CPT | Performed by: NURSE PRACTITIONER

## 2025-01-03 PROCEDURE — 99282 EMERGENCY DEPT VISIT SF MDM: CPT | Performed by: INTERNAL MEDICINE

## 2025-01-03 PROCEDURE — 99283 EMERGENCY DEPT VISIT LOW MDM: CPT

## 2025-01-03 PROCEDURE — G0463 HOSPITAL OUTPT CLINIC VISIT: HCPCS

## 2025-01-03 PROCEDURE — 250N000009 HC RX 250: Performed by: NURSE PRACTITIONER

## 2025-01-03 RX ORDER — TETRACAINE HYDROCHLORIDE 5 MG/ML
1-2 SOLUTION OPHTHALMIC ONCE
Status: COMPLETED | OUTPATIENT
Start: 2025-01-03 | End: 2025-01-03

## 2025-01-03 RX ORDER — TETRACAINE HYDROCHLORIDE 5 MG/ML
2 SOLUTION OPHTHALMIC ONCE
Status: COMPLETED | OUTPATIENT
Start: 2025-01-03 | End: 2025-01-04

## 2025-01-03 RX ORDER — TOBRAMYCIN 3 MG/ML
1-2 SOLUTION/ DROPS OPHTHALMIC
Qty: 5 ML | Refills: 0 | Status: SHIPPED | OUTPATIENT
Start: 2025-01-03

## 2025-01-03 RX ADMIN — FLUORESCEIN SODIUM 1 STRIP: 1 STRIP OPHTHALMIC at 19:13

## 2025-01-03 RX ADMIN — TETRACAINE HYDROCHLORIDE 2 DROP: 5 SOLUTION OPHTHALMIC at 19:13

## 2025-01-03 ASSESSMENT — ACTIVITIES OF DAILY LIVING (ADL): ADLS_ACUITY_SCORE: 42

## 2025-01-03 ASSESSMENT — ENCOUNTER SYMPTOMS
PHOTOPHOBIA: 0
EYE PAIN: 1

## 2025-01-03 ASSESSMENT — VISUAL ACUITY
OS: 20/100
OU: 20/50
OU: 1
OD: 20/100
OU: NORMAL

## 2025-01-03 ASSESSMENT — COLUMBIA-SUICIDE SEVERITY RATING SCALE - C-SSRS
6. HAVE YOU EVER DONE ANYTHING, STARTED TO DO ANYTHING, OR PREPARED TO DO ANYTHING TO END YOUR LIFE?: NO
1. IN THE PAST MONTH, HAVE YOU WISHED YOU WERE DEAD OR WISHED YOU COULD GO TO SLEEP AND NOT WAKE UP?: NO
2. HAVE YOU ACTUALLY HAD ANY THOUGHTS OF KILLING YOURSELF IN THE PAST MONTH?: NO

## 2025-01-04 PROCEDURE — 250N000009 HC RX 250: Performed by: INTERNAL MEDICINE

## 2025-01-04 RX ADMIN — TETRACAINE HYDROCHLORIDE 2 DROP: 5 SOLUTION OPHTHALMIC at 00:10

## 2025-01-04 RX ADMIN — FLUORESCEIN SODIUM 1 STRIP: 1 STRIP OPHTHALMIC at 00:10

## 2025-01-04 NOTE — DISCHARGE INSTRUCTIONS
No foreign body or abrasion appreciated to your eye.  Stop wearing her contact lenses until your symptoms are resolved or cleared by eye doctor.    Apply the eyedrops that were prescribed today to your eye.  Tylenol or ibuprofen as needed for pain.  Warm compresses to your eye.    Schedule an appointment with your eye doctor or primary doctor next week for reevaluation if no improvement in symptoms.    Return to urgent care or Emergency Department for any worsening or concerning symptoms.

## 2025-01-04 NOTE — ED TRIAGE NOTES
States he developed left eye pain this morning after putting in his contacts. States he was seen in [facility] today for this pain and was started on eye drops. States he is still having pain and it feels like there is something in upper left eye. States he did take Ibuprofen at 2000 and this did help a little.      Triage Assessment (Adult)       Row Name 01/03/25 6924          Triage Assessment    Airway WDL WDL

## 2025-01-04 NOTE — ED NOTES
Discharge instructions gone over with patient and he is discharged in stable condition, ambulatory.

## 2025-01-04 NOTE — ED PROVIDER NOTES
History     Chief Complaint   Patient presents with    Eye Pain     HPI  Gallito Doshi is a 24 year old male who presents today with concerns of left eye pain and irritation that started yesterday and worsened today.  Patient notes that when he went to take out his contact lenses yesterday he did notice some discomfort but did not think much of it at that time.  Today when he did try to put in his contact lenses and again he does have to this eye.  He feels pain to the medial upper eyelid.  Feels like something is in his eye and scratched his eyeball.  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  He has been flushing his eye with minimal effectiveness.    No changes to his vision.    Allergies:  No Known Allergies    Problem List:    Patient Active Problem List    Diagnosis Date Noted    Anxiety 03/11/2024     Priority: Medium    Acute appendicitis with localized peritonitis, without perforation, abscess, or gangrene 07/24/2022     Priority: Medium    FHx: hemochromatosis 08/31/2021     Priority: Medium    Moderate episode of recurrent major depressive disorder (H) 11/15/2019     Priority: Medium    Persistent depressive disorder 11/15/2019     Priority: Medium    Suicidal ideation 11/14/2019     Priority: Medium    Homicidal ideation 01/17/2017     Priority: Medium    Mental health problem 11/28/2016     Priority: Medium    Disorder of teeth and supporting structures 04/26/2006     Priority: Medium     Overview:   IMO Update 10/11          Past Medical History:    No past medical history on file.    Past Surgical History:    Past Surgical History:   Procedure Laterality Date    LAPAROSCOPIC APPENDECTOMY N/A 7/24/2022    Procedure: APPENDECTOMY, LAPAROSCOPIC;  Surgeon: Kenny Zambrano DO;  Location: [facility]    SCLERAL BUCKLE Right 10/17/2019    Left eye done 03/2020       Family History:    Family History   Problem Relation Age of Onset    Depression Mother     Hemochromatosis Mother     No Known  "Problems Father     No Known Problems Sister     No Known Problems Brother     No Known Problems Brother     Schizophrenia Maternal Grandmother     Hemochromatosis Maternal Grandmother     Suicide Cousin 13        dead    Hemochromatosis Other        Social History:  Marital Status:  Single [1]  Social History     Tobacco Use    Smoking status: Never    Smokeless tobacco: Never   Substance Use Topics    Alcohol use: Not Currently     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 Negative for photophobia and visual disturbance.   All other systems reviewed and are negative.      Physical Exam   BP: 142/89  Pulse: 98  Temp: 98.3  F (36.8  C)  Resp: 18  Height: 185.4 cm (6' 1\")  Weight: 103.4 kg (228 lb)  SpO2: 96 %      Physical Exam  Vitals and nursing note reviewed.   Constitutional:       Appearance: Normal appearance. He is not ill-appearing or toxic-appearing.   HENT:      Head: Atraumatic.      Mouth/Throat:      Mouth: Mucous membranes are moist.   Eyes:      General: Lids are everted, no foreign bodies appreciated. Vision grossly intact. Gaze aligned appropriately. No visual field deficit.        Right eye: No discharge.         Left eye: No foreign body, discharge or hordeolum.      Extraocular Movements: Extraocular movements intact.      Left eye: Normal extraocular motion and no nystagmus.      Conjunctiva/sclera:      Right eye: Right conjunctiva is not injected. No exudate.     Left eye: Left conjunctiva is injected. No hemorrhage.     Pupils: Pupils are equal, " round, and reactive to light.      Comments: Tenderness appreciated to mid left upper eyelid as well as medial left upper lower eyelid.  No obvious foreign body to this area.  No eyelid swelling or erythema.  Visual acuity left eye: 20/100 without corrective lenses  Visual acuity right eye: 20/100 without corrective lenses.   Cardiovascular:      Rate and Rhythm: Normal rate.   Pulmonary:      Effort: Pulmonary effort is normal. No respiratory distress.   Musculoskeletal:      Cervical back: Neck supple.   Skin:     Coloration: Skin is not pale.   Neurological:      Mental Status: He is alert and oriented to person, place, and time.         ED Course        Procedures         No results found for this or any previous visit (from the past 24 hours).    Medications   tetracaine (PONTOCAINE) 0.5 % ophthalmic solution 1-2 drop (2 drops Left Eye $Given 1/3/25 1913)   fluorescein (FUL-RAN) ophthalmic strip 1 strip (1 strip Left Eye $Given 1/3/25 1913)       Assessments & Plan (with Medical Decision Making)   24-year-old male with left eye pain and irritation since yesterday.  Feels like something is in his eye.  No obvious foreign body appreciated.  No periorbital erythema or edema.  Eye was examined with fluorescein stain with no obvious corneal abrasion.    Differentials included but not limited to foreign body in eye, corneal abrasion, conjunctivitis.  Symptoms could have been caused by a possible foreign body that was on his contact lenses.  He has been flushing his eye and it could have possibly fallen out.  Patient advised to stop wearing his contact lenses until symptoms are resolved.  When he does start wearing contact lenses he will need to start wearing a new pair.  He will be treated with tobramycin eyedrops as prescribed.  Tylenol or ibuprofen as needed for pain.  Recommended close follow-up with ophthalmologist for reevaluation.  Strict return precautions to UC/ER discussed.     I have reviewed the nursing  notes.    I have reviewed the findings, diagnosis, plan and need for follow up with the patient.  This document was prepared using a combination of typing and voice generated software.  While every attempt was made for accuracy, spelling and grammatical errors may exist.         New Prescriptions    TOBRAMYCIN (TOBREX) 0.3 % OPHTHALMIC SOLUTION    Place 1-2 drops Into the left eye every 4 hours (while awake).       Final diagnoses:   Irritation of left eye   Wears contact lenses       1/3/2025   [facility]       Gerson, Verna, CNP  01/03/25 [id number]

## 2025-01-04 NOTE — ED NOTES
Eye vision completed with glasses on and Right eye corrected is 20/50, left eye corrected is 20/200 and blurry, bilateral 20/700 corrected.

## 2025-01-04 NOTE — ED TRIAGE NOTES
Pt presents with left eye irritation x1 day. Pt has a unknown cause of irritation. Pt stated pain full and feels like something is scratching eye. Pt has been rinsing eye with no relief.

## 2025-01-05 ASSESSMENT — ENCOUNTER SYMPTOMS
COLOR CHANGE: 0
SLEEP DISTURBANCE: 0
DIAPHORESIS: 0
VOMITING: 0
ANAL BLEEDING: 0
BACK PAIN: 0
WEAKNESS: 0
DYSURIA: 0
CONFUSION: 0
BLOOD IN STOOL: 0
CHILLS: 0
MYALGIAS: 0
ABDOMINAL DISTENTION: 0
FREQUENCY: 0
WHEEZING: 0
DIZZINESS: 0
EYE PAIN: 1
NUMBNESS: 0
BLURRED VISION: 0
VOICE CHANGE: 0
CHEST TIGHTNESS: 0
SHORTNESS OF BREATH: 0
NECK PAIN: 0
LIGHT-HEADEDNESS: 0
NAUSEA: 0
ABDOMINAL PAIN: 0
EYE ITCHING: 0
HEADACHES: 0
FEVER: 0
COUGH: 0
PALPITATIONS: 0
FLANK PAIN: 0
BLIND SPOTS: 0

## 2025-01-06 NOTE — PROGRESS NOTES
Progress Note    Client Name: Gallito Doshi  Date: December 31,2024         Service Type: Individual      Session Start Time: 0840  Session End Time: 0935      Session Length: 55          Attendees: Client attended alone       PHQ-9 :       9/9/2024    11:14 AM 9/26/2024     7:26 AM 10/24/2024     7:58 AM   PHQ-9 SCORE   PHQ-9 Total Score MyChart   0   PHQ-9 Total Score 3 0 0        Patient-reported      ADRY-7 :        9/9/2024    11:14 AM 9/26/2024     7:26 AM 10/24/2024     7:58 AM   ADRY-7 SCORE   Total Score   0 (minimal anxiety)   Total Score 1 0 0        Patient-reported           DATA    Gallito Doshi presented as O x 4,coherent,relevant and moderately distressed. His memory.was intact .The pt exhibited no overt signs of psychotic processes. There was no report of symptoms indicating derailment of thought  such as  A/V hallucinations.The pt denied S/H ideations and as a consequence  there was immediate no need  for a  safety plan.The patient's insight and judgement were within a range acceptable for safety.   fund of information appears to be within a normal range. There  were no reports of sleep disturbance nor disturbance in appetite. The reported that he only sleep about six hours per night. He doesn't report being tired.    Chir reports that his focus and concentration is still lacking.Notices not having taken his medication. He does notice not having sufficient.medication causes him some fatigue.Had a good lakeisha with his family. He has some problems with picking his nails. He reports some distraction from the ringing in his ear when he is occupied. Suggested that he try to distract by using classical music has a pattern,    The theme of this session was         Progress Since Last Session (Related to Symptoms / Goals / Homework):   Symptoms: Worsening      Homework: Completed in session     Current / Ongoing Stressors and Concerns:   Upset about his  father relapsing on alcohol and drugs     Treatment Objective(s) Addressed in This Session:   Alleviate his distress     Intervention:   CBT/     Response to Interventions:   Evaluated options as a response to his father's behavior.     ASSESSMENT: Current Emotional / Mental Status (status of significant symptoms):   Risk status (Self / Other harm or suicidal ideation)   Client denies current fears or concerns for personal safety.   Client denies current or recent suicidal ideation or behaviors.   Client denies current or recent homicidal ideation or behaviors.   Client denies current or recent self injurious behavior or ideation.   Client denies other safety concerns.   Recommended that patient call 911 or go to the local ED should there be a change in any of these risk factors     Appearance:   Appropriate    Eye Contact:   Good    Psychomotor Behavior: Normal    Attitude:   Cooperative    Orientation:   All   Speech    Rate / Production: Emotional    Volume:  Normal    Mood:    Angry  Anxious  Dysphoric   Affect:    Mood congruent    Thought Content:  Clear    Thought Form:  Coherent  Logical    Insight:    Good         Medication Compliance:   Yes     Changes in Health Issues:   None reported     Chemical Use Review:   Substance Use: Chemical use reviewed, no active concerns identified      Tobacco Use: No current tobacco use.       Collateral Reports Completed:   Not Applicable    PLAN: (Client Tasks / Therapist Tasks / Other)    Monitor the use of music as aide to distract from ringing in his ear.    Catracho Bishop [facility]  The author of this note documented a reason for not sharing it with the patient.

## 2025-01-06 NOTE — ED PROVIDER NOTES
History     Chief Complaint   Patient presents with    Eye Pain     The history is provided by the patient.   Eye Problem  Location:  Left eye  Quality:  Aching  Severity:  Severe  Onset quality:  Gradual  Duration:  8 hours  Timing:  Constant  Progression:  Worsening  Chronicity:  New  Context: contact lenses    Relieved by:  Nothing  Ineffective treatments:  NSAIDs  Associated symptoms: no blurred vision, no headaches, no itching, no nausea, no numbness, no scotomas, no vomiting and no weakness        Allergies:  No Known Allergies    Problem List:    Patient Active Problem List    Diagnosis Date Noted    Anxiety 03/11/2024     Priority: Medium    Acute appendicitis with localized peritonitis, without perforation, abscess, or gangrene 07/24/2022     Priority: Medium    FHx: hemochromatosis 08/31/2021     Priority: Medium    Moderate episode of recurrent major depressive disorder (H) 11/15/2019     Priority: Medium    Persistent depressive disorder 11/15/2019     Priority: Medium    Suicidal ideation 11/14/2019     Priority: Medium    Homicidal ideation 01/17/2017     Priority: Medium    Mental health problem 11/28/2016     Priority: Medium    Disorder of teeth and supporting structures 04/26/2006     Priority: Medium     Overview:   IMO Update 10/11          Past Medical History:    No past medical history on file.    Past Surgical History:    Past Surgical History:   Procedure Laterality Date    LAPAROSCOPIC APPENDECTOMY N/A 7/24/2022    Procedure: APPENDECTOMY, LAPAROSCOPIC;  Surgeon: Kenny Zambrano DO;  Location: [facility]    SCLERAL BUCKLE Right 10/17/2019    Left eye done 03/2020       Family History:    Family History   Problem Relation Age of Onset    Depression Mother     Hemochromatosis Mother     No Known Problems Father     No Known Problems Sister     No Known Problems Brother     No Known Problems Brother     Schizophrenia Maternal Grandmother     Hemochromatosis Maternal Grandmother     Suicide  Cousin 13        dead    Hemochromatosis Other        Social History:  Marital Status:  Single [1]  Social History     Tobacco Use    Smoking status: Never    Smokeless tobacco: Never   Substance Use Topics    Alcohol use: Not Currently     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   HENT:  Negative for voice change.    Eyes:  Positive for pain. Negative for blurred vision, itching and visual disturbance.   Respiratory:  Negative for cough, chest tightness, shortness of breath and wheezing.    Cardiovascular:  Negative for chest pain, palpitations and leg swelling.   Gastrointestinal:  Negative for abdominal distention, abdominal pain, anal bleeding, blood in stool, nausea and vomiting.   Genitourinary:  Negative for decreased urine volume, dysuria, flank pain and frequency.   Musculoskeletal:  Negative for back pain, gait problem, myalgias and neck pain.   Skin:  Negative for color change, pallor and rash.   Neurological:  Negative for dizziness, syncope, weakness, light-headedness, numbness and headaches.   Psychiatric/Behavioral:  Negative for confusion, sleep disturbance and suicidal ideas.        Physical Exam   BP: 148/87  Pulse: 107  Temp: 97.8  F (36.6  C)  Resp: 16  Weight: 107.5 kg (236 lb 14.2 oz)  SpO2: 96 %      Physical Exam  Constitutional:       General: He is not in acute distress.     Appearance: Normal appearance. He is not toxic-appearing.   HENT:      Head: Atraumatic.   Eyes:      General: Lids are normal. No scleral  icterus.        Left eye: No foreign body, discharge or hordeolum.      Extraocular Movements: Extraocular movements intact.      Conjunctiva/sclera: Conjunctivae normal.      Left eye: Left conjunctiva is not injected. No chemosis, exudate or hemorrhage.     Pupils:      Right eye: Pupil is reactive and not sluggish.      Left eye: Pupil is reactive and not sluggish. No corneal abrasion or fluorescein uptake. Ted exam negative.  Cardiovascular:      Rate and Rhythm: Normal rate.      Heart sounds: Normal heart sounds.   Pulmonary:      Effort: Pulmonary effort is normal. No respiratory distress.      Breath sounds: Normal breath sounds.   Abdominal:      Palpations: Abdomen is soft.      Tenderness: There is no abdominal tenderness.   Musculoskeletal:         General: No deformity.      Cervical back: Neck supple.   Skin:     General: Skin is warm.   Neurological:      Mental Status: He is alert.         ED Course        Procedures                No results found for this or any previous visit (from the past 24 hours).    Medications   tetracaine (PONTOCAINE) 0.5 % ophthalmic solution 2 drop (2 drops Left Eye $Given by Other Clinician 1/4/25 0010)   fluorescein (FUL-RAN) ophthalmic strip 1 strip (1 strip Left Eye $Given by Other Clinician 1/4/25 0010)       Assessments & Plan (with Medical Decision Making)   Feeling foreign body on left eye  In exam no foreign body was found , no corneal abrasion or ulcer  After irrigation of left eye felt much better  Already on Abx drops   D C home, follow-up with PCP  I have reviewed the nursing notes.    I have reviewed the findings, diagnosis, plan and need for follow up with the patient.        Discharge Medication List as of 1/4/2025 12:31 AM          Final diagnoses:   Left eye pain   Conjunctival irritation       1/3/2025   [facility]       David Rabago MD  01/05/25 1959

## 2025-01-14 ENCOUNTER — OFFICE VISIT (OUTPATIENT)
Dept: PSYCHOLOGY | Facility: OTHER | Age: 25
End: 2025-01-14
Attending: COUNSELOR
Payer: COMMERCIAL

## 2025-01-14 DIAGNOSIS — F32.A ANXIETY AND DEPRESSION: Primary | ICD-10-CM

## 2025-01-14 DIAGNOSIS — F41.9 ANXIETY AND DEPRESSION: Primary | ICD-10-CM

## 2025-01-15 NOTE — PROGRESS NOTES
Progress Note    Client Name: Gallito Doshi  Date: January 14, 2025         Service Type: Individual      Session Start Time: 0900  Session End Time: 0955      Session Length: 55         Attendees: Client attended alone       PHQ-9 :       9/9/2024    11:14 AM 9/26/2024     7:26 AM 10/24/2024     7:58 AM   PHQ-9 SCORE   PHQ-9 Total Score MyChart   0   PHQ-9 Total Score 3 0 0        Patient-reported      ADRY-7 :        9/9/2024    11:14 AM 9/26/2024     7:26 AM 10/24/2024     7:58 AM   ADRY-7 SCORE   Total Score   0 (minimal anxiety)   Total Score 1 0 0        Patient-reported           DATA     Gallito Doshi presented as O x 4,coherent,relevant and pleasant.His memory.was intact .The pt exhibited no overt signs of psychotic processes. There was no report of symptoms indicating derailment of thought  such as  A/V hallucinations.The pt denied S/H ideations and as a consequence  there was no immediate need  for a  safety plan.The patient's insight and judgement were within a range acceptable for safety.   His fund of information appears to be within a normal range.     .Gallito reports that his work staff has decreased by half. He has to take some of the slack. 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 The pt isn't angry with his father about a drunk episode but disappointed . He is leaving the door open for reconciliation.      The theme of this session was      Progress Since Last Session (Related to Symptoms / Goals / Homework):   Symptoms: Improving      Homework: Completed in session     Current / Ongoing Stressors and Concerns:   Job stress related to inadequate staffing     Treatment Objective(s) Addressed in This Session:     Managing his anxiety/distress     Intervention:   Psychoeducation.     Response to Interventions:                  Attends well     ASSESSMENT: Current Emotional / Mental  Status (status of significant symptoms):   Risk status (Self / Other harm or suicidal ideation)   Client denies current fears or concerns for personal safety.   Client denies current or recent suicidal ideation or behaviors.   Client denies current or recent homicidal ideation or behaviors.   Client denies current or recent self injurious behavior or ideation.   Client denies other safety concerns.   Recommended that patient call 911 or go to the local ED should there be a change in any of these risk factors     Appearance:   Appropriate    Eye Contact:   Good    Psychomotor Behavior: Normal    Attitude:   Cooperative    Orientation:   All   Speech    Rate / Production: Normal     Volume:  Normal    Mood:    Anxious  Dysphoric   Affect:    Mood congruent    Thought Content:  Clear    Thought Form:  Coherent  Logical    Insight:    Good         Medication Compliance:   Yes     Changes in Health Issues:   None reported     Chemical Use Review:   Substance Use: Chemical use reviewed, no active concerns identified      Tobacco Use: No current tobacco use.       Collateral Reports Completed:   Not Applicable    PLAN: (Client Tasks / Therapist Tasks / Other)    Continue to develop strategies to deal with his distress    NAN Barrios    .The author of this note documented a reason for not sharing it with the patient.

## 2025-01-24 NOTE — PROGRESS NOTES
ADMISSION:  I am responsible for any personal items that are not sent to the safe or pharmacy. [facility] is not responsible for loss, theft or damage of any property in my possession.    Patient Signature _____________________ Date/Time _____________________    Staff Signature _______________________ Date/Time _____________________    2nd Staff person, if patient is unable/unwilling to sign  ___________________________________ Date/Time _____________________    DISCHARGE:  My personal items have been returned to me.   Patient Signature _____________________ Date/Time _____________________    In storage: Red Backpack, Shoes, Headphones, Wallet with school ID and 5$ cash, Sweatpants    Given to patient: T-Shirt, Sweatshirt    Sent to security: iPad, iPod   ESRD on dialysis

## 2025-01-27 ENCOUNTER — OFFICE VISIT (OUTPATIENT)
Dept: PSYCHIATRY | Facility: OTHER | Age: 25
End: 2025-01-27
Payer: COMMERCIAL

## 2025-01-27 ENCOUNTER — OFFICE VISIT (OUTPATIENT)
Dept: PSYCHOLOGY | Facility: OTHER | Age: 25
End: 2025-01-27
Attending: COUNSELOR
Payer: COMMERCIAL

## 2025-01-27 VITALS
DIASTOLIC BLOOD PRESSURE: 84 MMHG | BODY MASS INDEX: 28.1 KG/M2 | TEMPERATURE: 97.6 F | HEIGHT: 73 IN | SYSTOLIC BLOOD PRESSURE: 138 MMHG | OXYGEN SATURATION: 96 % | HEART RATE: 87 BPM | WEIGHT: 212 LBS

## 2025-01-27 DIAGNOSIS — F41.9 ANXIETY AND DEPRESSION: Primary | ICD-10-CM

## 2025-01-27 DIAGNOSIS — F32.A ANXIETY AND DEPRESSION: Primary | ICD-10-CM

## 2025-01-27 DIAGNOSIS — F90.2 ADHD (ATTENTION DEFICIT HYPERACTIVITY DISORDER), COMBINED TYPE: ICD-10-CM

## 2025-01-27 DIAGNOSIS — F41.9 ANXIETY: ICD-10-CM

## 2025-01-27 DIAGNOSIS — F33.1 MODERATE EPISODE OF RECURRENT MAJOR DEPRESSIVE DISORDER (H): ICD-10-CM

## 2025-01-27 PROCEDURE — 99213 OFFICE O/P EST LOW 20 MIN: CPT

## 2025-01-27 RX ORDER — VENLAFAXINE HYDROCHLORIDE 75 MG/1
75 CAPSULE, EXTENDED RELEASE ORAL DAILY
Qty: 90 CAPSULE | Refills: 1 | Status: SHIPPED | OUTPATIENT
Start: 2025-01-27

## 2025-01-27 RX ORDER — METHYLPHENIDATE HYDROCHLORIDE 20 MG/1
20 CAPSULE, EXTENDED RELEASE ORAL EVERY MORNING
Qty: 30 CAPSULE | Refills: 0 | Status: SHIPPED | OUTPATIENT
Start: 2025-01-27

## 2025-01-27 ASSESSMENT — PAIN SCALES - GENERAL: PAINLEVEL_OUTOF10: NO PAIN (0)

## 2025-01-27 NOTE — PROGRESS NOTES
OUTPATIENT PSYCHIATRY: FOLLOW UP ASSESSMENT (ADULT)     Identifying Information:  Gallito Doshi MRN# [id number]   Age: 24 year old YOB: 2000     Initial Psychiatry Visit Date: ***        Assessment & Plan     This is a 24 year old male patient who is seen today for follow up. ***    ***    Laboratory: {NONE:319853}    Referrals: {NONE:299771}    Follow Up: Return for follow up in 6 months          History of Present Illness     Gallito Doshi is a 24 year old male with a reported history of *** who is here today for follow up. Gallito attended the appointment {[facility] SESSION ATTENDANCE:669976}. Gallito was last seen on ***.    Trazodone helps fall asleep, but still trouble staying asleep. Did discuss the ability to take up to 75 mg if he can tolerate, that may help with the length.  Effexor is at a good level, ritalin at a good level. Anxiety feels more controlled, even with higher anxiety at work.    Asleep at 9-10 pm, up at 3-4 am.             Psychiatric Review of Symptoms     Mood/Depression: ***. Endorses {DEPRESSION SYMPTOMS:440417}.    Anxiety: ***. Endorses {ANXIETY SYMPTOMS:327345}.    Panic Attacks: *** panic attacks. Symptoms include {PANICATTACKtp:407574}.    Sleep: ***    Appetite: ***    Concentration/Distractibility: ***    Activity/Energy: ***    Current psychosocial stressors: {family dynamics:961426} {PSYCHOSOCIAL:337870}    Substance Use: No change. Current use includes: ***    Suicidal Ideation: Denies suicidal ideation or self-harm    Homicidal Ideation: Denies homicidal ideation    Therapy: ***         Past Medical/Surgical History     Medical diagnoses: History reviewed. No pertinent past medical history.    Surgical history:   Past Surgical History:   Procedure Laterality Date    LAPAROSCOPIC APPENDECTOMY N/A 7/24/2022    Procedure: APPENDECTOMY, LAPAROSCOPIC;  Surgeon: Kenny Zambrano DO;  Location: [facility] OR    SCLERAL BUCKLE Right 10/17/2019    Left eye done  "03/2020            Medical Review of Systems     Allergies: Patient has no known allergies.          Vital Signs: /84 (Cuff Size: Adult Regular)   Pulse 87   Temp 97.6  F (36.4  C) (Tympanic)   Ht 1.854 m (6' 1\")   Wt 96.2 kg (212 lb)   SpO2 96%   BMI 27.97 kg/m            Weight: 212 lbs 0 oz  BMI: Body mass index is 27.97 kg/m .    Physical Exam: Please refer to physical exam completed by primary care provider.    10 point review of systems is otherwise negative unless noted above.           Mental Status Examination     ***         Labs     No results found for this or any previous visit (from the past 24 hours).    Labs were reviewed, no concerns.         Medications                                                                  BOLD psych meds     I have reviewed this patient's current medications.    Current Outpatient Medications   Medication Sig Dispense Refill    methylphenidate (RITALIN LA) 20 MG 24 hr capsule Take 1 capsule (20 mg) by mouth every morning. 30 capsule 0    [START ON 2/11/2025] methylphenidate (RITALIN LA) 20 MG 24 hr capsule Take 1 capsule (20 mg) by mouth every morning. 30 capsule 0    traZODone (DESYREL) 50 MG tablet Take 0.5-1 tablets (25-50 mg) by mouth at bedtime. 30 tablet 2    venlafaxine (EFFEXOR XR) 150 MG 24 hr capsule Take 1 capsule (150 mg) by mouth daily. 90 capsule 1    venlafaxine (EFFEXOR XR) 75 MG 24 hr capsule Take 1 capsule (75 mg) by mouth daily. Take along with 150 mg capsule for a total daily dose of 225 mg. 30 capsule 2     No current facility-administered medications for this visit.       PDMP Review         Value Time User    State PDMP site checked  Yes 1/6/2025  7:28 AM Analy Serrano APRN CNP            Reviewed PDMP: ***          PHQ-9 / ADRY-7                       Reviewed PHQ-9 and ADRY-7 screenings.         9/9/2024    11:14 AM 9/26/2024     7:26 AM 10/24/2024     7:58 AM   PHQ-9 SCORE   PHQ-9 Total Score MyChart   0   PHQ-9 Total Score 3 0 0  "       Patient-reported         9/9/2024    11:14 AM 9/26/2024     7:26 AM 10/24/2024     7:58 AM   ADRY-7 SCORE   Total Score   0 (minimal anxiety)   Total Score 1 0 0        Patient-reported                *** minutes spent by me on the date of the encounter doing {2021 E&M time in:033396}     AMMY Lucero, PMHNP-BC    Patient was instructed to monitor for worsening symptoms and side effects from medications. If there is a serious deterioration of mood or any dangerous-type behaviors (suicidal/homicidal ideations) patient is advised to call 911 or report directly to the nearest Emergency Department.     Treatment Risk Statement: The risks, benefits, alternatives and potential adverse effects have been explained and are understood by the patient. The patient agrees to the treatment plan with the ability to do so. The patient knows to call the clinic for any problems or access emergency care if needed.     MG 24 hr capsule Take 1 capsule (20 mg) by mouth every morning. 30 capsule 0    traZODone (DESYREL) 50 MG tablet Take 0.5-1 tablets (25-50 mg) by mouth at bedtime. 30 tablet 2    venlafaxine (EFFEXOR XR) 150 MG 24 hr capsule Take 1 capsule (150 mg) by mouth daily. 90 capsule 1    venlafaxine (EFFEXOR XR) 75 MG 24 hr capsule Take 1 capsule (75 mg) by mouth daily. Take along with 150 mg capsule for a total daily dose of 225 mg. 30 capsule 2     No current facility-administered medications for this visit.       PDMP Review         Value Time User    State PDMP site checked  Yes 1/27/2025  8:52 AM Analy Serrano APRN CNP          Reviewed PDMP: Demonstrates appropriate use  12/27/24 Methylphenidate La 20mg Cp #23 for 23 days  12/27/24 Methylphenidate La 20mg Cp #7 for 7 days     Past medication trials:   Fluoxetine (19-20), venlafaxine (3/11/24-present)  Hydroxyzine (16-19)  Melatonin (16-present), Trazodone (19)  Olanzapine ODT/Inj (16-22), Quetiapine (16-19)  Adderall (age 16)              PHQ-9 / ADRY-7                       Reviewed PHQ-9 and ADRY-7 screenings.         9/26/2024     7:26 AM 10/24/2024     7:58 AM 2/3/2025    10:36 AM   PHQ-9 SCORE   PHQ-9 Total Score MyChart  0    PHQ-9 Total Score 0 0  1       Patient-reported         9/26/2024     7:26 AM 10/24/2024     7:58 AM 2/3/2025    10:36 AM   ADRY-7 SCORE   Total Score  0 (minimal anxiety)    Total Score 0 0  1       Patient-reported              14 minutes spent by me on the date of the encounter doing chart review, interpretation of tests, and patient visit     AMMY Lucero, [facility]-BC    Patient was instructed to monitor for worsening symptoms and side effects from medications. If there is a serious deterioration of mood or any dangerous-type behaviors (suicidal/homicidal ideations) patient is advised to call 911 or report directly to the nearest Emergency Department.     Treatment Risk Statement: The risks, benefits, alternatives and potential  adverse effects have been explained and are understood by the patient. The patient agrees to the treatment plan with the ability to do so. The patient knows to call the clinic for any problems or access emergency care if needed.

## 2025-01-27 NOTE — PATIENT INSTRUCTIONS
Thank you for allowing Analy Serrano DNP, APRN to participate in your care.  If you have a scheduling or an appointment question please contact our scheduling department at their direct line [phone].   ALL nursing questions or concerns can be directed to the psychiatry nurses at [phone] or [phone]

## 2025-01-28 NOTE — PROGRESS NOTES
Progress Note    Client Name: Gallito Doshi  Date: January 27, 2025         Service Type: Individual      Session Start Time: 0730  Session End Time: 0825      Session Length: 55         A  PHQ-9 :       9/9/2024    11:14 AM 9/26/2024     7:26 AM 10/24/2024     7:58 AM   PHQ-9 SCORE   PHQ-9 Total Score MyChart   0   PHQ-9 Total Score 3 0 0        Patient-reported      ADRY-7 :        9/9/2024    11:14 AM 9/26/2024     7:26 AM 10/24/2024     7:58 AM   ADRY-7 SCORE   Total Score   0 (minimal anxiety)   Total Score 1 0 0        Patient-reported           DATA     Gallito Doshi presented as O x 4,coherent,relevant,less distress then last seem and pleasant. His memory.was intact .The pt exhibited no overt signs of psychotic processes. There was no report of symptoms indicating derailment of thought  such as  A/V hallucinations.The pt denied S/H ideations and as a consequence  there was immediate no need  for a  safety plan.The patient's insight and judgement were within a range acceptable for safety.  His fund of information appears to be within a normal range. \    Liam report that his sleep remains somewhat of an issue but he is addressing with help of his psych med provider.Feeling things are going better than when last seen. Has given up on trying to reconcile with he is active relapse. Looking for a job because his might be sold.            Progress Since Last Session (Related to Symptoms / Goals / Homework):   Symptoms: Improving      Homework: Completed in session     Current / Ongoing Stressors and Concerns:   Job insecurity,father's relapse and job stress.     Treatment Objective(s) Addressed in This Session:     Alleviate anxiety     Intervention:   CBT/Motivational interview     Response to Interventions:   Open and receptive     ASSESSMENT: Current Emotional / Mental Status (status of significant symptoms):   Risk status (Self / Other harm or suicidal  ideation)   Client denies current fears or concerns for personal safety.   Client denies current or recent suicidal ideation or behaviors.   Client denies current or recent homicidal ideation or behaviors.   Client denies current or recent self injurious behavior or ideation.   Client denies other safety concerns.   Recommended that patient call 911 or go to the local ED should there be a change in any of these risk factors     Appearance:   Appropriate    Eye Contact:   Good    Psychomotor Behavior: Normal    Attitude:   Cooperative    Orientation:   All   Speech    Rate / Production: Normal     Volume:  Normal    Mood:    Anxious  Dysphoric   Affect:    Mood congruent   Thought Content:  Clear    Thought Form:  Coherent  Logical    Insight:    Good         Medication Compliance:   Yes     Changes in Health Issues:   None reported     Chemical Use Review:   Substance Use: Chemical use reviewed, no active concerns identified      Tobacco Use: No current tobacco use.       Collateral Reports Completed:   Not Applicable    PLAN: (Client Tasks / Therapist Tasks / Other)    Encourage pt at increasing activity    [facility]racho [facility]  The author of this note documented a reason for not sharing it with the patient.

## 2025-02-11 ENCOUNTER — OFFICE VISIT (OUTPATIENT)
Dept: PSYCHOLOGY | Facility: OTHER | Age: 25
End: 2025-02-11
Attending: COUNSELOR
Payer: COMMERCIAL

## 2025-02-11 DIAGNOSIS — F41.9 ANXIETY AND DEPRESSION: Primary | ICD-10-CM

## 2025-02-11 DIAGNOSIS — F32.A ANXIETY AND DEPRESSION: Primary | ICD-10-CM

## 2025-02-13 NOTE — PROGRESS NOTES
Progress Note    Client Name: Gallito Doshi  Date: February 11, 2025         Service Type: Individual      Session Start Time: 0850  Session End Time: 0945      Session Length: 55       Attendees: Client attended alone      PHQ-9 :       9/26/2024     7:26 AM 10/24/2024     7:58 AM 2/3/2025    10:36 AM   PHQ-9 SCORE   PHQ-9 Total Score MyChart  0    PHQ-9 Total Score 0 0  1       Patient-reported      ADRY-7 :        9/26/2024     7:26 AM 10/24/2024     7:58 AM 2/3/2025    10:36 AM   ADRY-7 SCORE   Total Score  0 (minimal anxiety)    Total Score 0 0  1       Patient-reported           DATA    Gallito Doshi presented as O x 4,coherent,relevant and pleasant. His memory was intact .The pt exhibited no overt signs of psychotic processes. There was no report of symptoms indicating derailment of thought  such as  A/V hallucinations.The pt denied S/H ideations and as a consequence  there was no immediate need  for a  safety plan.The patient's insight and judgement were within a range acceptable for safety. His  fund of information appears to be within a normal range. There  were no reports of sleep disturbance nor disturbance in appetite. .    The theme of this session was his remarkable set of changes. The  pt's mood has improved significantly.  He has lost weight,he is less irritable and his energy level appears to have increased            Progress Since Last Session (Related to Symptoms / Goals / Homework):   Symptoms: Improving      Homework: Completed in session     Current / Ongoing Stressors and Concerns:     Looking for another job; Heavy work load     Treatment Objective(s) Addressed in This Session:      Manage distress     Intervention:   Motivational interviewing     Response to Interventions:   Receptive to changing his life     ASSESSMENT: Current Emotional / Mental Status (status of significant symptoms):   Risk status (Self / Other harm or suicidal  ideation)   Client denies current fears or concerns for personal safety.   Client denies current or recent suicidal ideation or behaviors.   Client denies current or recent homicidal ideation or behaviors.   Client denies current or recent self injurious behavior or ideation.   Client denies other safety concerns.   Recommended that patient call 911 or go to the local ED should there be a change in any of these risk factors     Appearance:   Appropriate    Eye Contact:   Good    Psychomotor Behavior: Normal    Attitude:   Cooperative    Orientation:   All   Speech    Rate / Production: Normal     Volume:  Normal    Mood:    Anxious    Affect:               Mood congruent   Thought Content:  Clear    Thought Form:  Coherent  Logical    Insight:    Good         Medication Compliance:   Yes     Changes in Health Issues:   None reported     Chemical Use Review:   Substance Use: Chemical use reviewed, no active concerns identified      Tobacco Use: No current tobacco use.       Collateral Reports Completed:   Not Applicable    PLAN: (Client Tasks / Therapist Tasks / Other)  Continue to support pt toward  making healthy choices    NAN Barrios   .r

## 2025-03-04 ENCOUNTER — APPOINTMENT (OUTPATIENT)
Dept: OCCUPATIONAL MEDICINE | Facility: OTHER | Age: 25
End: 2025-03-04

## 2025-03-12 ENCOUNTER — OFFICE VISIT (OUTPATIENT)
Dept: PSYCHOLOGY | Facility: OTHER | Age: 25
End: 2025-03-12
Attending: COUNSELOR
Payer: COMMERCIAL

## 2025-03-12 DIAGNOSIS — F41.9 ANXIETY AND DEPRESSION: Primary | ICD-10-CM

## 2025-03-12 DIAGNOSIS — F32.A ANXIETY AND DEPRESSION: Primary | ICD-10-CM

## 2025-03-12 PROCEDURE — 90834 PSYTX W PT 45 MINUTES: CPT | Performed by: COUNSELOR

## 2025-03-19 NOTE — PROGRESS NOTES
Progress Note    Client Name: Gallito Doshi  Date: March 12, 2025         Service Type: Individual      Session Start Time: 0200  Session End Time: 0250      Session Length: 50          Attendees: Client attended alone       PHQ-9 :       9/26/2024     7:26 AM 10/24/2024     7:58 AM 2/3/2025    10:36 AM   PHQ-9 SCORE   PHQ-9 Total Score MyChart  0    PHQ-9 Total Score 0 0  1       Patient-reported      ADRY-7 :        9/26/2024     7:26 AM 10/24/2024     7:58 AM 2/3/2025    10:36 AM   ADRY-7 SCORE   Total Score  0 (minimal anxiety)    Total Score 0 0  1       Patient-reported           DATA    Gallito Doshi presented as O x 4,coherent,relevant and pleasant. memory.was intact .The pt exhibited no overt signs of psychotic processes. There was no report of symptoms indicating derailment of thought  such as  A/V hallucinations.The pt denied S/H ideations and as a consequence  there was no immediate need  for a  safety plan.The patient's insight and judgement were within a range acceptable for safety.   fund of information appears to be within a normal range.  His sleep is improving. He is feeling much better overall.      The theme of this session was the pt.'s new job. 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  He hopes that he will use the new job as a staging ground to better his life.         Progress Since Last Session (Related to Symptoms / Goals / Homework):   Symptoms: Improving      Homework: Completed in session     Current / Ongoing Stressors and Concerns:   Taking on a new job     Treatment Objective(s) Addressed in This Session:   Manage distress     Intervention:   CBT     Response to Interventions:   Receptive to help     ASSESSMENT: Current Emotional / Mental Status (status of significant symptoms):   Risk status (Self / Other harm or suicidal ideation)   Client denies current fears or concerns for personal safety.   Client denies current or recent  suicidal ideation or behaviors.   Client denies current or recent homicidal ideation or behaviors.   Client denies current or recent self injurious behavior or ideation.   Client denies other safety concerns.   Recommended that patient call 911 or go to the local ED should there be a change in any of these risk factors     Appearance:   Appropriate    Eye Contact:   Good    Psychomotor Behavior: Normal    Attitude:   Cooperative    Orientation:   All   Speech    Rate / Production: Normal     Volume:  Normal    Mood:    Anxious    Affect:    Mood congruent   Thought Content:  Clear    Thought Form:  Coherent  Logical    Insight:    Good         Medication Compliance:   Yes     Changes in Health Issues:   None reported     Chemical Use Review:   Substance Use: Chemical use reviewed, no active concerns identified      Tobacco Use: No current tobacco use.       Collateral Reports Completed:   Not Applicable    PLAN: (Client Tasks / Therapist Tasks / Other)  Step down to 1 month    NAN Barrios

## 2025-03-25 ENCOUNTER — OFFICE VISIT (OUTPATIENT)
Dept: PSYCHOLOGY | Facility: OTHER | Age: 25
End: 2025-03-25
Attending: COUNSELOR
Payer: COMMERCIAL

## 2025-03-25 DIAGNOSIS — F41.9 ANXIETY AND DEPRESSION: Primary | ICD-10-CM

## 2025-03-25 DIAGNOSIS — F32.A ANXIETY AND DEPRESSION: Primary | ICD-10-CM

## 2025-03-25 PROCEDURE — 90834 PSYTX W PT 45 MINUTES: CPT | Performed by: COUNSELOR

## 2025-03-31 NOTE — PROGRESS NOTES
Progress Note    Client Name: Gallito Doshi  Date: March 25, 2025         Service Type: Individual      Session Start Time: 0900  Session End Time: 0950      Session Length: 50          Attendees: Client attended alone       PHQ-9 :       9/26/2024     7:26 AM 10/24/2024     7:58 AM 2/3/2025    10:36 AM   PHQ-9 SCORE   PHQ-9 Total Score MyChart  0    PHQ-9 Total Score 0 0  1       Patient-reported      ADRY-7 :        9/26/2024     7:26 AM 10/24/2024     7:58 AM 2/3/2025    10:36 AM   ADRY-7 SCORE   Total Score  0 (minimal anxiety)    Total Score 0 0  1       Patient-reported           DATA    Gallito Doshi presented as O x 4,coherent,relevant and pleasant. His memory was intact .The pt exhibited no overt signs of psychotic processes. There was no report of symptoms indicating derailment of thought  such as  A/V hallucinations.The pt denied S/H ideations and as a consequence  there was no immediate need  for a  safety plan.The patient's insight and judgement were within a range acceptable for safety.   His fund of information appears to be within a normal range. There  were no reports of sleep disturbance nor disturbance in appetite. Meds appear to help.     Gallito appear to be doing exceptionally well. 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 Difficulty handling reading material. The pt was encouraged at working on this.         Progress Since Last Session (Related to Symptoms / Goals / Homework):   Symptoms: Improving      Homework: Completed in session     Current / Ongoing Stressors and Concerns:   New job     Treatment Objective(s) Addressed in This Session:      Improved mood;Alleviate Anxiety     Intervention:   Motivational interviewing          ASSESSMENT: Current Emotional / Mental Status (status of significant symptoms):   Risk status (Self / Other harm or  suicidal ideation)   Client denies current fears or concerns for personal safety.   Client denies current or recent suicidal ideation or behaviors.   Client denies current or recent homicidal ideation or behaviors.   Client denies current or recent self injurious behavior or ideation.   Client denies other safety concerns.   Recommended that patient call 911 or go to the local ED should there be a change in any of these risk factors     Appearance:   Appropriate    Eye Contact:   Good    Psychomotor Behavior: Normal    Attitude:   Cooperative    Orientation:   All   Speech    Rate / Production: Normal     Volume:  Normal    Mood:    Anxious    Affect:    Mood congruent   Thought Content:  Clear    Thought Form:  Coherent  Logical    Insight:    Good         Medication Compliance:   Yes     Changes in Health Issues:   None reported     Chemical Use Review:   Substance Use: Chemical use reviewed, no active concerns identified      Tobacco Use: No current tobacco use.       Collateral Reports Completed:   Not Applicable    PLAN: (Client Tasks / Therapist Tasks / Other)  Support pt in career plans    NAN Barrios

## 2025-04-29 ENCOUNTER — OFFICE VISIT (OUTPATIENT)
Dept: PSYCHOLOGY | Facility: OTHER | Age: 25
End: 2025-04-29
Attending: COUNSELOR
Payer: COMMERCIAL

## 2025-04-29 DIAGNOSIS — F41.9 ANXIETY AND DEPRESSION: Primary | ICD-10-CM

## 2025-04-29 DIAGNOSIS — F32.A ANXIETY AND DEPRESSION: Primary | ICD-10-CM

## 2025-04-29 PROCEDURE — 90837 PSYTX W PT 60 MINUTES: CPT | Performed by: COUNSELOR

## 2025-04-29 NOTE — PROGRESS NOTES
Assessment & Plan     Acute pain of left shoulder  Xray normal. Exam with limited ROM and TTP over many areas. Will proceed with MRI d/t young age and exam findings   - XR SHOULDER LEFT G/E 2 VIEWS (Clinic Performed)  - cyclobenzaprine (FLEXERIL) 10 MG tablet; Take 1 tablet (10 mg) by mouth 3 times daily as needed for muscle spasms.  - diclofenac (VOLTAREN) 1 % topical gel; Apply 4 g topically 4 times daily as needed for moderate pain.  - MR Shoulder Left w/o Contrast; Future  - Orthopedic  Referral; Future - Dr Alexandra    Moderate episode of recurrent major depressive disorder (H) / Anxiety  follows with Analy with last visit 1/27/2025. Note reviewed. Doing well. No change in medications. Follow up in 6 months     Weight loss  Gallito has been working on diet with successful weight loss      The longitudinal plan of care for the diagnosis(es)/condition(s) as documented were addressed during this visit. Due to the added complexity in care, I will continue to support Gallito in the subsequent management and with ongoing continuity of care.      Follow-up  Return if symptoms worsen or fail to improve.    Subjective   Gallito is a 24 year old, presenting for the following health issues:  Musculoskeletal Problem        5/1/2025     7:55 AM   Additional Questions   Roomed by Isabel Sanchez   Accompanied by self     History of Present Illness       Reason for visit:  Shoulder Pain  Symptom onset:  3-4 weeks ago  Symptoms include:  Muscular pain radiating to my neck and down my arm feels like rubber bands are snapping in my back  Symptom intensity:  Moderate  Symptom progression:  Worsening  Had these symptoms before:  No  What makes it worse:  Pushing away/pulling toward  What makes it better:  None   He is taking medications regularly.            Musculoskeletal problem/pain - Left shoulder    Duration: 3-4 weeks  Description  Location: left shoulder  Intensity:  severe  Accompanying signs and symptoms:  "radiation of pain to neck and down left arm. Feels like bands are snapping  History  Previous similar problem: no   Previous evaluation:  none  Precipitating or alleviating factors:  Trauma or overuse: no   Aggravating factors include: pushing and pulling  Therapies tried and outcome: acetaminophen, Ibuprofen, and deep heating rub which does not help    - no original injury   - painful over left rhomboid   - gets better / worse  - using icyhot  - massage therapy w/o improvement       **Patient stated that he stopped drinking in November. Mental Health is in a good spot. 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 Note reviewed. Doing well. No change in medications. Follow up in 6 months     Wt Readings from Last 4 Encounters:   05/01/25 91.7 kg (202 lb 1.6 oz)   01/27/25 96.2 kg (212 lb)   01/03/25 107.5 kg (236 lb 14.2 oz)   01/03/25 103.4 kg (228 lb)     - has been tried to lose weight   - fasting and calorie counting      Review of Systems  Constitutional, HEENT, cardiovascular, pulmonary, gi and gu systems are negative, except as otherwise noted.      Objective    /80   Pulse 78   Temp 96.9  F (36.1  C) (Tympanic)   Resp 16   Ht 1.859 m (6' 1.2\")   Wt 91.7 kg (202 lb 1.6 oz)   SpO2 98%   BMI 26.52 kg/m    Body mass index is 26.52 kg/m .  Physical Exam  Constitutional:       General: He is not in acute distress.     Appearance: He is not ill-appearing.   Cardiovascular:      Rate and Rhythm: Normal rate and regular rhythm.      Heart sounds: No murmur heard.  Pulmonary:      Effort: Pulmonary effort is normal. No respiratory distress.      Breath sounds: No wheezing or rales.   Musculoskeletal:      Comments: Left shoulder: TTP over AC joint, left trapezius, rhomboid. Flexion to 85deg, abduction to 10 deg, internal rotation to S5. Positive Neers. All movement with pain    Neurological:      Mental Status: He is alert. "   Psychiatric:         Mood and Affect: Mood normal.            XR SHOULDER LEFT G/E 2 VIEWS (Clinic Performed)    Result Date: 5/1/2025  PROCEDURE:  XR SHOULDER LEFT G/E 3 VIEWS HISTORY: Acute pain of left shoulder COMPARISON:  None. TECHNIQUE:  4 views of the left shoulder were obtained. FINDINGS:  No fracture or dislocation is identified. The joint spaces are preserved.      IMPRESSION: Normal left shoulder  TERE MAYO MD   SYSTEM ID:  [id number]         Signed Electronically by: Nelly Zapata MD

## 2025-05-01 ENCOUNTER — OFFICE VISIT (OUTPATIENT)
Dept: FAMILY MEDICINE | Facility: OTHER | Age: 25
End: 2025-05-01
Attending: FAMILY MEDICINE
Payer: COMMERCIAL

## 2025-05-01 ENCOUNTER — ANCILLARY PROCEDURE (OUTPATIENT)
Dept: GENERAL RADIOLOGY | Facility: OTHER | Age: 25
End: 2025-05-01
Attending: FAMILY MEDICINE
Payer: COMMERCIAL

## 2025-05-01 VITALS
TEMPERATURE: 96.9 F | WEIGHT: 202.1 LBS | SYSTOLIC BLOOD PRESSURE: 114 MMHG | RESPIRATION RATE: 16 BRPM | HEART RATE: 78 BPM | OXYGEN SATURATION: 98 % | DIASTOLIC BLOOD PRESSURE: 80 MMHG | BODY MASS INDEX: 26.78 KG/M2 | HEIGHT: 73 IN

## 2025-05-01 DIAGNOSIS — R63.4 WEIGHT LOSS: ICD-10-CM

## 2025-05-01 DIAGNOSIS — F41.9 ANXIETY: ICD-10-CM

## 2025-05-01 DIAGNOSIS — F33.1 MODERATE EPISODE OF RECURRENT MAJOR DEPRESSIVE DISORDER (H): ICD-10-CM

## 2025-05-01 DIAGNOSIS — M25.512 ACUTE PAIN OF LEFT SHOULDER: Primary | ICD-10-CM

## 2025-05-01 PROBLEM — R45.850 HOMICIDAL IDEATION: Status: RESOLVED | Noted: 2017-01-17 | Resolved: 2025-05-01

## 2025-05-01 PROBLEM — R45.851 SUICIDAL IDEATION: Status: RESOLVED | Noted: 2019-11-14 | Resolved: 2025-05-01

## 2025-05-01 PROCEDURE — 73030 X-RAY EXAM OF SHOULDER: CPT | Mod: LT | Performed by: RADIOLOGY

## 2025-05-01 RX ORDER — CYCLOBENZAPRINE HCL 10 MG
10 TABLET ORAL 3 TIMES DAILY PRN
Qty: 45 TABLET | Refills: 1 | Status: SHIPPED | OUTPATIENT
Start: 2025-05-01

## 2025-05-01 ASSESSMENT — PAIN SCALES - GENERAL: PAINLEVEL_OUTOF10: MODERATE PAIN (4)

## 2025-05-01 NOTE — PROGRESS NOTES
[facility] Primary Care: Integrated Behavioral Health  Integrated Behavioral Health Services   Diagnostic Assessment Update      PATIENT'S NAME: Gallito Doshi  MRN:   [id number]  :   2000  DATE OF SERVICE: 2025  SERVICE LOCATION: Face to Face in Clinic  Visit Activities: [facility] Only    Identifying Information:  Patient is a 24 year old year old, , single male.  Patient attended the session alone.        Updates on Presenting Concern(s):  Patient reports the following reason(s) for continuing to receive behavioral services: to develop tools to manage his dist.  Patient reported that his symptoms and concerns are improving.  Patient attributed the status of his current symptoms to improvements in their connectedness to resources and support services and changes in their access to and utilization of health care services.      Patient stated that his symptoms have resulted in the following functional impairments: relationship(s) and social interactions    Patient reports that other professional(s) are involved in providing support / services.      Standard Screening tools completed, including PHQ9 and GAD7.  See Epic for today's results.  Historical PHQ9:      10/24/2024     7:58 AM 2/3/2025    10:36 AM 2025     3:49 PM   PHQ-9 SCORE   PHQ-9 Total Score MyChart 0     PHQ-9 Total Score 0  1 0       Patient-reported     Historical GAD7:      10/24/2024     7:58 AM 2/3/2025    10:36 AM 2025     3:49 PM   ADRY-7 SCORE   Total Score 0 (minimal anxiety)     Total Score 0  1 0       Patient-reported       Review of Updates to Patient's Life Situation:  Patient reported experiencing relationship changes, which included dealing with an addicted father .  Patient identified some changes in the stability of their social connections and identified supports. Patient noted that their living situation did not change within the last year.     Patient's employment status did  change.(He has added a second job) Patient is currently employed full time and reports he is able to function appropriately at work..     Patient reported no changes or new involvment with the legal system.  There are no ethnic, cultural or Pentecostal factors that may be relevant for therapy.       Mental Health History:  Patient is currently receiving the following services: counseling and psychiatry.      Chemical Health History:   Patient is not currently receiving any chemical dependency treatment. Patient reports no problems as a result of their drinking / drug use.      Discussed the general effects of drugs and alcohol on health and well-being.      Significant Losses / Trauma / Abuse / Neglect Issues:  There are no no indications or report of: significant losses, trauma, abuse or neglect.    Issues of possible neglect are not present.      Medical History:   Patient Active Problem List   Diagnosis     Disorder of teeth and supporting structures     Moderate episode of recurrent major depressive disorder (H)     Persistent depressive disorder     FHx: hemochromatosis     Acute appendicitis with localized peritonitis, without perforation, abscess, or gangrene     Anxiety       Medication Review:  Current Outpatient Medications   Medication Sig Dispense Refill     cyclobenzaprine (FLEXERIL) 10 MG tablet Take 1 tablet (10 mg) by mouth 3 times daily as needed for muscle spasms. 45 tablet 1     diclofenac (VOLTAREN) 1 % topical gel Apply 4 g topically 4 times daily as needed for moderate pain. 150 g 1     methylphenidate (RITALIN LA) 20 MG 24 hr capsule Take 1 capsule (20 mg) by mouth daily. 30 capsule 0     [START ON 5/22/2025] methylphenidate (RITALIN LA) 20 MG 24 hr capsule Take 1 capsule (20 mg) by mouth every morning. 30 capsule 0     traZODone (DESYREL) 50 MG tablet Take 1-2 tablets ( mg) by mouth at bedtime. 60 tablet 2     venlafaxine (EFFEXOR XR) 150 MG 24 hr capsule Take 1 capsule (150 mg) by mouth  daily. 90 capsule 1     venlafaxine (EFFEXOR XR) 75 MG 24 hr capsule Take 1 capsule (75 mg) by mouth daily. Take along with 150 mg capsule for a total daily dose of 225 mg. 90 capsule 1     No current facility-administered medications for this visit.       Medication Compliance:  Yes    Patient was provided recommendation to follow-up with physician.      Mental Status Assessment:  Appearance:   Appropriate   Eye Contact:   Good   Psychomotor Behavior: Normal   Attitude:   Cooperative   Orientation:   All  Speech   Rate / Production: Normal    Volume:  Normal   Mood:    Euthymic  Affect:    Appropriate   Thought Content:  Clear   Thought Form:  Coherent  Logical   Insight:    Good       Safety Assessment:    Patient has had a history of suicidal ideation:   and self-injurious behavior:    Patient denies current or recent suicidal ideation or behaviors.  Patient denies current or recent homicidal ideation or behaviors.  Patient denies current or recent self injurious behavior or ideation.  Patient denies other safety concerns.  Patient reports there are no firearms in the house  Protective Factors Sense of responsibility to family   Risk Factors Family history of suicide      Plan for Safety and Risk Management:  A safety and risk management plan has not been developed at this time, however patient was encouraged to call [place] [phone] should there be a change in any of these risk factors.      Patient's Strengths and Limitations:  Patient identified the following strengths or resources that will help him succeed in counseling: commitment to health and well being, family support, and positive work environment. Patient identified the following supports: family. Things that may interfere with the patient's success in counseling include:financial hardship.    Diagnostic Criteria:  Unspecified Anxiety Disorder , Symptoms characteristic of an anxiety disorder that caused clinically significant distress or impairment  in social, occupational, or other important areas of functioning predominate but do not meet the full criteria for any of the disorders of the anxiety disorders diagnostic class.  Persistent Depressive Disorder  A. Depressed mood for most of the day, for more days than not, as indicated either by subjective account or observation by others, for at least 2 years. Note: In children and adolescents, mood can be irritable and duration must be at least 1 year.        - insomnia or hypersomnia       - low energy or fatigue        - low self-esteem        - poor concentration or difficulty making decisions        - feelings of hopelessness   D. Criteria for a major depressive disorder may be continously present for 2 years  E. There has never been a Manic Episode, a Mixed Episode, or a Hypomanic Episode, and criteria have never been met for Cyclothymic Disorder.   F. The disturbance is not better explained by a persistent schizoaffective disorder, schizophrenia, delusional disorder, or other specified or unspecified schizophrenia spectrum and other psychotic disorder  G. The symptoms are not attributable to the physiological effects of a substance (e.g., a drug of abuse, a medication) or another medical condition (e.g., hypothyroidism).   H. The symptoms cause clinically significant distress or impairment in social, occupational, or other important areas of functioning.   Attention Deficit Hyperactivity Disorder  A) A persistent pattern of inattention and/or hyperactivity-impulsivity that interferes with functioning or development, as characterized by (1) Inattention and/or (2) Hyperactivity and Impulsivity  (1) Inattention: 6 or more of the following symptoms have persisted for at least 6 months to a degree that is inconsistent with developmental level and that negatively impacts directly on social and academic/occupational activities:  - Often fails to give close attention to details or makes careless mistakes in  schoolwork, at work, or during other activities  - Often has difficulty sustaining attention in tasks or play activities  - Often does not seem to listen when spoken to directly  - Often does not follow through on instructions and fails to finish schoolwork, chores, or duties in the workplace  - Often has difficulty organizing tasks and activities  - Often avoids, dislikes, or is reluctant to engage in tasks that require sustained mental effort  - Often loses things necessary for tasks or activities  - Is often easily distractedby extraneous stimuli  - Is often forgetful in daily activities  - Often fidgets with or taps hands or feet or squirms in seat      Functional Status:  Patient's symptoms have caused reduced functional status in the following areas: Activities of Daily Living -    Social / Relational -        DSM5 Diagnoses: (Sustained by DSM5 Criteria Listed Above)  Diagnoses: Attention-Deficit/Hyperactivity Disorder  314.01 (F90.9) Unspecified Attention -Deficit / Hyperactivity Disorder  300.4 (F34.1) Persistent Depressive Disorder, With persistent major depressive episode  300.00 (F41.9) Unspecified Anxiety Disorder  Psychosocial & Contextual Factors: Hoping to buy a house,working two jobs    See Media section of EPIC medical record for completed WHODAS    Preliminary Treatment Plan:    Treatment will focus on: Depressed Mood -    Anxiety -   .    The Following referrals were discussed and initiated: Outpatient Therapy    Collaboration with other professionals is not indicated at this time.    Referral to another professional/service is not indicated at this time.    A Release of Information is not needed at this time.    Report to child or adult protection services was NA.    Catracho [facility], Behavioral Health Clinician   The author of this note documented a reason for not sharing it with the patient.

## 2025-05-15 ENCOUNTER — OFFICE VISIT (OUTPATIENT)
Dept: ORTHOPEDICS | Facility: OTHER | Age: 25
End: 2025-05-15
Attending: FAMILY MEDICINE
Payer: COMMERCIAL

## 2025-05-15 ENCOUNTER — ANCILLARY PROCEDURE (OUTPATIENT)
Dept: GENERAL RADIOLOGY | Facility: OTHER | Age: 25
End: 2025-05-15
Attending: ORTHOPAEDIC SURGERY
Payer: COMMERCIAL

## 2025-05-15 VITALS
BODY MASS INDEX: 25.98 KG/M2 | OXYGEN SATURATION: 99 % | DIASTOLIC BLOOD PRESSURE: 80 MMHG | WEIGHT: 196 LBS | HEART RATE: 87 BPM | HEIGHT: 73 IN | TEMPERATURE: 98.4 F | SYSTOLIC BLOOD PRESSURE: 120 MMHG

## 2025-05-15 DIAGNOSIS — M25.512 ACUTE PAIN OF LEFT SHOULDER: ICD-10-CM

## 2025-05-15 DIAGNOSIS — M54.10 RADICULOPATHY AFFECTING UPPER EXTREMITY: Primary | ICD-10-CM

## 2025-05-15 DIAGNOSIS — M54.10 RADICULOPATHY AFFECTING UPPER EXTREMITY: ICD-10-CM

## 2025-05-15 PROCEDURE — 3074F SYST BP LT 130 MM HG: CPT | Performed by: ORTHOPAEDIC SURGERY

## 2025-05-15 PROCEDURE — 99203 OFFICE O/P NEW LOW 30 MIN: CPT | Performed by: ORTHOPAEDIC SURGERY

## 2025-05-15 PROCEDURE — 72040 X-RAY EXAM NECK SPINE 2-3 VW: CPT | Performed by: RADIOLOGY

## 2025-05-15 PROCEDURE — 3079F DIAST BP 80-89 MM HG: CPT | Performed by: ORTHOPAEDIC SURGERY

## 2025-05-15 PROCEDURE — 1125F AMNT PAIN NOTED PAIN PRSNT: CPT | Performed by: ORTHOPAEDIC SURGERY

## 2025-05-15 ASSESSMENT — PAIN SCALES - GENERAL: PAINLEVEL_OUTOF10: MILD PAIN (2)

## 2025-05-16 ENCOUNTER — HOSPITAL ENCOUNTER (OUTPATIENT)
Dept: MRI IMAGING | Facility: HOSPITAL | Age: 25
Discharge: HOME | End: 2025-05-16
Attending: FAMILY MEDICINE | Admitting: RADIOLOGY
Payer: COMMERCIAL

## 2025-05-16 ENCOUNTER — RESULTS FOLLOW-UP (OUTPATIENT)
Dept: FAMILY MEDICINE | Facility: OTHER | Age: 25
End: 2025-05-16

## 2025-05-16 DIAGNOSIS — M25.512 ACUTE PAIN OF LEFT SHOULDER: ICD-10-CM

## 2025-05-16 PROCEDURE — 73221 MRI JOINT UPR EXTREM W/O DYE: CPT | Mod: 26 | Performed by: RADIOLOGY

## 2025-05-16 PROCEDURE — 73221 MRI JOINT UPR EXTREM W/O DYE: CPT | Mod: LT

## 2025-05-18 NOTE — PROGRESS NOTES
ORTHOPEDIC CLINIC CONSULT    Referred by:     Chief Complaint: Gallito Doshi is a 24 year old male who is being seen for   Chief Complaint   Patient presents with    Musculoskeletal Problem     Acute left shoulder pain       History of Present Illness:   Patient 24-year-old male presents for left shoulder pain which is actually more along his back and medial border of the scapula and also much of the shoulder.  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  He cannot fully recall 1 incident although he did have to help with the resuscitating a patient that he collapsed outside the ER back in May that may have began some of his symptoms.  Has been ongoing and little bit more activity driven does not do wake him up at night as much at rest although tenderness along the medial border but when he starts to either pushing pulling lifting is when he sits to get more extreme discomfort and also at rest.  He notes its mostly from again medial border along the rhomboids going towards midline but also just discomfort just along the edge of the scapula but denies any grinding or crepitus in the scapulothoracic area.  He presents for evaluation he does have a already scheduled MRI of the shoulder clinic later this week or early next week.  He presents for evaluation    Patient's past medical, surgical, social and family histories reviewed.     History reviewed. No pertinent past medical history.    Past Surgical History:   Procedure Laterality Date    LAPAROSCOPIC APPENDECTOMY N/A 7/24/2022    Procedure: APPENDECTOMY, LAPAROSCOPIC;  Surgeon: Kenny Zambrano DO;  Location: [facility] OR    SCLERAL BUCKLE Right 10/17/2019    Left eye done 03/2020       Home Medications:  Prior to Admission medications    Medication Sig Start Date End Date Taking? Authorizing Provider   cyclobenzaprine (FLEXERIL) 10 MG tablet Take 1 tablet (10 mg) by mouth 3 times daily as needed for muscle  "spasms. 5/1/25  Yes Nelly Zapata MD   diclofenac (VOLTAREN) 1 % topical gel Apply 4 g topically 4 times daily as needed for moderate pain. 5/1/25  Yes Nelly Zapata MD   methylphenidate (RITALIN LA) 20 MG 24 hr capsule Take 1 capsule (20 mg) by mouth daily. 4/25/25 5/25/25 Yes Analy Serrano APRN CNP   methylphenidate (RITALIN LA) 20 MG 24 hr capsule Take 1 capsule (20 mg) by mouth every morning. 5/22/25 6/21/25 Yes Analy Serrano APRN CNP   traZODone (DESYREL) 50 MG tablet Take 1-2 tablets ( mg) by mouth at bedtime. 4/2/25  Yes Analy Serrano APRN CNP   venlafaxine (EFFEXOR XR) 150 MG 24 hr capsule Take 1 capsule (150 mg) by mouth daily. 9/30/24  Yes Analy Serrano APRN CNP   venlafaxine (EFFEXOR XR) 75 MG 24 hr capsule Take 1 capsule (75 mg) by mouth daily. Take along with 150 mg capsule for a total daily dose of 225 mg. 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    Drug use: Never    Sexual activity: Never       Family History   Problem Relation Age of Onset    Depression Mother     Hemochromatosis Mother     No Known Problems Father     No Known Problems Sister     No Known Problems Brother     No Known Problems Brother     Schizophrenia Maternal Grandmother     Hemochromatosis Maternal Grandmother     Suicide Cousin 13        dead    Hemochromatosis Other        REVIEW OF SYSTEMS            Physical Exam:    Vitals: /80 (BP Location: Left arm, Patient Position: Sitting, Cuff Size: Adult Regular)   Pulse 87   Temp 98.4  F (36.9  C) (Tympanic)   Ht 1.854 m (6' 1\")   Wt 88.9 kg (196 lb)   SpO2 99%   BMI 25.86 kg/m    BMI= Body mass index is 25.86 kg/m .  On examination is awake alert and cooperative no " apparent distress.  Has overall good full range of motion of the shoulder to forward flexion abduction internal rotation he does have more discomfort when he did with resistance to pushing pulling and tenderness more along the medial border of the scapula as well as along the rhomboids towards the midline of the spine no tenderness along the spinous process.  In the low but more so in the mid to inferior portion of the scapula along the medial border more so than the upper.  He is not having any particular discomfort with just gentle range of motion regarding little bit of tightness with the extremes but more listed with the resisted motion.  Radiographs: Radiographs of the shoulder AP lateral scapular Y which is unremarkable no fracture subluxation dislocation mostly normal film.  Due to its location also get the cervical spine as it is gets more pain and discomfort along the area in the shoulder.  The cervical spine no acute or subacute abnormalities otherwise normal although does show slight narrowing right lateral aspect of the lateral axial view there projectional or degenerative nature.  Did get recommended from radiology that may need MRI of the area for further characterization.     Independent visualization of the films was made.         Impression:      ICD-10-CM    1. Radiculopathy affecting upper extremity  M54.10 XR CERVICAL SPINE 2/3 VWS (Clinic Performed)      2. Acute pain of left shoulder  M25.512 Orthopedic  Referral          Plan: Left shoulder pain   scapular medial border discomfort.  With the with normal x-rays of the shoulder and some questionable changes cervical spine lateral axial.  He is set to get an MRI of the shoulder in the next day or so.  Likely will have him finish off that before we had direct any further studies or other plans.  He is comfortable this plan will follow-up post MRI and then discuss findings and options.    All of the above pertinent physical exam and  imaging modalities findings was reviewed with Gallito.    Return to clinic in post MRI weeks.    Further imaging required getting MRI of the shoulder in a day or so    Time spent with evaluation:   minutes    Shivam Alexandra MD  5/17/2025  9:26 PM

## 2025-05-19 ENCOUNTER — OFFICE VISIT (OUTPATIENT)
Dept: ORTHOPEDICS | Facility: OTHER | Age: 25
End: 2025-05-19
Attending: PHYSICIAN ASSISTANT
Payer: COMMERCIAL

## 2025-05-19 ENCOUNTER — MYC MEDICAL ADVICE (OUTPATIENT)
Dept: ORTHOPEDICS | Facility: OTHER | Age: 25
End: 2025-05-19

## 2025-05-19 DIAGNOSIS — M25.312 DYSKINESIS OF LEFT SCAPULA: ICD-10-CM

## 2025-05-19 DIAGNOSIS — M25.512 PERISCAPULAR PAIN OF LEFT SHOULDER: Primary | ICD-10-CM

## 2025-05-19 DIAGNOSIS — S46.912D MUSCLE STRAIN OF LEFT SCAPULAR REGION, SUBSEQUENT ENCOUNTER: ICD-10-CM

## 2025-05-19 ASSESSMENT — PAIN SCALES - GENERAL: PAINLEVEL_OUTOF10: MILD PAIN (2)

## 2025-05-19 NOTE — LETTER
May 19, 2025      Gallito Doshi  [address]  [facility] [place] [id number]        To Whom It May Concern:    Gallito Doshi was seen in our clinic. He may return to work with the following restrictions:    **Ok to do 1:1 cares.  No heavy lifting or resisted use more than 25 lbs.    Please call with questions or concerns.      Sincerely,        Ramu Castelan PA-C    Electronically signed

## 2025-05-19 NOTE — PROGRESS NOTES
[facility]  Patient Name:Gallito Doshi  Date of Service: 25  :2000  Age:24 year old Sex:male  MRN: [id number]  Provider: Ramu Castelan PA-C  OFFICE NOTE    CHIEF COMPLAINT: Recheck left shoulder discuss MRI results    HISTORY OF PRESENT ILLNESS:  Gallito Doshi is a 24 year old male who returns today for recheck of his left shoulder and to discuss results of a recent MRI.  Patient initially saw Dr. Alexandra last week on 5/15/2025.  This was prior to an already scheduled MRI of the left shoulder.  Cervical spine films were obtained at that visit as well due to his symptoms.  Dr. Alexandra's note was reviewed.  Patient does update me on his history with regard to the left shoulder as well.  He describes onset of left periscapular pain approximately 4 weeks back around [facility].  He denies any specific injury or trauma but did assist with CPR around that time.  After onset of discomfort, he indicates that it progressed worsened into unbearable discomfort within a couple days.  He did see his PCP earlier this month with x-rays of the left shoulder.  X-rays were deemed unremarkable.  He was advised on use of NSAIDs, provided with Flexeril and MRI of the left shoulder was ordered and he was referred to orthopedics.    He is now here to discuss results of the recent left shoulder MRI.  Overall, he does feel his symptoms are beginning to improve.  He does continue to describe posterior shoulder pain that is periscapular and primarily along the medial border.  He describes some associated tingling in this region as well.  He notes both the discomfort and tingling are focal to this area with no radiation down the arm or elsewhere.  Both the discomfort and tingling are worsened with any attempted lifting or resisted use.  He has remained off work for the past few weeks.  He is concerned as he is unable to return until he is without restrictions.  He does not feel confident he can perform full duty but does  feel he could participate in 1:1 cares with light lifting restriction.     REVIEW OF SYSTEMS: See HPI.    PHYSICAL EXAM:  Vitals: There were no vitals taken for this visit.  Healthy-appearing 24-year-old male in no acute distress.  He is alert and oriented x 4.  He is pleasant and cooperative, has appropriate mood and affect.  Upon inspection, there is no gross deformity or atrophy about the left shoulder and periscapular region.  Skin is clean dry and intact with only minor acne noted.  No other evidence of erythema, rashes or lesions.  Skin is normothermic to touch throughout.  On palpation, he remains mildly tender along the medial border of the scapula and over the rhomboids.  He is otherwise nontender along the supraspinatus and infraspinatus muscle bellies, AC joint, greater tuberosity, or biceps tendon.  Shoulder exam today reveals full active forward flexion, abduction, ER at neutral, ER at 90.  He has limited internal rotation to T10 on the left versus T4 on the right.  Strength exam today reveals 5 out of 5 strength throughout the rotator cuff, deltoid, biceps, and trapezius.  He does have discomfort along the medial scapula with resistance exam.  Scapulothoracic exam does reveal some mild dyskinesis bilaterally.  Cervical spine exam reveals range of motion to be within normal limits.  Distal neurovascular status grossly intact left upper extremity.    IMAGING: MRI of the left shoulder dated 5/16/2025 was interpreted and reviewed.  Findings include downward sloping acromion process and minor rotator cuff tendinosis with some bursal surface fraying.  Rotator cuff otherwise grossly intact.  No other obvious bony or soft tissue abnormalities.     ASSESSMENT AND PLAN: Gallito Doshi is a 24 year old male with history of ongoing but improving periscapular left shoulder pain with likely rhomboid strain.  He has some mild scapular dyskinesis as well.      We discussed diagnosis, prognosis and treatment  options.  Currently recommend some formal physical therapy with modalities as indicated.  He was in agreement.  A referral was placed today.  I suggested that he stop down in our therapy department today to get scheduled.  He will continue use of ibuprofen up to 600 mg 3 times daily with meals as he tolerates.  He will use Flexeril as needed at night.  We agreed on him returning to work for 1:1 cares and a lifting restriction of 25 pounds.  He will present this to his boss to see if he is able to return. Patient advised to otherwise follow up in 4 weeks for recheck, returning sooner if worsening pain, other issues or concerns.  Patient appears to understand and is in agreement with this plan.  All questions were answered.    Ramu Castelan PA-C  Orthopedics

## 2025-05-19 NOTE — LETTER
May 19, 2025      Gallito Doshi  [address] [facility] [address]  [place] [address]        To Whom It May Concern:    Gallito Doshi was seen in our clinic. He may return to {WORK OR SCHOOL OR :[id number]} without restrictions.      Sincerely,        Ramu Castelan PA-C    Electronically signed

## 2025-05-19 NOTE — LETTER
May 19, 2025      Gallito Doshi  [address]  [place] [address]        To Whom It May Concern:    Gallito Doshi was seen in our clinic. He may return to work with the following restrictions:    **Ok to do 1:1 cares.  No heavy lifting or resisted use more than 25 lbs.    These restrictions are in place until next scheduled follow up in June 19th with Dr. Alexandra.  Please call with questions or concerns.      Sincerely,        Ramu Castelan PA-C    Electronically signed

## 2025-05-20 ENCOUNTER — THERAPY VISIT (OUTPATIENT)
Dept: PHYSICAL THERAPY | Facility: HOSPITAL | Age: 25
End: 2025-05-20
Attending: PHYSICIAN ASSISTANT
Payer: COMMERCIAL

## 2025-05-20 DIAGNOSIS — M25.312 DYSKINESIS OF LEFT SCAPULA: ICD-10-CM

## 2025-05-20 DIAGNOSIS — M25.512 PERISCAPULAR PAIN OF LEFT SHOULDER: ICD-10-CM

## 2025-05-20 DIAGNOSIS — S46.912D MUSCLE STRAIN OF LEFT SCAPULAR REGION, SUBSEQUENT ENCOUNTER: ICD-10-CM

## 2025-05-20 PROCEDURE — 97110 THERAPEUTIC EXERCISES: CPT | Mod: GP

## 2025-05-20 PROCEDURE — 97161 PT EVAL LOW COMPLEX 20 MIN: CPT | Mod: GP

## 2025-05-20 PROCEDURE — 999N000104 HC STATISTIC NO CHARGE

## 2025-05-20 NOTE — PROGRESS NOTES
"PHYSICAL THERAPY EVALUATION  Type of Visit: Evaluation       Fall Risk Screen:  Have you fallen 2 or more times in the past year?: No  Have you fallen and had an injury in the past year?: No  Is patient receiving Physical Therapy Services?: Yes    Subjective         Presenting condition or subjective complaint: Shoulder injury. Strained muscle/muscles according to Ramu Castelan at the [facility] Galliot presents to therapy today on referral for evaluation of left shoulder pain.  Patient reports shoulder pain began following a CPR event here at the hospital.  He denies acute injury at onset instead, reporting tingling prior to CPR and transfer that worsened subsequently.  Patient voices pain and tingling in left shoulder with radiation superior towards cervical spine.  Patient denies tingling or paresthesias of either upper extremity.  Pain made worse with yard work and horizontal AB and adduction.  Patient utilizing over-the-counter medications along with Icy Hot and other topicals for pain management.  Goals for therapy of decreasing pain and restoring tolerance for both yard work and EMS/ER duties.  Date of onset: 05/19/25 (Date of referral)    Relevant medical history:     Dates & types of surgery: Appendectomy fall 2023 [facility] fall 2019 and spring 2020    Prior diagnostic imaging/testing results: MRI; X-ray     Prior therapy history for the same diagnosis, illness or injury: No      Prior Level of Function  Transfers: Independent  Ambulation: Independent  ADL: Independent    Living Environment  Social support: Alone   Type of home: Apartment/condo   Stairs to enter the home: No       Ramp: No   Stairs inside the home: No       Help at home: None  Equipment owned:       Employment: Yes EMT and ER Tech  Hobbies/Interests: Rick and working out    Patient goals for therapy: Return to work    Pain assessment: Pain present     Objective   CERVICAL SPINE EVALUATION  PAIN: Pain Level at Rest: 2/10  Pain " Level with Use: 4/10  Pain Location: cervical spine, thoracic spine, and shoulder  Pain Quality: Aching, Dull, Numb, and Tingling  Pain Frequency: constant  Pain is Worst: daytime  Pain is Exacerbated By: Push and pull movements, job duties, at worst overhead movements of LUE.  Pain is Relieved By: NSAIDs and rest  Pain Progression: Improved  INTEGUMENTARY (edema, incisions): WNL  POSTURE: Sitting Posture: Rounded shoulders, Forward head, Thoracic kyphosis increased  GAIT:   Weightbearing Status: WBAT  Assistive Device(s): None  Gait Deviations: WNL  BALANCE/PROPRIOCEPTION: WNL  ROM:   (Degrees) Left AROM Right AROM    Cervical Flexion Normal with increase in pain and tingling    Cervical Extension Normal    Cervical Side bend Normal Mild decrease with increased tingling left-sided    Cervical Rotation Mild decrease with increased tingling left-sided Normal    Cervical Protrusion Normal with increased pain and tingling    Cervical Retraction Normal with increased tingling    Thoracic Flexion Normal    Thoracic Extension Normal    Thoracic Rotation Normal Normal     Left AROM Left PROM Right AROM Right PROM   Shoulder Flexion Normal Normal Normal Normal   Shoulder Extension 60  Normal Normal   Shoulder Abduction Normal Normal Normal Normal   Shoulder Adduction End range pain  End range pain  Normal Normal   Shoulder IR Normal Normal Normal Normal   Shoulder ER Normal Normal Normal Normal   Shoulder Horiz Abduction Normal Normal Normal Normal   Shoulder Horiz Adduction Normal Normal Normal Normal   Pain:   End Feel:     MYOTOMES:    Left Right   C1-2 (Neck Flexion) 5 5   C3 (Neck Side Bend)  4 5   C4 (Shrug) 5 5   C5 (Deltoid) 5 5   C6 (Biceps) 5 5   C7 (Triceps) 4 5   C8 (Thumb Ext) 5 5   T1 (Intrinsics) 5 5     DTR S:   CORD SIGNS:   DERMATOMES: WNL  NEURAL TENSION:    Left Right   SLR     SLR with DF     Femoral Nerve     Slump     Rivas (Lumbar)     Rivas (Thoracic)     Rivas (Cervical)     Median Positive Negative     Ulnar Positive Negative    Radial        FLEXIBILITY: Decreased upper trap L, Decreased levator L   SPECIAL TESTS:    Left Right   Alar Ligament    Cervical Flexion-Rotation     Cervical Rot/Lateral Flex     Compression     Distraction Positive Positive   Spurling s Negative  Negative    Thoracic Outlet Screen (Justin Denis)     Transverse Ligament     Vertebral Artery     Cotton Roll Test     Craniocervical Flexor Endurance Test     Mannheimer Test            PALPATION:   SPINAL SEGMENTAL CONCLUSIONS: Hypomobility throughout cervical and thoracic to CPA and UPA. Chief complaint reproduced with CPA/UPA C3-C7.       Assessment & Plan   CLINICAL IMPRESSIONS  Medical Diagnosis: Periscapular pain of left shoulder (M25.512), Muscle strain of left scapular region, subsequent encounter (S46.912D), Dyskinesis of left scapula (M25.312)    Treatment Diagnosis: Cervical/thoracic pain with mobility and functional activity deficits.   Impression/Assessment: Patient is a 24 year old male with complaints of left shoulder pain acute in nature.  Clinical testing somewhat muddy illustrating both contributions from thoracic and cervical region.  Patient to benefit from skilled physical therapy for pain management and restoration of normal functional mobility and strength ensuring safe return to work duties.  The following significant findings have been identified: Pain, Decreased ROM/flexibility, Decreased joint mobility, Decreased strength, Decreased activity tolerance, and Impaired posture. These impairments interfere with their ability to perform self care tasks, work tasks, recreational activities, and household chores as compared to previous level of function.     Clinical Decision Making (Complexity):  Clinical Presentation: Stable/Uncomplicated  Clinical Presentation Rationale: based on medical and personal factors listed in PT evaluation  Clinical Decision Making (Complexity): Low complexity    PLAN OF CARE  Treatment  Interventions:  Modalities: Cryotherapy, Dry Needling, E-stim, Hot Pack, Mechanical Traction, Ultrasound, Vasoneumatic Device  Interventions: Gait Training, Manual Therapy, Neuromuscular Re-education, Therapeutic Activity, Therapeutic Exercise, Self-Care/Home Management    Long Term Goals     PT Goal 1  Goal Identifier: LTG #1  Goal Description: Pt. to be independent with correct performance of home exercise program for cervical and thoracic mobility and strength to begin self-management of his condition.  Target Date: 07/01/25  PT Goal 2  Goal Identifier: LTG #2  Goal Description: Patient to report minimal to no pain with simulated transfer of therapist to ensure safe performance of work duties  Target Date: 07/01/25  PT Goal 3  Goal Identifier: LTG #3  Goal Description: patient to restore cervical range of motion to within normal limits without pain improving tolerance for functional daily range requirements.  Target Date: 07/01/25  PT Goal 4  Goal Identifier: STG #1  Goal Description: Patient to report pain decrease to 2 out of 10 at worst or better with daily activities improving overall level of comfort  Target Date: 06/10/25      Frequency of Treatment: 1x weekly  Duration of Treatment: 6 weeks    Recommended Referrals to Other Professionals:   Education Assessment:   Learner/Method: Patient;Listening;Reading;Demonstration;Pictures/Video;No Barriers to Learning    Risks and benefits of evaluation/treatment have been explained.   Patient/Family/caregiver agrees with Plan of Care.     Evaluation Time:     PT Eval, Low Complexity Minutes (40750): 35       Signing Clinician: Tra Matta PT

## 2025-05-27 ENCOUNTER — OFFICE VISIT (OUTPATIENT)
Dept: PSYCHOLOGY | Facility: OTHER | Age: 25
End: 2025-05-27
Attending: COUNSELOR
Payer: COMMERCIAL

## 2025-05-27 DIAGNOSIS — F34.1 PERSISTENT DEPRESSIVE DISORDER: Primary | ICD-10-CM

## 2025-05-27 PROCEDURE — 90832 PSYTX W PT 30 MINUTES: CPT | Performed by: COUNSELOR

## 2025-05-28 PROBLEM — F32.A ANXIETY AND DEPRESSION: Status: ACTIVE | Noted: 2024-03-11

## 2025-05-28 PROBLEM — F32.A DEPRESSION: Status: ACTIVE | Noted: 2025-05-28

## 2025-05-28 NOTE — PROGRESS NOTES
Progress Note    Client Name: Gallito Doshi  Date: May 27, 2025         Service Type: Individual      Session Start Time: 0100  Session End Time: 0135      Session Length: 35       Attendees: Client attended alone    PHQ-9 :       10/24/2024     7:58 AM 2/3/2025    10:36 AM 4/9/2025     3:49 PM   PHQ-9 SCORE   PHQ-9 Total Score MyChart 0     PHQ-9 Total Score 0  1 0       Patient-reported      ADRY-7 :        10/24/2024     7:58 AM 2/3/2025    10:36 AM 4/9/2025     3:49 PM   ADRY-7 SCORE   Total Score 0 (minimal anxiety)     Total Score 0  1 0       Patient-reported           DATA    Gallito Doshi presented as O x 4,coherent,relevant and moderately..His memory.was intact .The pt exhibited no overt signs of psychotic processes. There was no report of symptoms indicating derailment of thought  such as  A/V hallucinations.The pt denied S/H ideations and as a consequence  there was no immediate need  for a  safety plan.The patient's insight and judgement were within a range acceptable for safety.His   fund of information appears to be within a normal range..    Gallito reported that he had injured his shoulder and arm somehow. He can not return to work until  around June 16. The pt is highly distressed  about this situation and unsure how he will survive. Contacted [facility] about resources which might help with rent which may be due soon.. 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    The pt was given the number for the  Housing Crisis Resource.        Progress Since Last Session (Related to Symptoms / Goals / Homework):   Symptoms: Worsening (the pt is distressed about his present situation)    Homework: Completed in session     Current / Ongoing Stressors and Concerns:   Unable to work due muscle injuries     Treatment Objective(s) Addressed in This Session:   Alleviate the pt distress     Intervention:   Motivational  interviewing     Response to Interventions:   Receptive to help     ASSESSMENT: Current Emotional / Mental Status (status of significant symptoms):   Risk status (Self / Other harm or suicidal ideation)   Client denies current fears or concerns for personal safety.   Client denies current or recent suicidal ideation or behaviors.   Client denies current or recent homicidal ideation or behaviors.   Client denies current or recent self injurious behavior or ideation.   Client denies other safety concerns.   Recommended that patient call 911 or go to the local ED should there be a change in any of these risk factors     Appearance:   Appropriate    Eye Contact:   Good    Psychomotor Behavior: Normal    Attitude:   Cooperative    Orientation:   All   Speech    Rate / Production: Normal     Volume:  Normal    Mood:    Anxious  Dysphoric   Affect:    Mood congruent   Thought Content:  Clear    Thought Form:  Coherent  Logical    Insight:    Good         Medication Compliance:   Yes     Changes in Health Issues:     Yes: Pain, Associated Psychological Distress  Unable to work due to his present situation     Chemical Use Review:   Substance Use: Chemical use reviewed, no active concerns identified      Tobacco Use: No current tobacco use.       Collateral Reports Completed:   Not Applicable    PLAN: (Client Tasks / Therapist Tasks / Other)    Check with [facility] for available resources    NAN Barrios

## 2025-06-03 DIAGNOSIS — F33.1 MODERATE EPISODE OF RECURRENT MAJOR DEPRESSIVE DISORDER (H): ICD-10-CM

## 2025-06-03 DIAGNOSIS — F41.9 ANXIETY: ICD-10-CM

## 2025-06-03 RX ORDER — VENLAFAXINE HYDROCHLORIDE 150 MG/1
150 CAPSULE, EXTENDED RELEASE ORAL DAILY
Qty: 90 CAPSULE | Refills: 1 | Status: SHIPPED | OUTPATIENT
Start: 2025-06-03

## 2025-06-05 ENCOUNTER — THERAPY VISIT (OUTPATIENT)
Dept: PHYSICAL THERAPY | Facility: HOSPITAL | Age: 25
End: 2025-06-05
Attending: PHYSICIAN ASSISTANT
Payer: COMMERCIAL

## 2025-06-05 DIAGNOSIS — M25.312 DYSKINESIS OF LEFT SCAPULA: ICD-10-CM

## 2025-06-05 DIAGNOSIS — S46.912D MUSCLE STRAIN OF LEFT SCAPULAR REGION, SUBSEQUENT ENCOUNTER: ICD-10-CM

## 2025-06-05 DIAGNOSIS — M25.512 PERISCAPULAR PAIN OF LEFT SHOULDER: Primary | ICD-10-CM

## 2025-06-05 PROCEDURE — 97140 MANUAL THERAPY 1/> REGIONS: CPT | Mod: GP,CQ

## 2025-06-05 PROCEDURE — 97110 THERAPEUTIC EXERCISES: CPT | Mod: GP,CQ

## 2025-06-10 ENCOUNTER — MYC MEDICAL ADVICE (OUTPATIENT)
Dept: FAMILY MEDICINE | Facility: OTHER | Age: 25
End: 2025-06-10

## 2025-06-19 ENCOUNTER — OFFICE VISIT (OUTPATIENT)
Dept: ORTHOPEDICS | Facility: OTHER | Age: 25
End: 2025-06-19
Attending: ORTHOPAEDIC SURGERY
Payer: COMMERCIAL

## 2025-06-19 VITALS
WEIGHT: 196 LBS | HEIGHT: 73 IN | SYSTOLIC BLOOD PRESSURE: 120 MMHG | HEART RATE: 77 BPM | DIASTOLIC BLOOD PRESSURE: 85 MMHG | BODY MASS INDEX: 25.98 KG/M2 | OXYGEN SATURATION: 100 % | TEMPERATURE: 97.5 F

## 2025-06-19 DIAGNOSIS — M25.512 PAIN IN JOINT OF LEFT SHOULDER: Primary | ICD-10-CM

## 2025-06-19 RX ORDER — BETAMETHASONE SODIUM PHOSPHATE AND BETAMETHASONE ACETATE 3; 3 MG/ML; MG/ML
12 INJECTION, SUSPENSION INTRA-ARTICULAR; INTRALESIONAL; INTRAMUSCULAR; SOFT TISSUE ONCE
Status: COMPLETED | OUTPATIENT
Start: 2025-06-19 | End: 2025-06-19

## 2025-06-19 RX ORDER — NAPROXEN 500 MG/1
500 TABLET ORAL 2 TIMES DAILY WITH MEALS
Qty: 60 TABLET | Refills: 1 | Status: SHIPPED | OUTPATIENT
Start: 2025-06-19

## 2025-06-19 RX ADMIN — BETAMETHASONE SODIUM PHOSPHATE AND BETAMETHASONE ACETATE 12 MG: 3; 3 INJECTION, SUSPENSION INTRA-ARTICULAR; INTRALESIONAL; INTRAMUSCULAR; SOFT TISSUE at 08:56

## 2025-06-19 ASSESSMENT — PAIN SCALES - GENERAL: PAINLEVEL_OUTOF10: MILD PAIN (2)

## 2025-06-19 NOTE — PROGRESS NOTES
ORTHOPEDIC CLINIC CONSULT    Referred by: Primary Care Providers:  Nelly Zapata MD, MD (General)    Chief Complaint: Gallito Doshi is a 24 year old male who is being seen for   Chief Complaint   Patient presents with    Musculoskeletal Problem     Left shoulder pain recheck       History of Present Illness:   ***    Patient's past medical, surgical, social and family histories reviewed.     History reviewed. No pertinent past medical history.    Past Surgical History:   Procedure Laterality Date    LAPAROSCOPIC APPENDECTOMY N/A 7/24/2022    Procedure: APPENDECTOMY, LAPAROSCOPIC;  Surgeon: Kenny Zambrano DO;  Location: [facility] OR    SCLERAL BUCKLE Right 10/17/2019    Left eye done 03/2020       Home Medications:  Prior to Admission medications    Medication Sig Start Date End Date Taking? Authorizing Provider   cyclobenzaprine (FLEXERIL) 10 MG tablet Take 1 tablet (10 mg) by mouth 3 times daily as needed for muscle spasms. 5/1/25  Yes Nelly Zapata MD   diclofenac (VOLTAREN) 1 % topical gel Apply 4 g topically 4 times daily as needed for moderate pain. 5/1/25  Yes Nelly Zapata MD   methylphenidate (RITALIN LA) 20 MG 24 hr capsule Take 1 capsule (20 mg) by mouth every morning. 5/22/25 6/21/25 Yes Analy Serrano APRN CNP   naproxen (NAPROSYN) 500 MG tablet Take 1 tablet (500 mg) by mouth 2 times daily (with meals). 6/19/25  Yes Shivam Alexandra MD   traZODone (DESYREL) 50 MG tablet Take 1-2 tablets ( mg) by mouth at bedtime. 4/2/25  Yes Analy Serrano APRN CNP   venlafaxine (EFFEXOR XR) 150 MG 24 hr capsule Take 1 capsule (150 mg) by mouth daily. 6/3/25  Yes Analy Serrano APRN CNP   venlafaxine (EFFEXOR XR) 75 MG 24 hr capsule Take 1 capsule (75 mg) by mouth daily. Take along with 150 mg capsule for a total daily dose of 225 mg. 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    Drug use: Never    Sexual activity: Never       Family History   Problem Relation Age of Onset    Depression Mother     Hemochromatosis Mother     No Known Problems Father     No Known Problems Sister     No Known Problems Brother     No Known Problems Brother     Schizophrenia Maternal Grandmother     Hemochromatosis Maternal Grandmother     Suicide Cousin 13        dead    Hemochromatosis Other        REVIEW OF SYSTEMS            Physical Exam:    Vitals: /85 (BP Location: Left arm, Patient Position: Sitting, Cuff Size: Adult Regular)   Pulse 77   Temp 97.5  F (36.4  C) (Tympanic)   Ht 1.854 m (6' 1\")   Wt 88.9 kg (196 lb)   SpO2 100%   BMI 25.86 kg/m    BMI= Body mass index is 25.86 kg/m .    Radiographs: ***     Independent visualization of the films was made.         Impression:      ICD-10-CM    1. Pain in joint of left shoulder  M25.512 betamethasone acet & sod phos (CELESTONE) injection 12 mg     Large Joint/Bursa injection and/or drainage (Shoulder, Knee)     lidocaine 1 % 2 mL     naproxen (NAPROSYN) 500 MG tablet          Plan:    All of the above pertinent physical exam and imaging modalities findings was reviewed with Gallito.    Return to clinic in *** weeks.    Further imaging required ***    Time spent with evaluation:  *** minutes    Shivam Alexandra MD  6/19/2025  8:35 AM        " physical therapy but still very limited and having some issues losing job as he could not meet light duty restrictions.  He is now hoping to have some other aspects to continue physical therapy improve his comfort and discussed injections in the scapulothoracic area which she would like to proceed and try as well as starting Naprosyn as well as increase some of his limitations to up to about 50 pound lift.  Will have him follow-up in about 6 weeks and reexamine see how he is doing with both physical therapy injection Naprosyn.    Procedure: Left shoulder scapulothoracic injection.  The area medial border of the scapula was prepped with alcohol and ChloraPrep for sterile technique injection.  A solution of 2 cc of lidocaine and 2 cc of 6 mg mL Celestone was drawn up or sterile technique.  There is a number ethyl chloride was stretching injection in the scapulothoracic space at the more proximal one third area area of tenderness.  Tolerated procedure well.    All of the above pertinent physical exam and imaging modalities findings was reviewed with Gallito.    Return to clinic in 6 weeks.    Further imaging required     Time spent with evaluation:   minutes    Shivam Alexandra MD  6/19/2025  8:35 AM

## 2025-06-27 ENCOUNTER — MYC REFILL (OUTPATIENT)
Dept: PSYCHIATRY | Facility: OTHER | Age: 25
End: 2025-06-27

## 2025-06-27 DIAGNOSIS — F90.2 ADHD (ATTENTION DEFICIT HYPERACTIVITY DISORDER), COMBINED TYPE: ICD-10-CM

## 2025-06-27 RX ORDER — METHYLPHENIDATE HYDROCHLORIDE 20 MG/1
20 CAPSULE, EXTENDED RELEASE ORAL EVERY MORNING
Qty: 30 CAPSULE | Refills: 0 | Status: CANCELLED | OUTPATIENT
Start: 2025-06-27

## 2025-06-27 NOTE — TELEPHONE ENCOUNTER
Ritalin      Last Written Prescription Date:  5.31.25  Last Fill Quantity: #30,   # refills: 0  Last Office Visit: 1.27.25  Future Office visit:    Next 5 appointments (look out 90 days)      Jul 17, 2025 8:30 AM  (Arrive by 8:15 AM)  Return Visit with Shivam Alexandra MD  [facility] [place] ([facility] [place] [facility] [address]  [place] [address]  [phone]     Jul 21, 2025 8:30 AM  (Arrive by 8:15 AM)  Adult Preventative Visit with Nelly Zapata MD  [facility] [place] ([facility] [place] ) [address]  [place] [address]  [phone]     Jul 28, 2025 11:00 AM  (Arrive by 10:45 AM)  Return Visit with AMMY Adan CNP  [facility] [place] [facility] [place] [facility]  Arrive at: [facility] [address]  [place] [address]  [phone]     Jul 29, 2025 1:00 PM  (Arrive by 12:45 PM)  Return Visit with NAN Barrios  [facility] [place] ([facility] [place] ) [address]  [place] [address]  [phone]     Aug 26, 2025 1:00 PM  (Arrive by 12:45 PM)  Return Visit with NAN Barrios  [facility] [place] ([facility] [place] ) [address]  [place] [address]  [phone]             Routing refill request to provider for review/approval because:  Drug not on the G, P or [facility] refill protocol or controlled substance

## 2025-06-30 RX ORDER — METHYLPHENIDATE HYDROCHLORIDE 20 MG/1
20 CAPSULE, EXTENDED RELEASE ORAL EVERY MORNING
Qty: 30 CAPSULE | Refills: 0 | Status: SHIPPED | OUTPATIENT
Start: 2025-07-25 | End: 2025-08-24

## 2025-06-30 RX ORDER — METHYLPHENIDATE HYDROCHLORIDE 20 MG/1
20 CAPSULE, EXTENDED RELEASE ORAL EVERY MORNING
Qty: 30 CAPSULE | Refills: 0 | Status: SHIPPED | OUTPATIENT
Start: 2025-08-22 | End: 2025-09-21

## 2025-06-30 RX ORDER — METHYLPHENIDATE HYDROCHLORIDE 20 MG/1
20 CAPSULE, EXTENDED RELEASE ORAL EVERY MORNING
Qty: 30 CAPSULE | Refills: 0 | Status: SHIPPED | OUTPATIENT
Start: 2025-06-30 | End: 2025-07-30
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
## 2025-07-17 ENCOUNTER — OFFICE VISIT (OUTPATIENT)
Dept: ORTHOPEDICS | Facility: OTHER | Age: 25
End: 2025-07-17
Attending: ORTHOPAEDIC SURGERY
Payer: COMMERCIAL

## 2025-07-17 VITALS
SYSTOLIC BLOOD PRESSURE: 115 MMHG | TEMPERATURE: 97.4 F | OXYGEN SATURATION: 98 % | WEIGHT: 196 LBS | HEART RATE: 81 BPM | BODY MASS INDEX: 25.86 KG/M2 | DIASTOLIC BLOOD PRESSURE: 70 MMHG

## 2025-07-17 DIAGNOSIS — M25.512 PERISCAPULAR PAIN OF LEFT SHOULDER: Primary | ICD-10-CM

## 2025-07-17 RX ORDER — NAPROXEN 500 MG/1
500 TABLET ORAL 2 TIMES DAILY WITH MEALS
Qty: 60 TABLET | Refills: 1 | Status: SHIPPED | OUTPATIENT
Start: 2025-07-17

## 2025-07-17 ASSESSMENT — PAIN SCALES - GENERAL: PAINLEVEL_OUTOF10: NO PAIN (0)

## 2025-07-17 NOTE — PROGRESS NOTES
ORTHOPEDIC CLINIC CONSULT    Referred by: Primary Care Providers:  Nelly Zapata MD, MD (General)    Chief Complaint:    Chief Complaint   Patient presents with    Musculoskeletal Problem     Left shoulder pain recheck       History of Present Illness: Gallito Doshi is a 25 year old male who is being seen for Musculoskeletal Problem (Left shoulder pain recheck) and periscapular discomfort along medial border and rhomboid area.  At previous visit he had underwent the injection and in the scapulothoracic space for scapulothoracic bursitis as well he did get quite a bit of improvement and was doing quite well but is now starting to have a little bit more issues mostly with that while pushing a pulling type movement not so much with any forward flexion or abduction maneuver so seems to be resolving nicely.  He presents back for evaluation.  He is doing therapy once the exercises at home not in person at this time.      Patient's past medical, surgical, social and family histories reviewed.     History reviewed. No pertinent past medical history.    Past Surgical History:   Procedure Laterality Date    LAPAROSCOPIC APPENDECTOMY N/A 7/24/2022    Procedure: APPENDECTOMY, LAPAROSCOPIC;  Surgeon: Kenny Zambrano DO;  Location: [facility] OR    SCLERAL BUCKLE Right 10/17/2019    Left eye done 03/2020       Home Medications:  Prior to Admission medications    Medication Sig Start Date End Date Taking? Authorizing Provider   cyclobenzaprine (FLEXERIL) 10 MG tablet Take 1 tablet (10 mg) by mouth 3 times daily as needed for muscle spasms. 5/1/25  Yes Nelly Zapata MD   diclofenac (VOLTAREN) 1 % topical gel Apply 4 g topically 4 times daily as needed for moderate pain. 5/1/25  Yes Nelly Zapata MD   methylphenidate (RITALIN LA) 20 MG 24 hr capsule Take 1 capsule (20 mg) by mouth every morning. 6/30/25 7/30/25 Yes Analy Serrano APRN CNP   methylphenidate (RITALIN LA) 20 MG 24 hr capsule Take 1 capsule  (20 mg) by mouth every morning. 7/25/25 8/24/25 Yes Analy Serrano APRN CNP   methylphenidate (RITALIN LA) 20 MG 24 hr capsule Take 1 capsule (20 mg) by mouth every morning. 8/22/25 9/21/25 Yes Analy Serrano APRN CNP   naproxen (NAPROSYN) 500 MG tablet Take 1 tablet (500 mg) by mouth 2 times daily (with meals). 6/19/25  Yes Shivam Alexandra MD   traZODone (DESYREL) 50 MG tablet Take 1-2 tablets ( mg) by mouth at bedtime. 4/2/25  Yes Analy Serrano APRN CNP   venlafaxine (EFFEXOR XR) 150 MG 24 hr capsule Take 1 capsule (150 mg) by mouth daily. 6/3/25  Yes Analy Serrano APRN CNP   venlafaxine (EFFEXOR XR) 75 MG 24 hr capsule Take 1 capsule (75 mg) by mouth daily. Take along with 150 mg capsule for a total daily dose of 225 mg. 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    Drug use: Never    Sexual activity: Never       Family History   Problem Relation Age of Onset    Depression Mother     Hemochromatosis Mother     No Known Problems Father     No Known Problems Sister     No Known Problems Brother     No Known Problems Brother     Schizophrenia Maternal Grandmother     Hemochromatosis Maternal Grandmother     Suicide Cousin 13        dead    Hemochromatosis Other        REVIEW OF SYSTEMS            Physical Exam:    Vitals: /70 (BP Location: Left arm, Patient Position: Sitting, Cuff Size: Adult Regular)   Pulse 81   Temp 97.4  F (36.3  C) (Tympanic)   Wt 88.9 kg (196 lb)   SpO2 98%   BMI 25.86 kg/m    BMI= Body mass index is 25.86 kg/m .  Examination left shoulder.  Overall still having doing well has a negative Neer negative Boswell negative cross body [place]'s however he still having a little bit of tenderness along the  medial border and over to the rhomboid area between the medial border the scapula and midline with some mild tenderness no particular guarding.  Still maintaining overall full range of motion  Radiographs:      Independent visualization of the films was made.         Impression:      ICD-10-CM    1. Periscapular pain of left shoulder  M25.512 Physical Therapy  Referral     naproxen (NAPROSYN) 500 MG tablet          Plan: Left shoulder scapulothoracic bursitis and periscapular pain rhomboid tenderness.  After detailed discussion and that he had been getting quite a bit better with the therapy and previous injection but started to get a little bit more symptoms again and then going backwards stick with the push pull activities mainly.  At this time to try and keep getting them towards the area get more sustainable improvement and more sustainable workability like to get him back into physical therapy where they can also work some modalities between tissue mobilization ultrasound iontophoresis e-stim and TENS units to get him to more resolve his ongoing symptoms and back to full activities and work.  Will also refill some of his Naprosyn to help with some of the inflammatory aspects.  Will have him follow-up in about 6 weeks and see how he is progressing if he can start to ramp up his workability at that time.    All of the above pertinent physical exam and imaging modalities findings was reviewed with Gallito.    Return to clinic in 6 weeks.    Further imaging required none    Time spent with evaluation:   minutes    Shivam Alexandra MD  7/17/2025  8:05 AM

## 2025-07-17 NOTE — PROGRESS NOTES
ORTHOPEDIC CLINIC CONSULT    Referred by: Primary Care Providers:  Nelly Zapata MD, MD (General)    Chief Complaint:    Chief Complaint   Patient presents with    Musculoskeletal Problem     Left shoulder pain recheck       History of Present Illness: Gallito Doshi is a 25 year old male who is being seen for Musculoskeletal Problem (Left shoulder pain recheck) ***      Patient's past medical, surgical, social and family histories reviewed.     History reviewed. No pertinent past medical history.    Past Surgical History:   Procedure Laterality Date    LAPAROSCOPIC APPENDECTOMY N/A 7/24/2022    Procedure: APPENDECTOMY, LAPAROSCOPIC;  Surgeon: Kenny Zambrano DO;  Location: [facility]    SCLERAL BUCKLE Right 10/17/2019    Left eye done 03/2020       Home Medications:  Prior to Admission medications    Medication Sig Start Date End Date Taking? Authorizing Provider   cyclobenzaprine (FLEXERIL) 10 MG tablet Take 1 tablet (10 mg) by mouth 3 times daily as needed for muscle spasms. 5/1/25  Yes Nelly Zapata MD   diclofenac (VOLTAREN) 1 % topical gel Apply 4 g topically 4 times daily as needed for moderate pain. 5/1/25  Yes Nelly Zapata MD   methylphenidate (RITALIN LA) 20 MG 24 hr capsule Take 1 capsule (20 mg) by mouth every morning. 6/30/25 7/30/25 Yes Analy Serrano APRN CNP   methylphenidate (RITALIN LA) 20 MG 24 hr capsule Take 1 capsule (20 mg) by mouth every morning. 7/25/25 8/24/25 Yes Analy Serrano APRN CNP   methylphenidate (RITALIN LA) 20 MG 24 hr capsule Take 1 capsule (20 mg) by mouth every morning. 8/22/25 9/21/25 Yes Analy Serrano APRN CNP   naproxen (NAPROSYN) 500 MG tablet Take 1 tablet (500 mg) by mouth 2 times daily (with meals). 7/17/25  Yes Shivam Alexandra MD   naproxen (NAPROSYN) 500 MG tablet Take 1 tablet (500 mg) by mouth 2 times daily (with meals). 6/19/25  Yes Shivam Alexandra MD   traZODone (DESYREL) 50 MG tablet Take 1-2 tablets ( mg) by mouth at  bedtime. 4/2/25  Yes Analy Serrano APRN CNP   venlafaxine (EFFEXOR XR) 150 MG 24 hr capsule Take 1 capsule (150 mg) by mouth daily. 6/3/25  Yes Analy Serrano APRN CNP   venlafaxine (EFFEXOR XR) 75 MG 24 hr capsule Take 1 capsule (75 mg) by mouth daily. Take along with 150 mg capsule for a total daily dose of 225 mg. 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    Drug use: Never    Sexual activity: Never       Family History   Problem Relation Age of Onset    Depression Mother     Hemochromatosis Mother     No Known Problems Father     No Known Problems Sister     No Known Problems Brother     No Known Problems Brother     Schizophrenia Maternal Grandmother     Hemochromatosis Maternal Grandmother     Suicide Cousin 13        dead    Hemochromatosis Other        REVIEW OF SYSTEMS            Physical Exam:    Vitals: /70 (BP Location: Left arm, Patient Position: Sitting, Cuff Size: Adult Regular)   Pulse 81   Temp 97.4  F (36.3  C) (Tympanic)   Wt 88.9 kg (196 lb)   SpO2 98%   BMI 25.86 kg/m    BMI= Body mass index is 25.86 kg/m .    Radiographs: ***     Independent visualization of the films was made.         Impression:      ICD-10-CM    1. Periscapular pain of left shoulder  M25.512 Physical Therapy  Referral     naproxen (NAPROSYN) 500 MG tablet          Plan:    All of the above pertinent physical exam and imaging modalities findings was reviewed with Gallito.    Return to clinic in *** weeks.    Further imaging required ***    Time spent with evaluation:  *** minutes    Shivam Alexandra MD  7/17/2025  8:12 AM

## 2025-07-21 ENCOUNTER — OFFICE VISIT (OUTPATIENT)
Dept: FAMILY MEDICINE | Facility: OTHER | Age: 25
End: 2025-07-21
Attending: FAMILY MEDICINE
Payer: COMMERCIAL

## 2025-07-21 ENCOUNTER — LAB (OUTPATIENT)
Dept: LAB | Facility: OTHER | Age: 25
End: 2025-07-21
Attending: FAMILY MEDICINE
Payer: COMMERCIAL

## 2025-07-21 ENCOUNTER — THERAPY VISIT (OUTPATIENT)
Dept: PHYSICAL THERAPY | Facility: HOSPITAL | Age: 25
End: 2025-07-21
Attending: FAMILY MEDICINE
Payer: COMMERCIAL

## 2025-07-21 VITALS
HEART RATE: 96 BPM | RESPIRATION RATE: 16 BRPM | HEIGHT: 73 IN | TEMPERATURE: 97.7 F | SYSTOLIC BLOOD PRESSURE: 110 MMHG | DIASTOLIC BLOOD PRESSURE: 82 MMHG | WEIGHT: 205 LBS | BODY MASS INDEX: 27.17 KG/M2 | OXYGEN SATURATION: 99 %

## 2025-07-21 DIAGNOSIS — S46.912D MUSCLE STRAIN OF LEFT SCAPULAR REGION, SUBSEQUENT ENCOUNTER: ICD-10-CM

## 2025-07-21 DIAGNOSIS — Z13.220 LIPID SCREENING: ICD-10-CM

## 2025-07-21 DIAGNOSIS — R74.01 TRANSAMINITIS: ICD-10-CM

## 2025-07-21 DIAGNOSIS — F41.9 ANXIETY: ICD-10-CM

## 2025-07-21 DIAGNOSIS — M25.512 PERISCAPULAR PAIN OF LEFT SHOULDER: Primary | ICD-10-CM

## 2025-07-21 DIAGNOSIS — F33.1 MODERATE EPISODE OF RECURRENT MAJOR DEPRESSIVE DISORDER (H): ICD-10-CM

## 2025-07-21 DIAGNOSIS — M25.312 DYSKINESIS OF LEFT SCAPULA: ICD-10-CM

## 2025-07-21 DIAGNOSIS — M25.512 PERISCAPULAR PAIN OF LEFT SHOULDER: ICD-10-CM

## 2025-07-21 DIAGNOSIS — Z00.00 ROUTINE GENERAL MEDICAL EXAMINATION AT A HEALTH CARE FACILITY: Primary | ICD-10-CM

## 2025-07-21 LAB
ALBUMIN SERPL BCG-MCNC: 4.8 G/DL (ref 3.5–5.2)
ALP SERPL-CCNC: 93 U/L (ref 40–150)
ALT SERPL W P-5'-P-CCNC: 19 U/L (ref 0–70)
ANION GAP SERPL CALCULATED.3IONS-SCNC: 13 MMOL/L (ref 7–15)
AST SERPL W P-5'-P-CCNC: 19 U/L (ref 0–45)
BILIRUB SERPL-MCNC: 0.6 MG/DL
BUN SERPL-MCNC: 13.1 MG/DL (ref 6–20)
CALCIUM SERPL-MCNC: 9.7 MG/DL (ref 8.8–10.4)
CHLORIDE SERPL-SCNC: 100 MMOL/L (ref 98–107)
CHOLEST SERPL-MCNC: 158 MG/DL
CREAT SERPL-MCNC: 0.88 MG/DL (ref 0.67–1.17)
EGFRCR SERPLBLD CKD-EPI 2021: >90 ML/MIN/1.73M2
ERYTHROCYTE [DISTWIDTH] IN BLOOD BY AUTOMATED COUNT: 11.9 % (ref 10–15)
FASTING STATUS PATIENT QL REPORTED: YES
FASTING STATUS PATIENT QL REPORTED: YES
GLUCOSE SERPL-MCNC: 86 MG/DL (ref 70–99)
HCO3 SERPL-SCNC: 24 MMOL/L (ref 22–29)
HCT VFR BLD AUTO: 41.5 % (ref 40–53)
HDLC SERPL-MCNC: 56 MG/DL
HGB BLD-MCNC: 14.5 G/DL (ref 13.3–17.7)
LDLC SERPL CALC-MCNC: 72 MG/DL
MCH RBC QN AUTO: 31.8 PG (ref 26.5–33)
MCHC RBC AUTO-ENTMCNC: 34.9 G/DL (ref 31.5–36.5)
MCV RBC AUTO: 91 FL (ref 78–100)
NONHDLC SERPL-MCNC: 102 MG/DL
PLATELET # BLD AUTO: 202 10E3/UL (ref 150–450)
POTASSIUM SERPL-SCNC: 3.8 MMOL/L (ref 3.4–5.3)
PROT SERPL-MCNC: 7.6 G/DL (ref 6.4–8.3)
RBC # BLD AUTO: 4.56 10E6/UL (ref 4.4–5.9)
SODIUM SERPL-SCNC: 137 MMOL/L (ref 135–145)
TRIGL SERPL-MCNC: 150 MG/DL
WBC # BLD AUTO: 5.6 10E3/UL (ref 4–11)

## 2025-07-21 PROCEDURE — 80053 COMPREHEN METABOLIC PANEL: CPT

## 2025-07-21 PROCEDURE — 3048F LDL-C <100 MG/DL: CPT

## 2025-07-21 PROCEDURE — 85027 COMPLETE CBC AUTOMATED: CPT

## 2025-07-21 PROCEDURE — 97110 THERAPEUTIC EXERCISES: CPT | Mod: GP

## 2025-07-21 PROCEDURE — 80061 LIPID PANEL: CPT

## 2025-07-21 PROCEDURE — 36415 COLL VENOUS BLD VENIPUNCTURE: CPT

## 2025-07-21 SDOH — HEALTH STABILITY: PHYSICAL HEALTH: ON AVERAGE, HOW MANY DAYS PER WEEK DO YOU ENGAGE IN MODERATE TO STRENUOUS EXERCISE (LIKE A BRISK WALK)?: 3 DAYS

## 2025-07-21 ASSESSMENT — SOCIAL DETERMINANTS OF HEALTH (SDOH): HOW OFTEN DO YOU GET TOGETHER WITH FRIENDS OR RELATIVES?: NEVER

## 2025-07-21 ASSESSMENT — PATIENT HEALTH QUESTIONNAIRE - PHQ9
SUM OF ALL RESPONSES TO PHQ QUESTIONS 1-9: 1
10. IF YOU CHECKED OFF ANY PROBLEMS, HOW DIFFICULT HAVE THESE PROBLEMS MADE IT FOR YOU TO DO YOUR WORK, TAKE CARE OF THINGS AT HOME, OR GET ALONG WITH OTHER PEOPLE: NOT DIFFICULT AT ALL
SUM OF ALL RESPONSES TO PHQ QUESTIONS 1-9: 1

## 2025-07-21 ASSESSMENT — PAIN SCALES - GENERAL: PAINLEVEL_OUTOF10: NO PAIN (0)

## 2025-07-21 NOTE — PROGRESS NOTES
Preventive Care Visit  [facility]  Nelly Zapata MD, Family Medicine  Jul 21, 2025      Assessment & Plan     Routine general medical examination at a health care facility  Discussed and updated preventive cares  Repeat wellness visit in one year     Periscapular pain of left shoulder  Follows with Dr Alexandra with last visit 7/17/2025. Note reviewed. Next visit 8/21/2025  - Left shoulder scapulothoracic bursitis and periscapular pain rhomboid tenderness   - improving  - c/w naproxen  - c/w PT    Transaminitis  Resolved. Plt 202  - Comprehensive metabolic panel; Future  - CBC with platelets; Future    Anxiety / Moderate episode of recurrent major depressive disorder (H)  Follows with Analy. Last visit 1/27/2025. Doing well. Next visit 7/28/2025  - on ritalin, effexor with good results   - c/w trazodone    Lipid screening  LDL today of 72 w/ cholesterol of 158. This is a significant improvement  - Lipid Profile; Future  - no lipid medications      The longitudinal plan of care for the diagnosis(es)/condition(s) as documented were addressed during this visit. Due to the added complexity in care, I will continue to support Gallito in the subsequent management and with ongoing continuity of care.        Counseling  Appropriate preventive services were addressed with this patient via screening, questionnaire, or discussion as appropriate for fall prevention, nutrition, physical activity, Tobacco-use cessation, social engagement, weight loss and cognition.  Checklist reviewing preventive services available has been given to the patient.  Reviewed patient's diet, addressing concerns and/or questions.   He is at risk for lack of exercise and has been provided with information to increase physical activity for the benefit of his well-being.   Patient is at risk for social isolation and has been provided with information about the benefit of social connection.   The patient was instructed to see the  dentist every 6 months.   Reviewed preventive health counseling, as reflected in patient instructions    Follow-up  Return in about 53 weeks (around 7/27/2026) for Annual Wellness Visit.    Ana Conti is a 25 year old, presenting for the following:  Physical        7/21/2025     8:02 AM   Additional Questions   Roomed by Amanda Zhou          HPI    Answers submitted by the patient for this visit:  Patient Health Questionnaire (Submitted on 7/21/2025)  If you checked off any problems, how difficult have these problems made it for you to do your work, take care of things at home, or get along with other people?: Not difficult at all  PHQ9 TOTAL SCORE: 1  Follows with Analy with next visit 7/28/2025    # left shoulder  - (4) more weeks of PT  - on naproxen  - tingling is gone  - pushing and pulling issues  - lifting restriction of 50lbs    - currently unemployed, but looking for work      Wt Readings from Last 4 Encounters:   07/21/25 93 kg (205 lb)   07/17/25 88.9 kg (196 lb)   06/19/25 88.9 kg (196 lb)   05/15/25 88.9 kg (196 lb)         Advance Care Planning    Discussed advance care planning with patient; however, patient declined at this time.        7/21/2025   General Health   How would you rate your overall physical health? (!) FAIR   Feel stress (tense, anxious, or unable to sleep) Only a little   (!) STRESS CONCERN      7/21/2025   Nutrition   Three or more servings of calcium each day? Yes   Diet: Breakfast skipped   How many servings of fruit and vegetables per day? (!) 2-3   How many sweetened beverages each day? 0-1         7/21/2025   Exercise   Days per week of moderate/strenous exercise 3 days         7/21/2025   Social Factors   Frequency of gathering with friends or relatives Never   Worry food won't last until get money to buy more No   Food not last or not have enough money for food? Yes   Do you have housing? (Housing is defined as stable permanent housing and does not include  "staying outside in a car, in a tent, in an abandoned building, in an overnight shelter, or couch-surfing.) Yes   Are you worried about losing your housing? Yes   Lack of transportation? Yes   Unable to get utilities (heat,electricity)? No   Want help with housing or utility concern? (!) YES   (!) FOOD SECURITY CONCERN PRESENT (!) TRANSPORTATION CONCERN PRESENT(!) HOUSING CONCERN PRESENT(!) SOCIAL CONNECTIONS CONCERN      7/21/2025   Dental   Dentist two times every year? (!) NO       Today's PHQ-9 Score:       7/21/2025     7:56 AM   PHQ-9 SCORE   PHQ-9 Total Score MyChart 1 (Minimal depression)   PHQ-9 Total Score 1        Patient-reported         7/21/2025   Substance Use   Alcohol more than 3/day or more than 7/wk No   Do you use any other substances recreationally? 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 No         7/21/2025   Contraception/Family Planning   Questions about contraception or family planning No       Reviewed and updated as needed this visit by Provider   Tobacco  Allergies  Meds  Problems  Med Hx  Surg Hx  Fam Hx                  Review of Systems  Constitutional, HEENT, cardiovascular, pulmonary, gi and gu systems are negative, except as otherwise noted.     Objective    Exam  /82 (BP Location: Left arm, Patient Position: Sitting, Cuff Size: Adult Regular)   Pulse 96   Temp 97.7  F (36.5  C) (Tympanic)   Resp 16   Ht 1.854 m (6' 1\")   Wt 93 kg (205 lb)   SpO2 99%   BMI 27.05 kg/m     Estimated body mass index is 27.05 kg/m  as calculated from the following:    Height as of this encounter: 1.854 m (6' 1\").    Weight as of this encounter: 93 kg (205 lb).    Physical Exam  Constitutional:       General: He is not in acute distress.     " Appearance: He is well-developed.   HENT:      Head: Normocephalic and atraumatic.      Right Ear: Tympanic membrane normal.      Left Ear: Tympanic membrane normal.      Mouth/Throat:      Mouth: Mucous membranes are moist.      Pharynx: No oropharyngeal exudate.   Eyes:      Extraocular Movements: Extraocular movements intact.      Conjunctiva/sclera: Conjunctivae normal.   Neck:      Thyroid: No thyromegaly.   Cardiovascular:      Rate and Rhythm: Normal rate and regular rhythm.      Pulses: Normal pulses.      Heart sounds: No murmur heard.  Pulmonary:      Effort: Pulmonary effort is normal. No respiratory distress.      Breath sounds: No wheezing or rales.   Abdominal:      General: Bowel sounds are normal. There is no distension.      Palpations: Abdomen is soft.      Tenderness: There is no abdominal tenderness. There is no guarding.   Musculoskeletal:         General: Normal range of motion.      Cervical back: Normal range of motion and neck supple.      Right lower leg: No edema.      Left lower leg: No edema.   Lymphadenopathy:      Cervical: No cervical adenopathy.   Skin:     General: Skin is warm and dry.   Neurological:      Mental Status: He is alert.   Psychiatric:         Mood and Affect: Mood normal.       Results for orders placed or performed in visit on 07/21/25   Lipid Profile     Status: Abnormal   Result Value Ref Range    Cholesterol 158 <200 mg/dL    Triglycerides 150 (H) <150 mg/dL    Direct Measure HDL 56 >=40 mg/dL    LDL Cholesterol Calculated 72 <100 mg/dL    Non HDL Cholesterol 102 <130 mg/dL    Patient Fasting > 8hrs? Yes     Narrative    Cholesterol  Desirable: < 200 mg/dL  Borderline High: 200 - 239 mg/dL  High: >= 240 mg/dL    Triglycerides  Normal: < 150 mg/dL  Borderline High: 150 - 199 mg/dL  High: 200-499 mg/dL  Very High: >= 500 mg/dL    Direct Measure HDL  Female: >= 50 mg/dL   Male: >= 40 mg/dL    LDL Cholesterol  Desirable: < 100 mg/dL  Above Desirable: 100 - 129 mg/dL    Borderline High: 130 - 159 mg/dL   High:  160 - 189 mg/dL   Very High: >= 190 mg/dL    Non HDL Cholesterol  Desirable: < 130 mg/dL  Above Desirable: 130 - 159 mg/dL  Borderline High: 160 - 189 mg/dL  High: 190 - 219 mg/dL  Very High: >= 220 mg/dL   CBC with platelets     Status: Normal   Result Value Ref Range    WBC Count 5.6 4.0 - 11.0 10e3/uL    RBC Count 4.56 4.40 - 5.90 10e6/uL    Hemoglobin 14.5 13.3 - 17.7 g/dL    Hematocrit 41.5 40.0 - 53.0 %    MCV 91 78 - 100 fL    MCH 31.8 26.5 - 33.0 pg    MCHC 34.9 31.5 - 36.5 g/dL    RDW 11.9 10.0 - 15.0 %    Platelet Count 202 150 - 450 10e3/uL   Comprehensive metabolic panel     Status: None   Result Value Ref Range    Sodium 137 135 - 145 mmol/L    Potassium 3.8 3.4 - 5.3 mmol/L    Carbon Dioxide (CO2) 24 22 - 29 mmol/L    Anion Gap 13 7 - 15 mmol/L    Urea Nitrogen 13.1 6.0 - 20.0 mg/dL    Creatinine 0.88 0.67 - 1.17 mg/dL    GFR Estimate >90 >60 mL/min/1.73m2    Calcium 9.7 8.8 - 10.4 mg/dL    Chloride 100 98 - 107 mmol/L    Glucose 86 70 - 99 mg/dL    Alkaline Phosphatase 93 40 - 150 U/L    AST 19 0 - 45 U/L    ALT 19 0 - 70 U/L    Protein Total 7.6 6.4 - 8.3 g/dL    Albumin 4.8 3.5 - 5.2 g/dL    Bilirubin Total 0.6 <=1.2 mg/dL    Patient Fasting > 8hrs? Yes              Signed Electronically by: Nelly Zapata MD

## 2025-07-21 NOTE — PATIENT INSTRUCTIONS
Patient Education   Preventive Care Advice   This is general advice given by our system to help you stay healthy. However, your care team may have specific advice just for you. Please talk to your care team about your preventive care needs.  Nutrition  Eat 5 or more servings of fruits and vegetables each day.  Try wheat bread, brown rice and whole grain pasta (instead of white bread, rice, and pasta).  Get enough calcium and vitamin D. Check the label on foods and aim for 100% of the RDA (recommended daily allowance).  Lifestyle  Exercise at least 150 minutes each week  (30 minutes a day, 5 days a week).  Do muscle strengthening activities 2 days a week. These help control your weight and prevent disease.  No smoking.  Wear sunscreen to prevent skin cancer.  Have a dental exam and cleaning every 6 months.  Yearly exams  See your health care team every year to talk about:  Any changes in your health.  Any medicines your care team has prescribed.  Preventive care, family planning, and ways to prevent chronic diseases.  Shots (vaccines)   HPV shots (up to age 26), if you've never had them before.  Hepatitis B shots (up to age 59), if you've never had them before.  COVID-19 shot: Get this shot when it's due.  Flu shot: Get a flu shot every year.  Tetanus shot: Get a tetanus shot every 10 years.  Pneumococcal, hepatitis A, and RSV shots: Ask your care team if you need these based on your risk.  Shingles shot (for age 50 and up)  General health tests  Diabetes screening:  Starting at age 35, Get screened for diabetes at least every 3 years.  If you are younger than age 35, ask your care team if you should be screened for diabetes.  Cholesterol test: At age 39, start having a cholesterol test every 5 years, or more often if advised.  Bone density scan (DEXA): At age 50, ask your care team if you should have this scan for osteoporosis (brittle bones).  Hepatitis C: Get tested at least once in your life.  STIs (sexually  transmitted infections)  Before age 24: Ask your care team if you should be screened for STIs.  After age 24: Get screened for STIs if you're at risk. You are at risk for STIs (including HIV) if:  You are sexually active with more than one person.  You don't use condoms every time.  You or a partner was diagnosed with a sexually transmitted infection.  If you are at risk for HIV, ask about PrEP medicine to prevent HIV.  Get tested for HIV at least once in your life, whether you are at risk for HIV or not.  Cancer screening tests  Cervical cancer screening: If you have a cervix, begin getting regular cervical cancer screening tests starting at age 21.  Breast cancer scan (mammogram): If you've ever had breasts, begin having regular mammograms starting at age 40. This is a scan to check for breast cancer.  Colon cancer screening: It is important to start screening for colon cancer at age 45.  Have a colonoscopy test every 10 years (or more often if you're at risk) Or, ask your provider about stool tests like a FIT test every year or Cologuard test every 3 years.  To learn more about your testing options, visit:   .  For help making a decision, visit:   https://bit.ly/zm23892.  Prostate cancer screening test: If you have a prostate, ask your care team if a prostate cancer screening test (PSA) at age 55 is right for you.  Lung cancer screening: If you are a current or former smoker ages 50 to 80, ask your care team if ongoing lung cancer screenings are right for you.  For informational purposes only. Not to replace the advice of your health care provider. Copyright   2023 [employer]. All rights reserved. Clinically reviewed by the [facility] Transitions Program. [employer] [id number] - REV 01/24.

## 2025-07-28 ENCOUNTER — OFFICE VISIT (OUTPATIENT)
Dept: PSYCHIATRY | Facility: OTHER | Age: 25
End: 2025-07-28
Payer: COMMERCIAL

## 2025-07-28 VITALS
DIASTOLIC BLOOD PRESSURE: 72 MMHG | SYSTOLIC BLOOD PRESSURE: 114 MMHG | BODY MASS INDEX: 27.17 KG/M2 | HEIGHT: 73 IN | HEART RATE: 87 BPM | WEIGHT: 205 LBS | OXYGEN SATURATION: 99 % | TEMPERATURE: 97.5 F

## 2025-07-28 DIAGNOSIS — G47.00 PERSISTENT DISORDER OF INITIATING OR MAINTAINING SLEEP: ICD-10-CM

## 2025-07-28 DIAGNOSIS — F33.1 MODERATE EPISODE OF RECURRENT MAJOR DEPRESSIVE DISORDER (H): ICD-10-CM

## 2025-07-28 DIAGNOSIS — F41.9 ANXIETY: ICD-10-CM

## 2025-07-28 DIAGNOSIS — F90.2 ADHD (ATTENTION DEFICIT HYPERACTIVITY DISORDER), COMBINED TYPE: ICD-10-CM

## 2025-07-28 PROCEDURE — 1126F AMNT PAIN NOTED NONE PRSNT: CPT

## 2025-07-28 PROCEDURE — 3078F DIAST BP <80 MM HG: CPT

## 2025-07-28 PROCEDURE — 99214 OFFICE O/P EST MOD 30 MIN: CPT

## 2025-07-28 PROCEDURE — 3074F SYST BP LT 130 MM HG: CPT

## 2025-07-28 ASSESSMENT — PAIN SCALES - GENERAL: PAINLEVEL_OUTOF10: NO PAIN (0)

## 2025-07-29 ENCOUNTER — OFFICE VISIT (OUTPATIENT)
Dept: PSYCHOLOGY | Facility: OTHER | Age: 25
End: 2025-07-29
Attending: COUNSELOR
Payer: COMMERCIAL

## 2025-07-29 DIAGNOSIS — F41.9 ANXIETY AND DEPRESSION: Primary | ICD-10-CM

## 2025-07-29 DIAGNOSIS — F32.A ANXIETY AND DEPRESSION: Primary | ICD-10-CM

## 2025-07-29 PROCEDURE — 90834 PSYTX W PT 45 MINUTES: CPT | Performed by: COUNSELOR

## 2025-07-31 ENCOUNTER — THERAPY VISIT (OUTPATIENT)
Dept: PHYSICAL THERAPY | Facility: HOSPITAL | Age: 25
End: 2025-07-31
Attending: FAMILY MEDICINE
Payer: COMMERCIAL

## 2025-07-31 DIAGNOSIS — M25.512 PERISCAPULAR PAIN OF LEFT SHOULDER: Primary | ICD-10-CM

## 2025-07-31 DIAGNOSIS — S46.912D MUSCLE STRAIN OF LEFT SCAPULAR REGION, SUBSEQUENT ENCOUNTER: ICD-10-CM

## 2025-07-31 PROCEDURE — 97110 THERAPEUTIC EXERCISES: CPT | Mod: GP,CQ

## 2025-08-06 ENCOUNTER — MYC REFILL (OUTPATIENT)
Dept: PSYCHIATRY | Facility: OTHER | Age: 25
End: 2025-08-06

## 2025-08-06 DIAGNOSIS — F41.9 ANXIETY: ICD-10-CM

## 2025-08-06 DIAGNOSIS — F33.1 MODERATE EPISODE OF RECURRENT MAJOR DEPRESSIVE DISORDER (H): ICD-10-CM

## 2025-08-06 RX ORDER — VENLAFAXINE HYDROCHLORIDE 75 MG/1
75 CAPSULE, EXTENDED RELEASE ORAL DAILY
Qty: 90 CAPSULE | Refills: 1 | Status: SHIPPED | OUTPATIENT
Start: 2025-08-06

## 2025-08-06 RX ORDER — VENLAFAXINE HYDROCHLORIDE 75 MG/1
75 CAPSULE, EXTENDED RELEASE ORAL DAILY
Qty: 90 CAPSULE | Refills: 1 | OUTPATIENT
Start: 2025-08-06

## 2025-08-07 ASSESSMENT — PATIENT HEALTH QUESTIONNAIRE - PHQ9
5. POOR APPETITE OR OVEREATING: NOT AT ALL
SUM OF ALL RESPONSES TO PHQ QUESTIONS 1-9: 1

## 2025-08-07 ASSESSMENT — ANXIETY QUESTIONNAIRES
3. WORRYING TOO MUCH ABOUT DIFFERENT THINGS: SEVERAL DAYS
1. FEELING NERVOUS, ANXIOUS, OR ON EDGE: NOT AT ALL
7. FEELING AFRAID AS IF SOMETHING AWFUL MIGHT HAPPEN: NOT AT ALL
GAD7 TOTAL SCORE: 2
GAD7 TOTAL SCORE: 2
5. BEING SO RESTLESS THAT IT IS HARD TO SIT STILL: NOT AT ALL
6. BECOMING EASILY ANNOYED OR IRRITABLE: SEVERAL DAYS
2. NOT BEING ABLE TO STOP OR CONTROL WORRYING: NOT AT ALL

## 2025-08-12 ENCOUNTER — THERAPY VISIT (OUTPATIENT)
Dept: PHYSICAL THERAPY | Facility: HOSPITAL | Age: 25
End: 2025-08-12
Attending: FAMILY MEDICINE
Payer: COMMERCIAL

## 2025-08-12 DIAGNOSIS — S46.912D MUSCLE STRAIN OF LEFT SCAPULAR REGION, SUBSEQUENT ENCOUNTER: ICD-10-CM

## 2025-08-12 DIAGNOSIS — M25.312 DYSKINESIS OF LEFT SCAPULA: ICD-10-CM

## 2025-08-12 DIAGNOSIS — M25.512 PERISCAPULAR PAIN OF LEFT SHOULDER: Primary | ICD-10-CM

## 2025-08-12 PROCEDURE — 97110 THERAPEUTIC EXERCISES: CPT | Mod: GP

## 2025-08-13 ENCOUNTER — OFFICE VISIT (OUTPATIENT)
Dept: PSYCHOLOGY | Facility: OTHER | Age: 25
End: 2025-08-13
Attending: COUNSELOR
Payer: COMMERCIAL

## 2025-08-13 DIAGNOSIS — F41.9 ANXIETY AND DEPRESSION: Primary | ICD-10-CM

## 2025-08-13 DIAGNOSIS — F32.A ANXIETY AND DEPRESSION: Primary | ICD-10-CM

## 2025-08-13 PROCEDURE — 90837 PSYTX W PT 60 MINUTES: CPT | Performed by: COUNSELOR

## 2025-08-25 ENCOUNTER — OFFICE VISIT (OUTPATIENT)
Dept: ORTHOPEDICS | Facility: OTHER | Age: 25
End: 2025-08-25
Attending: ORTHOPAEDIC SURGERY
Payer: COMMERCIAL

## 2025-08-25 VITALS
WEIGHT: 205.03 LBS | BODY MASS INDEX: 27.05 KG/M2 | DIASTOLIC BLOOD PRESSURE: 69 MMHG | SYSTOLIC BLOOD PRESSURE: 112 MMHG | RESPIRATION RATE: 18 BRPM | OXYGEN SATURATION: 99 % | HEART RATE: 89 BPM

## 2025-08-25 DIAGNOSIS — S46.912D MUSCLE STRAIN OF LEFT SCAPULAR REGION, SUBSEQUENT ENCOUNTER: Primary | ICD-10-CM

## 2025-08-25 PROCEDURE — 3074F SYST BP LT 130 MM HG: CPT | Performed by: ORTHOPAEDIC SURGERY

## 2025-08-25 PROCEDURE — 3078F DIAST BP <80 MM HG: CPT | Performed by: ORTHOPAEDIC SURGERY

## 2025-08-25 PROCEDURE — 99213 OFFICE O/P EST LOW 20 MIN: CPT | Performed by: ORTHOPAEDIC SURGERY

## 2025-08-25 PROCEDURE — 1125F AMNT PAIN NOTED PAIN PRSNT: CPT | Performed by: ORTHOPAEDIC SURGERY

## 2025-08-25 RX ORDER — METHYLPREDNISOLONE 4 MG/1
TABLET ORAL
Qty: 21 TABLET | Refills: 0 | Status: SHIPPED | OUTPATIENT
Start: 2025-08-25

## 2025-08-25 RX ORDER — NAPROXEN 500 MG/1
500 TABLET ORAL 2 TIMES DAILY WITH MEALS
Qty: 60 TABLET | Refills: 1 | Status: SHIPPED | OUTPATIENT
Start: 2025-08-25

## 2025-08-25 ASSESSMENT — PAIN SCALES - GENERAL: PAINLEVEL_OUTOF10: MODERATE PAIN (5)

## 2025-08-26 ENCOUNTER — OFFICE VISIT (OUTPATIENT)
Dept: PSYCHOLOGY | Facility: OTHER | Age: 25
End: 2025-08-26
Attending: COUNSELOR
Payer: COMMERCIAL

## 2025-08-26 DIAGNOSIS — F41.9 ANXIETY AND DEPRESSION: Primary | ICD-10-CM

## 2025-08-26 DIAGNOSIS — F32.A ANXIETY AND DEPRESSION: Primary | ICD-10-CM

## 2025-08-26 PROCEDURE — 90837 PSYTX W PT 60 MINUTES: CPT | Performed by: COUNSELOR

## (undated) DEVICE — STAPLER-35MM VASCULAR ECHELON FLEX

## (undated) DEVICE — RELOAD-WHITE 35MM VASCULAR ECHELON

## (undated) DEVICE — STERI-STRIP-1/2" X 4"

## (undated) DEVICE — APPLICATOR-CHLORAPREP 26ML TINTED CHG 2%+ 70% IPA-SURGICAL

## (undated) DEVICE — BLANKET-BAIR UPPER BODY

## (undated) DEVICE — Device

## (undated) DEVICE — INZII RETRIEVAL SYSTEM-10MM

## (undated) DEVICE — SUTURE-VICRYL 0 UR-6 J603H

## (undated) DEVICE — CAUTERY PAD-POLYHESIVE II ADULT

## (undated) DEVICE — IRRIGATION-NACL 1000ML

## (undated) DEVICE — TUBE-SALEM SUMP 18FR STOMACH SUCTION

## (undated) DEVICE — TROCAR SLEEVE-KII 5X100MM

## (undated) DEVICE — PACK-BASIN SET-UP

## (undated) DEVICE — LABEL-STERILE PREPRINTED FOR OR

## (undated) DEVICE — CLEARIFY VISUALIZATION SYSTEM (SCOPE WARMER)

## (undated) DEVICE — CAUTERY-LAP L-HOOK W/SHAFT 33CM

## (undated) DEVICE — GLV-8.0 PROTEXIS PI CLASSIC LF/PF

## (undated) DEVICE — PACK-LAPAROSCOPY-CUSTOM

## (undated) DEVICE — SOL-NACL 0.9% 1000ML

## (undated) DEVICE — CORD-LAPAROSCOPIC MONOPOLAR-DISPOSABLE

## (undated) DEVICE — SCD SLEEVE-KNEE REG.

## (undated) DEVICE — CANISTER-SUCTION 2000CC

## (undated) DEVICE — IRRIGATION-H2O 1000ML

## (undated) DEVICE — TUBING-INSUFFLATION/LAPAROFLATOR W/FILTER

## (undated) DEVICE — MARKER-SKIN REG

## (undated) DEVICE — WANDS-INSULSCAN

## (undated) DEVICE — TROCAR-5X100MM FIOS BLADELESS

## (undated) RX ORDER — KETOROLAC TROMETHAMINE 30 MG/ML
INJECTION, SOLUTION INTRAMUSCULAR; INTRAVENOUS
Status: DISPENSED
Start: 2022-07-24

## (undated) RX ORDER — LIDOCAINE HYDROCHLORIDE 20 MG/ML
INJECTION, SOLUTION EPIDURAL; INFILTRATION; INTRACAUDAL; PERINEURAL
Status: DISPENSED
Start: 2022-07-24

## (undated) RX ORDER — PROPOFOL 10 MG/ML
INJECTION, EMULSION INTRAVENOUS
Status: DISPENSED
Start: 2022-07-24

## (undated) RX ORDER — FENTANYL CITRATE 50 UG/ML
INJECTION, SOLUTION INTRAMUSCULAR; INTRAVENOUS
Status: DISPENSED
Start: 2022-07-24